# Patient Record
Sex: MALE | Race: WHITE | Employment: OTHER | URBAN - METROPOLITAN AREA
[De-identification: names, ages, dates, MRNs, and addresses within clinical notes are randomized per-mention and may not be internally consistent; named-entity substitution may affect disease eponyms.]

---

## 2017-01-12 ENCOUNTER — OFFICE VISIT (OUTPATIENT)
Dept: OPHTHALMOLOGY | Facility: CLINIC | Age: 67
End: 2017-01-12

## 2017-01-12 ENCOUNTER — NURSE ONLY (OUTPATIENT)
Dept: OPHTHALMOLOGY | Facility: CLINIC | Age: 67
End: 2017-01-12

## 2017-01-12 VITALS — SYSTOLIC BLOOD PRESSURE: 134 MMHG | DIASTOLIC BLOOD PRESSURE: 68 MMHG

## 2017-01-12 DIAGNOSIS — H25.13 AGE-RELATED NUCLEAR CATARACT OF BOTH EYES: ICD-10-CM

## 2017-01-12 DIAGNOSIS — E11.9 TYPE 2 DIABETES MELLITUS WITHOUT RETINOPATHY (HCC): ICD-10-CM

## 2017-01-12 DIAGNOSIS — H40.003 GLAUCOMA SUSPECT OF BOTH EYES: Primary | ICD-10-CM

## 2017-01-12 DIAGNOSIS — IMO0001 UNCONTROLLED TYPE 2 DIABETES MELLITUS WITHOUT COMPLICATION, WITHOUT LONG-TERM CURRENT USE OF INSULIN: Primary | ICD-10-CM

## 2017-01-12 PROCEDURE — 92015 DETERMINE REFRACTIVE STATE: CPT | Performed by: OPHTHALMOLOGY

## 2017-01-12 PROCEDURE — 92250 FUNDUS PHOTOGRAPHY W/I&R: CPT | Performed by: OPHTHALMOLOGY

## 2017-01-12 PROCEDURE — 92004 COMPRE OPH EXAM NEW PT 1/>: CPT | Performed by: OPHTHALMOLOGY

## 2017-01-12 RX ORDER — LISINOPRIL 10 MG/1
TABLET ORAL
COMMUNITY
Start: 2016-12-15 | End: 2017-05-30

## 2017-01-12 NOTE — PROGRESS NOTES
Janell Lakhani is here to have medication reconciliation performed by the clinical pharmacist as part of the Diabetic Annual Assessment. Medication Reconciliation:  Patient is well-aware of his medications, timing, doses, and directions.      Adherence: P

## 2017-01-12 NOTE — ASSESSMENT & PLAN NOTE
Discussed diagnosis of cataracts in detail with patient. Cataract surgery not indicated at this time due to vision level. Will continue to monitor yearly. Patient will call sooner than 1 year recall if they notice a change in vision.   New glasses today

## 2017-01-12 NOTE — PATIENT INSTRUCTIONS
Glaucoma suspect of both eyes  Discussed with patient that he is a glaucoma suspect based on increased cupping of the optic nerves with asymmetry. Retinal photos taken today to document optic nerves. Glaucoma diagnostic testing ordered.   Will not start

## 2017-01-12 NOTE — PROGRESS NOTES
Alea Ayon is a 78year old male.     HPI:     HPI     Diabetic Eye Exam    Additional comments: Pt has been a diabetic for 15+ years  15+ years on pills/  0 years on Insulin   Pt checks his BS 1x a day   Pt's last blood sugar was 100  Last HA1C was 6.6 HCl  MG Oral Tablet 24 Hr Take one tablet by mouth two times per day Disp: 180 tablet Rfl: 3   simvastatin (ZOCOR) 20 MG Oral Tab 1 tablet by mouth at bedtime (Patient taking differently: Take 10 mg by mouth nightly.  ) Disp: 90 tablet Rfl: 3   lisin -3.50 +1.25 070 +2.75   Left -4.00 +1.00 110 +2.75       Type:  Flat top bifocal      Manifest Refraction      Sphere Cylinder Axis Dist Add Near   Right -2.25 +1.00 70 20/30- +2.75 20/30   Left -3.75 +1.00 110 20/20 +2.75 20/20         Final Rx      Spher

## 2017-01-12 NOTE — PROGRESS NOTES
Marek Rendon is a 78year old male.     HPI:       Patient History:  Past Medical History   Diagnosis Date   • History of hepatitis A virus infection 1961     per NG   • Type II or unspecified type diabetes mellitus without mention of complication, not sta Cholecalciferol (D-2000 MAXIMUM STRENGTH) 2000 UNITS Oral Tab Take  by mouth.  take 1 by Oral route  every day Disp:  Rfl:        Allergies:  No Known Allergies    ROS:       PHYSICAL EXAM:      Not recorded           ASSESSMENT/PLAN:     Diagnoses and Pl

## 2017-01-12 NOTE — ASSESSMENT & PLAN NOTE
Discussed with patient that he is a glaucoma suspect based on increased cupping of the optic nerves with asymmetry. Retinal photos taken today to document optic nerves. Glaucoma diagnostic testing ordered.   Will not start medication, but will continue t

## 2017-01-31 ENCOUNTER — OFFICE VISIT (OUTPATIENT)
Dept: OPHTHALMOLOGY | Facility: CLINIC | Age: 67
End: 2017-01-31

## 2017-01-31 DIAGNOSIS — H40.003 GLAUCOMA SUSPECT OF BOTH EYES: ICD-10-CM

## 2017-01-31 PROCEDURE — 92133 CPTRZD OPH DX IMG PST SGM ON: CPT | Performed by: OPHTHALMOLOGY

## 2017-01-31 PROCEDURE — 92083 EXTENDED VISUAL FIELD XM: CPT | Performed by: OPHTHALMOLOGY

## 2017-01-31 NOTE — PROGRESS NOTES
Liban Thacker is a 78year old male.     HPI:     HPI     Patient is here for a VF and OCT with no MD.       Last edited by Timothy Tomlin O.T. on 1/31/2017  8:11 AM.     Patient History:  Past Medical History   Diagnosis Date   • History of hepatitis A v LOW STRENGTH) 81 MG Oral Tab EC Take  by mouth. take 1 tablet (81MG)  by oral route  every day Disp:  Rfl:    Cholecalciferol (D-2000 MAXIMUM STRENGTH) 2000 UNITS Oral Tab Take  by mouth.  take 1 by Oral route  every day Disp:  Rfl:        Allergies:  No Kn

## 2017-04-28 ENCOUNTER — HOSPITAL ENCOUNTER (OUTPATIENT)
Dept: GENERAL RADIOLOGY | Facility: HOSPITAL | Age: 67
Discharge: HOME | End: 2017-04-28
Attending: ORTHOPAEDIC SURGERY | Admitting: ORTHOPAEDIC SURGERY
Payer: MEDICARE

## 2017-04-28 ENCOUNTER — OFFICE VISIT (OUTPATIENT)
Dept: ORTHOPEDICS CLINIC | Facility: CLINIC | Age: 67
End: 2017-04-28

## 2017-04-28 DIAGNOSIS — M25.561 ACUTE PAIN OF RIGHT KNEE: ICD-10-CM

## 2017-04-28 DIAGNOSIS — M70.51 PATELLAR BURSITIS, RIGHT: Primary | ICD-10-CM

## 2017-04-28 PROCEDURE — 73565 X-RAY EXAM OF KNEES: CPT

## 2017-04-28 PROCEDURE — 99203 OFFICE O/P NEW LOW 30 MIN: CPT | Performed by: ORTHOPAEDIC SURGERY

## 2017-04-28 PROCEDURE — G0463 HOSPITAL OUTPT CLINIC VISIT: HCPCS | Performed by: ORTHOPAEDIC SURGERY

## 2017-04-28 PROCEDURE — 73560 X-RAY EXAM OF KNEE 1 OR 2: CPT

## 2017-04-28 RX ORDER — CLINDAMYCIN HYDROCHLORIDE 150 MG/1
150 CAPSULE ORAL 3 TIMES DAILY
Qty: 30 CAPSULE | Refills: 0 | Status: SHIPPED | OUTPATIENT
Start: 2017-04-28 | End: 2017-05-30 | Stop reason: ALTCHOICE

## 2017-04-28 NOTE — PROGRESS NOTES
4/28/2017  Laly Vazquez  9/12/1950  78year old   male  Matthew Peña MD    HPI:   Patient presents with:  Knee Pain: Right - onset about 2 weeks ago after riding his bike like usually - he has pain in the anterior aspect of the knee, has redness on Rfl:        HISTORY:  Past Medical History   Diagnosis Date   • History of hepatitis A virus infection 1961     per NG   • Type II or unspecified type diabetes mellitus without mention of complication, not stated as uncontrolled 2004     per NG   • Unspecif palpation. Patient has intact cruciate and collateral ligament exam.  Patient has soft calf. IMAGING AND TESTING:  Plain films of the right knee reveals moderate medial compartment joint space narrowing.     ASSESSMENT AND PLAN:   Patellar bursitis, rig

## 2017-05-05 ENCOUNTER — OFFICE VISIT (OUTPATIENT)
Dept: ORTHOPEDICS CLINIC | Facility: CLINIC | Age: 67
End: 2017-05-05

## 2017-05-05 DIAGNOSIS — M70.51 PATELLAR BURSITIS, RIGHT: Primary | ICD-10-CM

## 2017-05-05 PROCEDURE — 99213 OFFICE O/P EST LOW 20 MIN: CPT | Performed by: ORTHOPAEDIC SURGERY

## 2017-05-05 PROCEDURE — G0463 HOSPITAL OUTPT CLINIC VISIT: HCPCS | Performed by: ORTHOPAEDIC SURGERY

## 2017-05-05 NOTE — PROGRESS NOTES
5/5/2017  Alea Ayon  9/12/1950  78year old   male  Lyla Pérez MD    HPI:   Patient presents with:  Knee Pain: Right f/u - states he is good, no more pain, no more swelling and no other c/o. He states tthat sometimes his knees crack.     This is

## 2017-05-30 ENCOUNTER — HOSPITAL ENCOUNTER (OUTPATIENT)
Age: 67
Discharge: HOME | End: 2017-05-30
Attending: EMERGENCY MEDICINE
Payer: MEDICARE

## 2017-05-30 VITALS
OXYGEN SATURATION: 100 % | BODY MASS INDEX: 30.48 KG/M2 | WEIGHT: 225 LBS | DIASTOLIC BLOOD PRESSURE: 75 MMHG | RESPIRATION RATE: 19 BRPM | HEIGHT: 72 IN | TEMPERATURE: 98 F | SYSTOLIC BLOOD PRESSURE: 155 MMHG | HEART RATE: 85 BPM

## 2017-05-30 DIAGNOSIS — L03.116 CELLULITIS OF LEFT FOOT: Primary | ICD-10-CM

## 2017-05-30 PROCEDURE — 82962 GLUCOSE BLOOD TEST: CPT

## 2017-05-30 PROCEDURE — 99203 OFFICE O/P NEW LOW 30 MIN: CPT

## 2017-05-30 PROCEDURE — 99213 OFFICE O/P EST LOW 20 MIN: CPT

## 2017-05-30 RX ORDER — SULFAMETHOXAZOLE AND TRIMETHOPRIM 800; 160 MG/1; MG/1
1 TABLET ORAL 2 TIMES DAILY
Qty: 14 TABLET | Refills: 0 | Status: SHIPPED | OUTPATIENT
Start: 2017-05-30 | End: 2017-06-06

## 2017-05-30 RX ORDER — CEPHALEXIN 500 MG/1
500 CAPSULE ORAL 4 TIMES DAILY
Qty: 56 CAPSULE | Refills: 0 | Status: SHIPPED | OUTPATIENT
Start: 2017-05-30 | End: 2017-06-08

## 2017-05-30 RX ORDER — CEPHALEXIN 500 MG/1
500 CAPSULE ORAL 4 TIMES DAILY
Qty: 56 CAPSULE | Refills: 0 | Status: SHIPPED | OUTPATIENT
Start: 2017-05-30 | End: 2017-05-30

## 2017-05-30 RX ORDER — CEPHALEXIN 250 MG/5ML
500 POWDER, FOR SUSPENSION ORAL 4 TIMES DAILY
Qty: 400 ML | Refills: 0 | Status: SHIPPED | OUTPATIENT
Start: 2017-05-30 | End: 2017-05-30

## 2017-05-30 NOTE — ED INITIAL ASSESSMENT (HPI)
About 1 month ago patient was diagnosed with bursitis of right knee and was prescribed clindamycin and finished course; now left foot is reddened, swollen and warm to touch for past 5-6 days; denies fevers

## 2017-05-30 NOTE — ED PROVIDER NOTES
Patient Seen in: [address]    History   Patient presents with:  Lower Extremity Injury (musculoskeletal)    Stated Complaint: left foot red and sore possible infection    The history is provided by the patient.      78 y Cholecalciferol (D-2000 MAXIMUM STRENGTH) 2000 UNITS Oral Tab,  Take  by mouth.  take 1 by Oral route  every day       Family History   Problem Relation Age of Onset   • Hernia complications[other] [OTHER] Father 48     Cause of death; per NG   • Diabetes START taking these medications    Sulfamethoxazole-TMP -160 MG Oral Tab per tablet  Take 1 tablet by mouth 2 (two) times daily. , Print, Disp-14 tablet, R-0

## 2017-06-02 ENCOUNTER — APPOINTMENT (OUTPATIENT)
Dept: LAB | Facility: HOSPITAL | Age: 67
End: 2017-06-02
Attending: INTERNAL MEDICINE
Payer: MEDICARE

## 2017-06-02 DIAGNOSIS — E78.5 HYPERLIPIDEMIA, UNSPECIFIED HYPERLIPIDEMIA TYPE: ICD-10-CM

## 2017-06-02 DIAGNOSIS — I10 ESSENTIAL HYPERTENSION WITH GOAL BLOOD PRESSURE LESS THAN 130/85: ICD-10-CM

## 2017-06-02 DIAGNOSIS — C61 PROSTATE CANCER (HCC): ICD-10-CM

## 2017-06-02 DIAGNOSIS — IMO0001 UNCONTROLLED TYPE 2 DIABETES MELLITUS WITHOUT COMPLICATION, WITHOUT LONG-TERM CURRENT USE OF INSULIN: ICD-10-CM

## 2017-06-02 PROCEDURE — 81003 URINALYSIS AUTO W/O SCOPE: CPT

## 2017-06-02 PROCEDURE — 83036 HEMOGLOBIN GLYCOSYLATED A1C: CPT

## 2017-06-02 PROCEDURE — 82570 ASSAY OF URINE CREATININE: CPT

## 2017-06-02 PROCEDURE — 82043 UR ALBUMIN QUANTITATIVE: CPT

## 2017-06-02 PROCEDURE — 36415 COLL VENOUS BLD VENIPUNCTURE: CPT

## 2017-06-02 PROCEDURE — 80053 COMPREHEN METABOLIC PANEL: CPT

## 2017-06-02 PROCEDURE — 80061 LIPID PANEL: CPT

## 2017-06-02 PROCEDURE — 84443 ASSAY THYROID STIM HORMONE: CPT

## 2017-06-08 ENCOUNTER — APPOINTMENT (OUTPATIENT)
Dept: LAB | Facility: HOSPITAL | Age: 67
End: 2017-06-08
Attending: INTERNAL MEDICINE
Payer: MEDICARE

## 2017-06-08 ENCOUNTER — OFFICE VISIT (OUTPATIENT)
Dept: INTERNAL MEDICINE CLINIC | Facility: CLINIC | Age: 67
End: 2017-06-08

## 2017-06-08 VITALS
HEART RATE: 79 BPM | WEIGHT: 224 LBS | BODY MASS INDEX: 30.34 KG/M2 | RESPIRATION RATE: 20 BRPM | SYSTOLIC BLOOD PRESSURE: 123 MMHG | DIASTOLIC BLOOD PRESSURE: 73 MMHG | OXYGEN SATURATION: 96 % | HEIGHT: 72 IN | TEMPERATURE: 98 F

## 2017-06-08 DIAGNOSIS — I10 ESSENTIAL HYPERTENSION: ICD-10-CM

## 2017-06-08 DIAGNOSIS — E78.5 HYPERLIPIDEMIA, UNSPECIFIED HYPERLIPIDEMIA TYPE: ICD-10-CM

## 2017-06-08 DIAGNOSIS — L03.119 CELLULITIS OF FOOT EXCLUDING TOE: ICD-10-CM

## 2017-06-08 DIAGNOSIS — IMO0001 UNCONTROLLED TYPE 2 DIABETES MELLITUS WITHOUT COMPLICATION, WITHOUT LONG-TERM CURRENT USE OF INSULIN: ICD-10-CM

## 2017-06-08 DIAGNOSIS — L03.119 CELLULITIS OF FOOT EXCLUDING TOE: Primary | ICD-10-CM

## 2017-06-08 DIAGNOSIS — E87.5 HYPERKALEMIA: ICD-10-CM

## 2017-06-08 PROCEDURE — 84550 ASSAY OF BLOOD/URIC ACID: CPT

## 2017-06-08 PROCEDURE — G0463 HOSPITAL OUTPT CLINIC VISIT: HCPCS | Performed by: INTERNAL MEDICINE

## 2017-06-08 PROCEDURE — 36415 COLL VENOUS BLD VENIPUNCTURE: CPT

## 2017-06-08 PROCEDURE — 99214 OFFICE O/P EST MOD 30 MIN: CPT | Performed by: INTERNAL MEDICINE

## 2017-06-08 PROCEDURE — 80048 BASIC METABOLIC PNL TOTAL CA: CPT

## 2017-06-08 RX ORDER — METFORMIN HYDROCHLORIDE 750 MG/1
750 TABLET, EXTENDED RELEASE ORAL 2 TIMES DAILY WITH MEALS
Qty: 180 TABLET | Refills: 1 | Status: SHIPPED | OUTPATIENT
Start: 2017-06-08 | End: 2017-12-06

## 2017-06-08 RX ORDER — LISINOPRIL 5 MG/1
5 TABLET ORAL DAILY
Qty: 90 TABLET | Refills: 1 | Status: CANCELLED | OUTPATIENT
Start: 2017-06-08

## 2017-06-08 RX ORDER — ATORVASTATIN CALCIUM 10 MG/1
TABLET, FILM COATED ORAL
Qty: 90 TABLET | Refills: 1 | Status: SHIPPED | OUTPATIENT
Start: 2017-06-08 | End: 2017-12-06

## 2017-06-08 NOTE — PATIENT INSTRUCTIONS
Problem List Items Addressed This Visit        Unprioritized    Cellulitis of foot excluding toe - Primary     Patient has been treated with antibiotics and the redness, pain and swelling in the lateral aspect of the left foot has resolved.   Given current diabetes mellitus, uncontrolled ([address]     The blood sugars are running higher than usual.  Hemoglobin A1c is higher. Patient has not been exercising as he usually does and not watching his diet as closely as he does eat a.   Advised to increase the metform

## 2017-06-08 NOTE — ASSESSMENT & PLAN NOTE
Blood pressure 123/73, pulse 79, temperature 97.5 °F (36.4 °C), temperature source Oral, resp. rate 20, height 6' (1.829 m), weight 224 lb (101.606 kg), SpO2 96 %.    Blood pressure looks well controlled but the potassium levels are elevated and the renal f

## 2017-06-08 NOTE — ASSESSMENT & PLAN NOTE
Lipid panel and liver function tests look great. Patient has been taking his simvastatin as 1 tablet 3 times a week and half a tablet 4 times a week.   Advised to stop the simvastatin and change to atorvastatin at 10 mg 1 tablet once daily–prescription has

## 2017-06-08 NOTE — ASSESSMENT & PLAN NOTE
Patient has been treated with antibiotics and the redness, pain and swelling in the lateral aspect of the left foot has resolved. Given current worsening kidney functions have advised to stop all further treatment with antibiotics.   Drink plenty of fluids

## 2017-06-08 NOTE — ASSESSMENT & PLAN NOTE
The blood sugars are running higher than usual.  Hemoglobin A1c is higher. Patient has not been exercising as he usually does and not watching his diet as closely as he does eat a.   Advised to increase the metformin to 500 mg 2 tablets in the morning and

## 2017-06-08 NOTE — PROGRESS NOTES
HPI:    Patient ID: Reyes Pablo is a 78year old male. HPI Comments: Entered by Dajuan Arriola MD at 6/8/2017  8:46 AM      Blood sugars are elevated–hemoglobin A1c is at 7.2.    Renal functions show unusual decline–creatinine is stable but EGFR has dr kg)  12/22/15 : 221 lb 4.8 oz (100.381 kg)  06/02/15 : 229 lb 3.2 oz (103.964 kg)  01/15/15 : 232 lb (105.235 kg)   ) An ACE inhibitor/angiotensin II receptor blocker is being taken. He does not see a podiatrist (normal foot exam in office). Eye exam is not fever, inability to bear weight, limited range of motion, stiffness or tingling. His past medical history is significant for diabetes. Review of Systems   Constitutional: Negative. Negative for fever. HENT: Negative. Eyes: Negative.     Respirat Eyes: Conjunctivae and EOM are normal. Pupils are equal, round, and reactive to light. Neck: Normal range of motion. Neck supple. No JVD present. No thyromegaly present.    Cardiovascular: Normal rate, regular rhythm, normal heart sounds and intact dist week  Avoid potassium containing foods for the next week–citrus, bananas and nuts         Relevant Orders    BASIC METABOLIC PANEL (8) (Completed)    Hyperkalemia     Elevated potassium–quite high at this time.   Most likely related to the antibiotics–lorenethe tablet 1      Si tab po qd      MetFORMIN HCl  MG Oral Tablet 24 Hr 180 tablet 1      Sig: Take 1 tablet (750 mg total) by mouth 2 (two) times daily with meals.            Imaging & Referrals:  None       ME#[id number]

## 2017-06-14 ENCOUNTER — APPOINTMENT (OUTPATIENT)
Dept: LAB | Facility: HOSPITAL | Age: 67
End: 2017-06-14
Attending: INTERNAL MEDICINE
Payer: MEDICARE

## 2017-06-14 DIAGNOSIS — E87.5 HYPERKALEMIA: ICD-10-CM

## 2017-06-14 DIAGNOSIS — I10 ESSENTIAL HYPERTENSION: ICD-10-CM

## 2017-06-14 PROCEDURE — 36415 COLL VENOUS BLD VENIPUNCTURE: CPT

## 2017-06-14 PROCEDURE — 80048 BASIC METABOLIC PNL TOTAL CA: CPT

## 2017-06-15 ENCOUNTER — OFFICE VISIT (OUTPATIENT)
Dept: INTERNAL MEDICINE CLINIC | Facility: CLINIC | Age: 67
End: 2017-06-15

## 2017-06-15 VITALS
WEIGHT: 223.38 LBS | TEMPERATURE: 98 F | DIASTOLIC BLOOD PRESSURE: 73 MMHG | BODY MASS INDEX: 30.25 KG/M2 | HEIGHT: 72 IN | HEART RATE: 81 BPM | SYSTOLIC BLOOD PRESSURE: 126 MMHG | RESPIRATION RATE: 20 BRPM | OXYGEN SATURATION: 97 %

## 2017-06-15 DIAGNOSIS — M10.9 GOUT, UNSPECIFIED CAUSE, UNSPECIFIED CHRONICITY, UNSPECIFIED SITE: Primary | ICD-10-CM

## 2017-06-15 DIAGNOSIS — E87.5 HYPERKALEMIA: ICD-10-CM

## 2017-06-15 DIAGNOSIS — I10 ESSENTIAL HYPERTENSION: ICD-10-CM

## 2017-06-15 PROCEDURE — G0463 HOSPITAL OUTPT CLINIC VISIT: HCPCS | Performed by: INTERNAL MEDICINE

## 2017-06-15 PROCEDURE — 99214 OFFICE O/P EST MOD 30 MIN: CPT | Performed by: INTERNAL MEDICINE

## 2017-06-15 RX ORDER — ALLOPURINOL 100 MG/1
100 TABLET ORAL DAILY
Qty: 90 TABLET | Refills: 1 | Status: SHIPPED | OUTPATIENT
Start: 2017-06-15 | End: 2017-12-06

## 2017-06-15 NOTE — ASSESSMENT & PLAN NOTE
Blood pressure 126/73, pulse 81, temperature 98.1 °F (36.7 °C), temperature source Oral, resp. rate 20, height 6' (1.829 m), weight 223 lb 6.4 oz (101.334 kg), SpO2 97 %.    Blood pressure looks stable and well controlled–he has been off his lisinopril due

## 2017-06-15 NOTE — ASSESSMENT & PLAN NOTE
Uric acid levels were at 7.5 and patient has had gouty arthropathy in his foot as well as his knee recently.   He has been advised to start on allopurinol at 100 mg 1 tablet once daily and will recheck uric acid levels along with a kidney function test at h

## 2017-06-15 NOTE — PATIENT INSTRUCTIONS
Problem List Items Addressed This Visit        Unprioritized    Essential hypertension     Blood pressure 126/73, pulse 81, temperature 98.1 °F (36.7 °C), temperature source Oral, resp.  rate 20, height 6' (1.829 m), weight 223 lb 6.4 oz (101.334 kg), SpO2

## 2017-06-15 NOTE — PROGRESS NOTES
HPI:    Patient ID: Lupe Hoskins is a 78year old male. HTN  This is a new problem. The current episode started more than 1 month ago.  Progression since onset: Seen recently with declining kidney functions–EGFR at 47, creatinine at 1.48, potassium at 6 daily with meals. Disp: 180 tablet Rfl: 1   Glucose Blood (VINNIE CONTOUR TEST) In Vitro Strip apply 1 by Misc. (Non-Drug; Combo Route) route 2 times every day Disp: 100 each Rfl: 3   omega-3 fatty acids 1000 MG Oral Cap Take 1,000 mg by mouth daily.  Disp: (101.334 kg), SpO2 97 %. Blood pressure looks stable and well controlled–he has been off his lisinopril due to development of CKD stage III at his last visit with the EGFR at 47.   Repeat labs on the eighth and today shows significant improvement in the k

## 2017-08-29 ENCOUNTER — APPOINTMENT (OUTPATIENT)
Dept: LAB | Facility: HOSPITAL | Age: 67
End: 2017-08-29
Attending: INTERNAL MEDICINE
Payer: MEDICARE

## 2017-08-29 DIAGNOSIS — E87.5 HYPERKALEMIA: ICD-10-CM

## 2017-08-29 DIAGNOSIS — M10.9 GOUT, UNSPECIFIED CAUSE, UNSPECIFIED CHRONICITY, UNSPECIFIED SITE: ICD-10-CM

## 2017-08-29 DIAGNOSIS — I10 ESSENTIAL HYPERTENSION: ICD-10-CM

## 2017-08-29 LAB
ALBUMIN SERPL BCP-MCNC: 3.9 G/DL (ref 3.5–4.8)
ALBUMIN/GLOB SERPL: 1.3 {RATIO} (ref 1–2)
ALP SERPL-CCNC: 50 U/L (ref 32–100)
ALT SERPL-CCNC: 25 U/L (ref 17–63)
ANION GAP SERPL CALC-SCNC: 5 MMOL/L (ref 0–18)
AST SERPL-CCNC: 26 U/L (ref 15–41)
BILIRUB SERPL-MCNC: 0.5 MG/DL (ref 0.3–1.2)
BUN SERPL-MCNC: 10 MG/DL (ref 8–20)
BUN/CREAT SERPL: 10 (ref 10–20)
CALCIUM SERPL-MCNC: 9.4 MG/DL (ref 8.5–10.5)
CHLORIDE SERPL-SCNC: 100 MMOL/L (ref 95–110)
CO2 SERPL-SCNC: 29 MMOL/L (ref 22–32)
CREAT SERPL-MCNC: 1 MG/DL (ref 0.5–1.5)
GLOBULIN PLAS-MCNC: 3.1 G/DL (ref 2.5–3.7)
GLUCOSE SERPL-MCNC: 184 MG/DL (ref 70–99)
OSMOLALITY UR CALC.SUM OF ELEC: 282 MOSM/KG (ref 275–295)
POTASSIUM SERPL-SCNC: 4.8 MMOL/L (ref 3.3–5.1)
PROT SERPL-MCNC: 7 G/DL (ref 5.9–8.4)
SODIUM SERPL-SCNC: 134 MMOL/L (ref 136–144)
URATE SERPL-MCNC: 5.1 MG/DL (ref 3.3–8.7)

## 2017-08-29 PROCEDURE — 80053 COMPREHEN METABOLIC PANEL: CPT

## 2017-08-29 PROCEDURE — 36415 COLL VENOUS BLD VENIPUNCTURE: CPT

## 2017-08-29 PROCEDURE — 84550 ASSAY OF BLOOD/URIC ACID: CPT

## 2017-09-05 ENCOUNTER — PATIENT OUTREACH (OUTPATIENT)
Dept: INTERNAL MEDICINE CLINIC | Facility: CLINIC | Age: 67
End: 2017-09-05

## 2017-09-07 ENCOUNTER — OFFICE VISIT (OUTPATIENT)
Dept: INTERNAL MEDICINE CLINIC | Facility: CLINIC | Age: 67
End: 2017-09-07

## 2017-09-07 VITALS
HEART RATE: 79 BPM | WEIGHT: 219 LBS | HEIGHT: 72 IN | SYSTOLIC BLOOD PRESSURE: 122 MMHG | RESPIRATION RATE: 18 BRPM | DIASTOLIC BLOOD PRESSURE: 70 MMHG | BODY MASS INDEX: 29.66 KG/M2

## 2017-09-07 DIAGNOSIS — E78.5 HYPERLIPIDEMIA, UNSPECIFIED HYPERLIPIDEMIA TYPE: ICD-10-CM

## 2017-09-07 DIAGNOSIS — M10.9 GOUT, UNSPECIFIED CAUSE, UNSPECIFIED CHRONICITY, UNSPECIFIED SITE: ICD-10-CM

## 2017-09-07 DIAGNOSIS — D22.9 ATYPICAL NEVI: Primary | ICD-10-CM

## 2017-09-07 DIAGNOSIS — IMO0001 UNCONTROLLED TYPE 2 DIABETES MELLITUS WITHOUT COMPLICATION, WITHOUT LONG-TERM CURRENT USE OF INSULIN: ICD-10-CM

## 2017-09-07 DIAGNOSIS — I10 ESSENTIAL HYPERTENSION: ICD-10-CM

## 2017-09-07 DIAGNOSIS — R63.4 WEIGHT LOSS: ICD-10-CM

## 2017-09-07 DIAGNOSIS — F41.9 ANXIETY: ICD-10-CM

## 2017-09-07 PROCEDURE — 99214 OFFICE O/P EST MOD 30 MIN: CPT | Performed by: INTERNAL MEDICINE

## 2017-09-07 PROCEDURE — G0463 HOSPITAL OUTPT CLINIC VISIT: HCPCS | Performed by: INTERNAL MEDICINE

## 2017-09-07 PROCEDURE — 99080 SPECIAL REPORTS OR FORMS: CPT | Performed by: INTERNAL MEDICINE

## 2017-09-07 RX ORDER — ESCITALOPRAM OXALATE 5 MG/1
5 TABLET ORAL DAILY
Qty: 30 TABLET | Refills: 5 | Status: SHIPPED | OUTPATIENT
Start: 2017-09-07 | End: 2018-03-12

## 2017-09-07 NOTE — PROGRESS NOTES
HPI:    Patient ID: David Awan is a 78year old male. Labs discussed-EGFR has normalized. Uric acid levels look normal.      Gout   This is a chronic problem. The current episode started more than 1 month ago. The problem occurs constantly.  The prob kg) ) An ACE inhibitor/angiotensin II receptor blocker is being taken. He does not see a podiatrist (normal foot exam in office). Eye exam is not current (due foir the eye exam). Hyperlipidemia   This is a chronic problem.  The current episode started more allopurinol 100 MG Oral Tab Take 1 tablet (100 mg total) by mouth daily.  Disp: 90 tablet Rfl: 1   atorvastatin 10 MG Oral Tab 1 tab po qd Disp: 90 tablet Rfl: 1   MetFORMIN HCl  MG Oral Tablet 24 Hr Take 1 tablet (750 mg total) by mouth 2 (two) amanda content normal. His mood appears anxious. His affect is not blunt, not labile and not inappropriate. His speech is rapid and/or pressured. Cognition and memory are normal. He does not exhibit a depressed mood. Nursing note and vitals reviewed. MICROALB/CREAT RATIO, RANDOM URINE    Weight loss     Wt Readings from Last 6 Encounters:  09/07/17 : 219 lb (99.3 kg)  06/15/17 : 223 lb 6.4 oz (101.3 kg)  06/08/17 : 224 lb (101.6 kg)  05/30/17 : 225 lb (102.1 kg)  10/28/16 : 223 lb (101.2 kg)  12/22/15

## 2017-09-07 NOTE — ASSESSMENT & PLAN NOTE
Blood pressure 122/70, pulse 79, resp. rate 18, height 6' (1.829 m), weight 219 lb (99.3 kg).      Has been off the ace inhibitors and renal functions have normalised,no antihypertensives needed at this time as bp looks well controlled

## 2017-09-07 NOTE — ASSESSMENT & PLAN NOTE
Diabetes seems well controlled, hemoglobin A1c last checked was at 7.1. He has not run any low sugar reactions at this time. Continue on metformin, recheck labs have been ordered.   Diabetic eye exam and foot exam have been completed in January of this ye

## 2017-09-07 NOTE — ASSESSMENT & PLAN NOTE
Lack od socialisation,agitation and irritability -per pt feels fatigued by the end of the day. Trial of lexapro 5 mgs 1 tab with breakfast and reassess needs at the next visit.

## 2017-09-07 NOTE — ASSESSMENT & PLAN NOTE
Wt Readings from Last 6 Encounters:  09/07/17 : 219 lb (99.3 kg)  06/15/17 : 223 lb 6.4 oz (101.3 kg)  06/08/17 : 224 lb (101.6 kg)  05/30/17 : 225 lb (102.1 kg)  10/28/16 : 223 lb (101.2 kg)  12/22/15 : 221 lb 4.8 oz (100.4 kg)     Patient has been on die

## 2017-09-07 NOTE — PATIENT INSTRUCTIONS
Problem List Items Addressed This Visit        Unprioritized    Anxiety     Lack od socialisation,agitation and irritability -per pt feels fatigued by the end of the day. Trial of lexapro 5 mgs 1 tab with breakfast and reassess needs at the next visit. Patient has been on diet control and has been trying to lose weight but will continue to monitor for any persistent weight loss. Advised to ensure that he eats adequate amount of proteins and drinks fluids to keep well-hydrated.   Follow-up in about 3 mo

## 2017-11-01 ENCOUNTER — NURSE TRIAGE (OUTPATIENT)
Dept: OTHER | Age: 67
End: 2017-11-01

## 2017-11-01 ENCOUNTER — HOSPITAL ENCOUNTER (OUTPATIENT)
Age: 67
Discharge: HOME | DRG: 549 | End: 2017-11-01
Attending: EMERGENCY MEDICINE
Payer: MEDICARE

## 2017-11-01 VITALS
DIASTOLIC BLOOD PRESSURE: 76 MMHG | HEART RATE: 91 BPM | SYSTOLIC BLOOD PRESSURE: 135 MMHG | OXYGEN SATURATION: 98 % | TEMPERATURE: 98 F | RESPIRATION RATE: 16 BRPM | BODY MASS INDEX: 29.8 KG/M2 | HEIGHT: 72 IN | WEIGHT: 220 LBS

## 2017-11-01 DIAGNOSIS — L03.116 CELLULITIS OF LEFT KNEE: Primary | ICD-10-CM

## 2017-11-01 PROCEDURE — 87205 SMEAR GRAM STAIN: CPT | Performed by: EMERGENCY MEDICINE

## 2017-11-01 PROCEDURE — 99214 OFFICE O/P EST MOD 30 MIN: CPT

## 2017-11-01 PROCEDURE — 87070 CULTURE OTHR SPECIMN AEROBIC: CPT | Performed by: EMERGENCY MEDICINE

## 2017-11-01 PROCEDURE — 87147 CULTURE TYPE IMMUNOLOGIC: CPT | Performed by: EMERGENCY MEDICINE

## 2017-11-01 PROCEDURE — 87186 SC STD MICRODIL/AGAR DIL: CPT | Performed by: EMERGENCY MEDICINE

## 2017-11-01 RX ORDER — CLINDAMYCIN HYDROCHLORIDE 300 MG/1
300 CAPSULE ORAL 3 TIMES DAILY
Qty: 30 CAPSULE | Refills: 0 | Status: SHIPPED | OUTPATIENT
Start: 2017-11-01 | End: 2017-11-06

## 2017-11-01 RX ORDER — CLINDAMYCIN HYDROCHLORIDE 300 MG/1
300 CAPSULE ORAL 3 TIMES DAILY
Qty: 30 CAPSULE | Refills: 0 | Status: SHIPPED | OUTPATIENT
Start: 2017-11-01 | End: 2017-11-01

## 2017-11-01 RX ORDER — GINSENG 100 MG
CAPSULE ORAL ONCE
Status: COMPLETED | OUTPATIENT
Start: 2017-11-01 | End: 2017-11-01

## 2017-11-01 NOTE — ED INITIAL ASSESSMENT (HPI)
C/o pain and swelling Lt knee with sore for 2 weeks. Wound drainage noted. Claimed that he was kneeling on a concrete floor  2 weeks ago.  Denies fever

## 2017-11-01 NOTE — TELEPHONE ENCOUNTER
Action Requested: Summary for Provider     []  Critical Lab, Recommendations Needed  [] Need Additional Advice  []   FYI    []   Need Orders  [] Need Medications Sent to Pharmacy  []  Other     SUMMARY: pt advised to go to IC as no appt available today.

## 2017-11-01 NOTE — ED PROVIDER NOTES
Patient Seen in: [facility] [address] [place] [address]    History   Patient presents with:  Cellulitis (integumentary, infectious)    Stated Complaint: Lt Knee Pain    HPI    The patient is a 79-year-old male with a past history of type 2 diabetes systems are as noted in HPI. Constitutional and vital signs reviewed. All other systems reviewed and negative except as noted above. PSFH elements reviewed from today and agreed except as otherwise stated in HPI.     Physical Exam   ED Triage Vital his primary MD in the next 3-5 days for wound check.   MDM           Disposition and Plan     Clinical Impression:  Cellulitis of left knee  (primary encounter diagnosis)    Disposition:  Discharge    Follow-up:  Osiris Szymanski MD  [address] [place] [address]

## 2017-11-02 ENCOUNTER — HOSPITAL ENCOUNTER (INPATIENT)
Facility: HOSPITAL | Age: 67
LOS: 4 days | Discharge: HOME | DRG: 549 | End: 2017-11-06
Attending: HOSPITALIST | Admitting: HOSPITALIST
Payer: MEDICARE

## 2017-11-02 ENCOUNTER — APPOINTMENT (OUTPATIENT)
Dept: GENERAL RADIOLOGY | Facility: HOSPITAL | Age: 67
DRG: 549 | End: 2017-11-02
Attending: NURSE PRACTITIONER
Payer: MEDICARE

## 2017-11-02 DIAGNOSIS — IMO0001 UNCONTROLLED TYPE 2 DIABETES MELLITUS WITHOUT COMPLICATION, WITHOUT LONG-TERM CURRENT USE OF INSULIN: ICD-10-CM

## 2017-11-02 DIAGNOSIS — L03.116 CELLULITIS OF LEFT KNEE: Primary | ICD-10-CM

## 2017-11-02 DIAGNOSIS — M70.42 PREPATELLAR BURSITIS OF LEFT KNEE: ICD-10-CM

## 2017-11-02 PROCEDURE — 73560 X-RAY EXAM OF KNEE 1 OR 2: CPT | Performed by: NURSE PRACTITIONER

## 2017-11-02 PROCEDURE — 99223 1ST HOSP IP/OBS HIGH 75: CPT | Performed by: HOSPITALIST

## 2017-11-02 RX ORDER — ENOXAPARIN SODIUM 100 MG/ML
40 INJECTION SUBCUTANEOUS DAILY
Status: DISCONTINUED | OUTPATIENT
Start: 2017-11-02 | End: 2017-11-06

## 2017-11-02 RX ORDER — SODIUM CHLORIDE 0.9 % (FLUSH) 0.9 %
3 SYRINGE (ML) INJECTION AS NEEDED
Status: DISCONTINUED | OUTPATIENT
Start: 2017-11-02 | End: 2017-11-06

## 2017-11-02 RX ORDER — SODIUM PHOSPHATE, DIBASIC AND SODIUM PHOSPHATE, MONOBASIC 7; 19 G/133ML; G/133ML
1 ENEMA RECTAL ONCE AS NEEDED
Status: DISCONTINUED | OUTPATIENT
Start: 2017-11-02 | End: 2017-11-06

## 2017-11-02 RX ORDER — MORPHINE SULFATE 4 MG/ML
4 INJECTION, SOLUTION INTRAMUSCULAR; INTRAVENOUS EVERY 2 HOUR PRN
Status: DISCONTINUED | OUTPATIENT
Start: 2017-11-02 | End: 2017-11-06

## 2017-11-02 RX ORDER — HYDROCODONE BITARTRATE AND ACETAMINOPHEN 5; 325 MG/1; MG/1
2 TABLET ORAL EVERY 4 HOURS PRN
Status: DISCONTINUED | OUTPATIENT
Start: 2017-11-02 | End: 2017-11-06

## 2017-11-02 RX ORDER — ACETAMINOPHEN 325 MG/1
650 TABLET ORAL EVERY 4 HOURS PRN
Status: DISCONTINUED | OUTPATIENT
Start: 2017-11-02 | End: 2017-11-06

## 2017-11-02 RX ORDER — ALLOPURINOL 100 MG/1
100 TABLET ORAL DAILY
Status: DISCONTINUED | OUTPATIENT
Start: 2017-11-03 | End: 2017-11-06

## 2017-11-02 RX ORDER — DOCUSATE SODIUM 100 MG/1
100 CAPSULE, LIQUID FILLED ORAL 2 TIMES DAILY
Status: DISCONTINUED | OUTPATIENT
Start: 2017-11-02 | End: 2017-11-06

## 2017-11-02 RX ORDER — ONDANSETRON 2 MG/ML
4 INJECTION INTRAMUSCULAR; INTRAVENOUS EVERY 6 HOURS PRN
Status: DISCONTINUED | OUTPATIENT
Start: 2017-11-02 | End: 2017-11-06

## 2017-11-02 RX ORDER — HYDROCODONE BITARTRATE AND ACETAMINOPHEN 5; 325 MG/1; MG/1
1 TABLET ORAL EVERY 4 HOURS PRN
Status: DISCONTINUED | OUTPATIENT
Start: 2017-11-02 | End: 2017-11-06

## 2017-11-02 RX ORDER — POLYETHYLENE GLYCOL 3350 17 G/17G
17 POWDER, FOR SOLUTION ORAL DAILY PRN
Status: DISCONTINUED | OUTPATIENT
Start: 2017-11-02 | End: 2017-11-06

## 2017-11-02 RX ORDER — BISACODYL 10 MG
10 SUPPOSITORY, RECTAL RECTAL
Status: DISCONTINUED | OUTPATIENT
Start: 2017-11-02 | End: 2017-11-06

## 2017-11-02 RX ORDER — ACETAMINOPHEN 325 MG/1
650 TABLET ORAL EVERY 6 HOURS PRN
Status: DISCONTINUED | OUTPATIENT
Start: 2017-11-02 | End: 2017-11-06

## 2017-11-02 RX ORDER — ESCITALOPRAM OXALATE 10 MG/1
5 TABLET ORAL DAILY
Status: DISCONTINUED | OUTPATIENT
Start: 2017-11-03 | End: 2017-11-06

## 2017-11-02 RX ORDER — ATORVASTATIN CALCIUM 10 MG/1
10 TABLET, FILM COATED ORAL NIGHTLY
Status: DISCONTINUED | OUTPATIENT
Start: 2017-11-02 | End: 2017-11-06

## 2017-11-02 RX ORDER — DEXTROSE MONOHYDRATE 25 G/50ML
50 INJECTION, SOLUTION INTRAVENOUS AS NEEDED
Status: DISCONTINUED | OUTPATIENT
Start: 2017-11-02 | End: 2017-11-06

## 2017-11-02 RX ORDER — MORPHINE SULFATE 2 MG/ML
1 INJECTION, SOLUTION INTRAMUSCULAR; INTRAVENOUS EVERY 2 HOUR PRN
Status: DISCONTINUED | OUTPATIENT
Start: 2017-11-02 | End: 2017-11-06

## 2017-11-02 RX ORDER — ZOLPIDEM TARTRATE 5 MG/1
5 TABLET ORAL NIGHTLY PRN
Status: DISCONTINUED | OUTPATIENT
Start: 2017-11-02 | End: 2017-11-06

## 2017-11-02 RX ORDER — MORPHINE SULFATE 2 MG/ML
2 INJECTION, SOLUTION INTRAMUSCULAR; INTRAVENOUS EVERY 2 HOUR PRN
Status: DISCONTINUED | OUTPATIENT
Start: 2017-11-02 | End: 2017-11-06

## 2017-11-02 NOTE — H&P
CHIEF COMPLAINT     HISTORY OF PRESENT ILLNESS    Keila Roland is a 75year old male with several days history of left knee redness, of note patient has history of right knee prepatellar bursitis,    Patient states most recently, during heavy rains, he w 0.0 oz/week       Comment: 1 beer/month    Drug use: No            Other Topics            Concern  Caffeine Concern        Yes    Comment:Coffee, 2 cups; per NG        Family History:  Reviewed, not pertinent to present illness    REVIEW OF SYSTEMS tolerated, no therapy required  Follow-up with previously treating surgeon or PCP on discharge

## 2017-11-02 NOTE — TELEPHONE ENCOUNTER
Dr Matthew Boone, would you like to see patient for evaluation today, he is inclined to go to ER  Chart reviewed, patient did go to [facility] 11/1/17 diagnosed with Celulitis, treated with abx, culture done and directed to f/u with PCP in 3 days  Patient reports increas

## 2017-11-02 NOTE — PLAN OF CARE
Diabetes/Glucose Control    • Glucose maintained within prescribed range Progressing        DISCHARGE PLANNING    • Discharge to home or other facility with appropriate resources Progressing        SAFETY ADULT - FALL    • Free from fall injury Progressing

## 2017-11-02 NOTE — TELEPHONE ENCOUNTER
Advised patient on Dr. Solis Call instruction to go to the ER.  Patient verbalized understanding and agreed to go, plans to go to [place].

## 2017-11-02 NOTE — ED PROVIDER NOTES
Patient Seen in: [facility] Emergency Department    History   No chief complaint on file.     Stated Complaint: Cellulitis    HPI    65-year-old male, with history of type 2 diabetes, previous cellulitis and bursitis of the right knee, hypertension Smokeless tobacco: Never Used                      Alcohol use: Yes           0.0 oz/week     Comment: 1 beer/month      Review of Systems    Positive for stated complaint: Cellulitis  Other systems are as noted in HPI.   Constit Result Value    Glucose 249 (*)     Sodium 130 (*)     All other components within normal limits   POCT GLUCOSE - Abnormal; Notable for the following:     POC Glucose  224 (*)     All other components within normal limits   CBC W/ DIFFERENTIAL - Abnormal;

## 2017-11-02 NOTE — ED INITIAL ASSESSMENT (HPI)
Pt c/o left knee pain, swelling and redness. Pt was seen at [address] yesterday and given clindamycin for infection. Pt states he has taken 3 doses and knee is getting worse. Slight fever last night.

## 2017-11-03 PROCEDURE — 99233 SBSQ HOSP IP/OBS HIGH 50: CPT | Performed by: HOSPITALIST

## 2017-11-03 RX ORDER — 0.9 % SODIUM CHLORIDE 0.9 %
VIAL (ML) INJECTION
Status: COMPLETED
Start: 2017-11-03 | End: 2017-11-03

## 2017-11-03 NOTE — CONSULTS
INFECTIOUS DISEASE CONSULT NOTE    Reyes Pablo Patient Status:  Inpatient    1950 MRN [id number]   Location [facility] 4W/SW/SE Attending Avila Matias MD   Hosp Day # 1 PCP Lunette Alpers per NG     Past Surgical History:  No date: BACK SURGERY  10/2007: COLONOSCOPY  Family History   Problem Relation Age of Onset   • Hernia complications [OTHER] Father 48     Cause of death; per NG   • Diabetes Sister      per NG   • Glaucoma Neg    • Macul Glucose-Vitamin C (DEX-4) 4-0.006 g chewable tab 4 tablet, 4 tablet, Oral, Q15 Min PRN  •  Insulin Aspart Pen (NOVOLOG) 100 UNIT/ML flexpen 1-7 Units, 1-7 Units, Subcutaneous, TID CC  •  allopurinol (ZYLOPRIM) tab 100 mg, 100 mg, Oral, Daily  •  atorvastat Surrounding cellulitis and edema extending into foot.   PIV+    Laboratory Data:    Recent Labs   Lab  11/03/17   0507   RBC  4.41*   HGB  13.9   HCT  41.8   MCV  94.8   MCH  31.5   MCHC  33.2   RDW  12.4   WBC  16.7*   PLT  259     Recent Labs   Lab  11/02 progress.     Robson Raza PA-C  [facility]  [phone]

## 2017-11-03 NOTE — PROGRESS NOTES
[facility] dosing service    Initial Pharmacokinetic Consult for Vancomycin Dosing     Austin Mathews is a 75year old male admitted on 11/2 who is being treated for left knee cellulitis and bursitis. Possi .   Pharmacy has been asked to dose Vancomycin by trough level(s) prior to 4th dose. Goal trough level 15-20 ug/mL. 3.  Pharmacy will need BUN/Scr daily while on Vancomycin to assess renal function. 4.  Pharmacy will follow and monitor renal function changes, toxicity and efficacy.     Pharmacy sha

## 2017-11-03 NOTE — PROGRESS NOTES
[facility]    Progress Note    Gay Louis Patient Status:  Inpatient    1950 MRN [id number]   Location [facility] 4W/SW/SE Attending Abebe Todd MD   Hosp Day # 1 PCP Gopal Oliveros MD       SUBJECTIVE:  Feeling bett measures 13.4 x 2.4 cm with lobulated soft tissue component in the midportion. Differential diagnosis would include soft tissue hematoma, extensive cellulitis versus prepatellar bursitis.            Meds:     Current Facility-Administered Medications:  Vanc with meals         Assessment  Patient Active Problem List:     Type II diabetes mellitus, uncontrolled ([address]     Essential hypertension     Hyperlipidemia     Vitamin D deficiency     Routine general medical examination at a health care facility     [employer]typi

## 2017-11-04 PROCEDURE — 99233 SBSQ HOSP IP/OBS HIGH 50: CPT | Performed by: HOSPITALIST

## 2017-11-04 RX ORDER — SODIUM CHLORIDE 9 MG/ML
INJECTION, SOLUTION INTRAVENOUS
Status: COMPLETED
Start: 2017-11-04 | End: 2017-11-04

## 2017-11-04 NOTE — PLAN OF CARE
Diabetes/Glucose Control    • Glucose maintained within prescribed range Progressing        DISCHARGE PLANNING    • Discharge to home or other facility with appropriate resources Progressing        Patient/Family Goals    • Patient/Family Long Term Goal Pr

## 2017-11-04 NOTE — PROGRESS NOTES
INFECTIOUS DISEASE PROGRESS NOTE    Allyn Carpenter Patient Status:  Inpatient    1950 MRN [id number]   Location [facility] 4W/SW/SE Attending Tahir Hernandez MD   [facility] Day # 2 PCP Yulia Doyle MD     Subjective:  Afebrile. Awake and alert. is a 75year old male with a history of HTN, T2DM, gout, R knee bursitis/cellulitis who presents 11/2 with complaints of worsening L knee pain, swelling and redness.  He spent a significant amount of time on his knees feeling out water after a heavy rainfal

## 2017-11-05 PROCEDURE — 99233 SBSQ HOSP IP/OBS HIGH 50: CPT | Performed by: HOSPITALIST

## 2017-11-05 RX ORDER — FUROSEMIDE 20 MG/1
10 TABLET ORAL ONCE
Status: COMPLETED | OUTPATIENT
Start: 2017-11-05 | End: 2017-11-05

## 2017-11-05 NOTE — PROGRESS NOTES
[facility]    Progress Note    Keila Roland Patient Status:  Inpatient    1950 MRN [id number]   Location [facility] 4W/SW/SE Attending Tr Ambrocio MD   Hosp Day # 3 PCP Jovita Raza MD       SUBJECTIVE:    Feeling be lobulated soft tissue component in the midportion. Differential diagnosis would include soft tissue hematoma, extensive cellulitis versus prepatellar bursitis.            Meds:     Current Facility-Administered Medications:  CeFAZolin Sodium (ANCEF) 1 g in Active Problem List:     Type II diabetes mellitus, uncontrolled ([facility] [address]     Essential hypertension     Hyperlipidemia     Vitamin D deficiency     Routine general medical examination at a health care facility     Atypical nevi     Medicare annual wellness v

## 2017-11-05 NOTE — PROGRESS NOTES
[facility]    Progress Note    Austin Mathews Patient Status:  Inpatient    1950 MRN [id number]   Location [facility] 4W/SW/SE Attending Chris Robledo MD   Hosp Day # 2 PCP Noris Pat MD       SUBJECTIVE:  Feeling bett bursitis.            Meds:     Current Facility-Administered Medications:  CeFAZolin Sodium (ANCEF) 1 g in sodium chloride 0.9% 100 mL IVPB 1 g Intravenous Q8H   Normal Saline Flush 0.9 % injection 3 mL 3 mL Intravenous PRN   Enoxaparin Sodium (LOVENOX) 40 Routine general medical examination at a health care facility     Atypical nevi     Medicare annual wellness visit, initial     Hyperkalemia     Glaucoma suspect of both eyes     Type 2 diabetes mellitus without retinopathy ([facility] [address]     Age-related nuclear ca

## 2017-11-06 VITALS
HEART RATE: 71 BPM | OXYGEN SATURATION: 98 % | SYSTOLIC BLOOD PRESSURE: 117 MMHG | HEIGHT: 72 IN | WEIGHT: 219.13 LBS | RESPIRATION RATE: 16 BRPM | BODY MASS INDEX: 29.68 KG/M2 | TEMPERATURE: 98 F | DIASTOLIC BLOOD PRESSURE: 67 MMHG

## 2017-11-06 PROCEDURE — 99239 HOSP IP/OBS DSCHRG MGMT >30: CPT | Performed by: HOSPITALIST

## 2017-11-06 RX ORDER — CEFADROXIL 1000 MG/1
1 TABLET ORAL 2 TIMES DAILY
Qty: 28 TABLET | Refills: 0 | Status: SHIPPED | OUTPATIENT
Start: 2017-11-06 | End: 2017-11-20

## 2017-11-06 NOTE — DISCHARGE SUMMARY
[facility]    Discharge Summary    Christina Chisholm Patient Status:  Inpatient    1950 MRN [id number]   Location [facility] 4W/SW/SE Attending Nithya Navarrete MD   Hosp Day # 4 PCP Dayton Gutierrez MD     Date of Admission:  with improving erythema and edema      History of Present Illness: Per Dr Dennise Lerner    Patient is a 68-year-old  male who was on his knees, fixing something in the basement around 3 weeks ago.   He developed some blisters on his left knee, and since CONTINUE taking these medications      Instructions Prescription details   allopurinol 100 MG Tabs  Commonly known as:  ZYLOPRIM      Take 1 tablet (100 mg total) by mouth daily.    Quantity:  90 tablet  Refills:  1     ASPIRIN ADULT LOW STRENGTH 81 MG Tb

## 2017-11-06 NOTE — PLAN OF CARE
Diabetes/Glucose Control    • Glucose maintained within prescribed range Completed        DISCHARGE PLANNING    • Discharge to home or other facility with appropriate resources Completed        Patient/Family Goals    • Patient/Family Long Term Goal Comple

## 2017-11-06 NOTE — PROGRESS NOTES
INFECTIOUS DISEASE PROGRESS NOTE    Delon Call Patient Status:  Inpatient    1950 MRN [id number]   Location [facility] 4W/SW/SE Attending Dawn Mejia MD   [address] Day # 4 PCP Romulo Farrell MD     Subjective:  Afebrile.  Comfortably resti significant amount of time on his knees feeling out water after a heavy rainfall. After which he developed swelling and erythema for which he applied a heating pad along with bacitracin ointment and a small ulcer developed.  Seen at [facility]

## 2017-11-07 ENCOUNTER — TELEPHONE (OUTPATIENT)
Dept: INTERNAL MEDICINE UNIT | Facility: HOSPITAL | Age: 67
End: 2017-11-07

## 2017-11-07 ENCOUNTER — TELEPHONE (OUTPATIENT)
Dept: MEDSURG UNIT | Facility: HOSPITAL | Age: 67
End: 2017-11-07

## 2017-11-07 ENCOUNTER — PATIENT OUTREACH (OUTPATIENT)
Dept: INTERNAL MEDICINE CLINIC | Facility: CLINIC | Age: 67
End: 2017-11-07

## 2017-11-07 NOTE — PROGRESS NOTES
Initial Post Discharge Follow Up   Discharge Date: 11/6/17  Contact Date: 11/7/2017    Consent Verification:  Assessment Completed With: Patient  HIPAA Verified? Yes    1.  Tell me why you were in the hospital?  \"L leg from the foot to the leg, it was swo issues keep me from taking my medicine    9. Does the doctor who prescribed the medicine know about the side effects you are having? No     10. Have you received your home health care services / DME ordered upon discharge?  No HH / No DME \"I dont need it\" MD if pain or swelling worsen or he develops fevers. Also educated the patient on the importance of completing full course of antibiotics. Pt reminded of TCM f/u appt with PCP on 11/16/17.      Referral/Contact:  RUSSELL           NA     [x]  Discharge Summary,

## 2017-11-16 ENCOUNTER — OFFICE VISIT (OUTPATIENT)
Dept: INTERNAL MEDICINE CLINIC | Facility: CLINIC | Age: 67
End: 2017-11-16

## 2017-11-16 ENCOUNTER — LAB ENCOUNTER (OUTPATIENT)
Dept: LAB | Facility: HOSPITAL | Age: 67
End: 2017-11-16
Attending: INTERNAL MEDICINE
Payer: MEDICARE

## 2017-11-16 VITALS
SYSTOLIC BLOOD PRESSURE: 124 MMHG | HEIGHT: 72 IN | WEIGHT: 218.63 LBS | TEMPERATURE: 98 F | HEART RATE: 74 BPM | DIASTOLIC BLOOD PRESSURE: 70 MMHG | BODY MASS INDEX: 29.61 KG/M2 | RESPIRATION RATE: 18 BRPM

## 2017-11-16 DIAGNOSIS — M70.42 PREPATELLAR BURSITIS OF LEFT KNEE: Primary | ICD-10-CM

## 2017-11-16 DIAGNOSIS — M70.42 PREPATELLAR BURSITIS OF LEFT KNEE: ICD-10-CM

## 2017-11-16 DIAGNOSIS — A41.9 SEPSIS AFFECTING SKIN: ICD-10-CM

## 2017-11-16 PROBLEM — M70.40 PREPATELLAR BURSITIS: Status: ACTIVE | Noted: 2017-11-02

## 2017-11-16 PROCEDURE — 99495 TRANSJ CARE MGMT MOD F2F 14D: CPT | Performed by: INTERNAL MEDICINE

## 2017-11-16 PROCEDURE — 36415 COLL VENOUS BLD VENIPUNCTURE: CPT

## 2017-11-16 PROCEDURE — 85025 COMPLETE CBC W/AUTO DIFF WBC: CPT

## 2017-11-16 NOTE — PATIENT INSTRUCTIONS
Problem List Items Addressed This Visit        Unprioritized    Prepatellar bursitis - Primary     Knee prepatellar bursitis, septic bursitis, treated with vancomycin and then Ancef.   He is currently on cefadroxil for 2 weeks which will be completed on the

## 2017-11-16 NOTE — ASSESSMENT & PLAN NOTE
Knee prepatellar bursitis, septic bursitis, treated with vancomycin and then Ancef. He is currently on cefadroxil for 2 weeks which will be completed on the 20th, he does have an appointment with infectious disease–Dr. Javi Atkins after.   Patient is afebrile

## 2017-11-16 NOTE — PROGRESS NOTES
HPI:    Nohemy Aguilar is a 75year old male here today for hospital follow up.    He was discharged from Inpatient hospital, [facility]  to Home   Admission Date: 11/2/17   Discharge Date: 11/6/17  Hospital Discharge Diagnosis: No Value exists for Present Illness: Per Dr Dc Gibson  Patient is a 50-year-old  male who was on his knees, fixing something in the basement around 3 weeks ago. [facility] developed some blisters on his left knee, and since then, his knee is becoming with more erythema, s tablet (100 mg total) by mouth daily. atorvastatin 10 MG Oral Tab 1 tab po qd   MetFORMIN HCl  MG Oral Tablet 24 Hr Take 1 tablet (750 mg total) by mouth 2 (two) times daily with meals.    omega-3 fatty acids 1000 MG Oral Cap Take 1,000 mg by mouth No LMP for male patient. Estimated body mass index is 29.65 kg/m² as calculated from the following:    Height as of this encounter: 6' (1.829 m). Weight as of this encounter: 218 lb 9.6 oz (99.2 kg).    /70 (BP Location: Right arm, Patient [place] WITH DIFFERENTIAL WITH PLATELET; Future      Problem List Items Addressed This Visit        Unprioritized    Prepatellar bursitis - Primary     Knee prepatellar bursitis, septic bursitis, treated with vancomycin and then Ancef.   He is currently on cefadrox

## 2017-11-20 ENCOUNTER — OFFICE VISIT (OUTPATIENT)
Dept: ORTHOPEDICS CLINIC | Facility: CLINIC | Age: 67
End: 2017-11-20

## 2017-11-20 DIAGNOSIS — M70.52 BURSITIS OF LEFT KNEE, UNSPECIFIED BURSA: Primary | ICD-10-CM

## 2017-11-20 PROCEDURE — 99213 OFFICE O/P EST LOW 20 MIN: CPT | Performed by: ORTHOPAEDIC SURGERY

## 2017-11-20 PROCEDURE — G0463 HOSPITAL OUTPT CLINIC VISIT: HCPCS | Performed by: ORTHOPAEDIC SURGERY

## 2017-11-20 NOTE — H&P
Patient presents for follow-up on his left knee infected prepatellar bursitis. This required hospitalization earlier in the month. He completes his oral antibiotic course today. He denies any fevers or chills.   Pain is minimal.    On examination, he has

## 2017-12-01 ENCOUNTER — LAB ENCOUNTER (OUTPATIENT)
Dept: LAB | Facility: HOSPITAL | Age: 67
End: 2017-12-01
Attending: INTERNAL MEDICINE
Payer: MEDICARE

## 2017-12-01 DIAGNOSIS — IMO0001 UNCONTROLLED TYPE 2 DIABETES MELLITUS WITHOUT COMPLICATION, WITHOUT LONG-TERM CURRENT USE OF INSULIN: ICD-10-CM

## 2017-12-01 PROCEDURE — 36415 COLL VENOUS BLD VENIPUNCTURE: CPT

## 2017-12-01 PROCEDURE — 80061 LIPID PANEL: CPT

## 2017-12-01 PROCEDURE — 82043 UR ALBUMIN QUANTITATIVE: CPT

## 2017-12-01 PROCEDURE — 80053 COMPREHEN METABOLIC PANEL: CPT

## 2017-12-01 PROCEDURE — 84443 ASSAY THYROID STIM HORMONE: CPT

## 2017-12-01 PROCEDURE — 81001 URINALYSIS AUTO W/SCOPE: CPT

## 2017-12-01 PROCEDURE — 82570 ASSAY OF URINE CREATININE: CPT

## 2017-12-01 PROCEDURE — 85025 COMPLETE CBC W/AUTO DIFF WBC: CPT

## 2017-12-06 ENCOUNTER — OFFICE VISIT (OUTPATIENT)
Dept: INTERNAL MEDICINE CLINIC | Facility: CLINIC | Age: 67
End: 2017-12-06

## 2017-12-06 VITALS
RESPIRATION RATE: 20 BRPM | HEART RATE: 73 BPM | SYSTOLIC BLOOD PRESSURE: 121 MMHG | HEIGHT: 72 IN | DIASTOLIC BLOOD PRESSURE: 71 MMHG | WEIGHT: 218 LBS | BODY MASS INDEX: 29.53 KG/M2

## 2017-12-06 DIAGNOSIS — E78.5 HYPERLIPIDEMIA, UNSPECIFIED HYPERLIPIDEMIA TYPE: Primary | ICD-10-CM

## 2017-12-06 DIAGNOSIS — R09.89 BRUIT OF RIGHT CAROTID ARTERY: ICD-10-CM

## 2017-12-06 DIAGNOSIS — I10 ESSENTIAL HYPERTENSION: ICD-10-CM

## 2017-12-06 DIAGNOSIS — IMO0001 UNCONTROLLED TYPE 2 DIABETES MELLITUS WITHOUT COMPLICATION, WITHOUT LONG-TERM CURRENT USE OF INSULIN: ICD-10-CM

## 2017-12-06 DIAGNOSIS — M70.42 PREPATELLAR BURSITIS OF LEFT KNEE: ICD-10-CM

## 2017-12-06 PROCEDURE — 99214 OFFICE O/P EST MOD 30 MIN: CPT | Performed by: INTERNAL MEDICINE

## 2017-12-06 PROCEDURE — G0463 HOSPITAL OUTPT CLINIC VISIT: HCPCS | Performed by: INTERNAL MEDICINE

## 2017-12-06 RX ORDER — ALLOPURINOL 100 MG/1
100 TABLET ORAL DAILY
Qty: 90 TABLET | Refills: 1 | Status: SHIPPED | OUTPATIENT
Start: 2017-12-06 | End: 2018-06-11

## 2017-12-06 RX ORDER — METFORMIN HYDROCHLORIDE 750 MG/1
750 TABLET, EXTENDED RELEASE ORAL 2 TIMES DAILY WITH MEALS
Qty: 180 TABLET | Refills: 1 | Status: SHIPPED | OUTPATIENT
Start: 2017-12-06 | End: 2018-06-11

## 2017-12-06 RX ORDER — ATORVASTATIN CALCIUM 10 MG/1
TABLET, FILM COATED ORAL
Qty: 90 TABLET | Refills: 1 | Status: SHIPPED | OUTPATIENT
Start: 2017-12-06 | End: 2018-07-19

## 2017-12-06 NOTE — ASSESSMENT & PLAN NOTE
Lipid panel and liver function tests look stable on atorvastatin at 10 mg 1 tablet once daily. Continue the same dose of medications.

## 2017-12-06 NOTE — PATIENT INSTRUCTIONS
Problem List Items Addressed This Visit        Unprioritized    Bruit of right carotid artery     Carotid Dopplers have been ordered.          Relevant Orders    US CAROTID DOPPLER BILAT - DIAG IMG ([id number])    Essential hypertension     Blood pressure 12 Dr. Navarro Clock           Relevant Medications    MetFORMIN HCl  MG Oral Tablet 24 Hr    Other Relevant Orders    CARD TREADMILL STRESS, ADULT (CPT=93017)    CBC WITH DIFFERENTIAL WITH PLATELET    COMP METABOLIC PANEL (14)    HEMOGLOBIN A1C

## 2017-12-06 NOTE — TELEPHONE ENCOUNTER
Pharmacist Neisha calling in stating original Rx for test strips (from 11/22) was sent w/o ICD-10. Requesting for Rx to be resent w/dx code. Rx sent.

## 2017-12-06 NOTE — ASSESSMENT & PLAN NOTE
Gradually improving prepatellar bursitis and septic bursitis. He has completed his cefadroxil after inpatient treatment with vancomycin and Ancef. He has remained afebrile. The prepatellar area has some erythema but no induration  any further.   His whit

## 2017-12-06 NOTE — ASSESSMENT & PLAN NOTE
Blood pressure 121/71, pulse 73, resp. rate 20, height 6' (1.829 m), weight 218 lb (98.9 kg). Blood pressures are very well controlled off medications. We continue to monitor at this time. Renal functions and EGFR looks stable.   Recheck labs in about 3-

## 2017-12-06 NOTE — ASSESSMENT & PLAN NOTE
Diabetes seems well controlled–hemoglobin A1c at 6.5. However fasting sugars were a little elevated at 140. Patient has not been watching his diet lately since Thanksgiving as he has been eating some cookies. Advised caution with desserts and snacks.   C

## 2017-12-11 ENCOUNTER — HOSPITAL ENCOUNTER (OUTPATIENT)
Dept: ULTRASOUND IMAGING | Facility: HOSPITAL | Age: 67
Discharge: HOME | End: 2017-12-11
Attending: INTERNAL MEDICINE
Payer: MEDICARE

## 2017-12-11 DIAGNOSIS — R09.89 BRUIT OF RIGHT CAROTID ARTERY: ICD-10-CM

## 2017-12-11 PROCEDURE — 93880 EXTRACRANIAL BILAT STUDY: CPT | Performed by: INTERNAL MEDICINE

## 2017-12-21 ENCOUNTER — HOSPITAL ENCOUNTER (OUTPATIENT)
Dept: CV DIAGNOSTICS | Facility: HOSPITAL | Age: 67
Discharge: HOME | End: 2017-12-21
Attending: INTERNAL MEDICINE
Payer: MEDICARE

## 2017-12-21 DIAGNOSIS — IMO0001 UNCONTROLLED TYPE 2 DIABETES MELLITUS WITHOUT COMPLICATION, WITHOUT LONG-TERM CURRENT USE OF INSULIN: ICD-10-CM

## 2017-12-21 DIAGNOSIS — I10 ESSENTIAL HYPERTENSION: ICD-10-CM

## 2017-12-21 DIAGNOSIS — E78.5 HYPERLIPIDEMIA, UNSPECIFIED HYPERLIPIDEMIA TYPE: ICD-10-CM

## 2017-12-21 PROCEDURE — 93018 CV STRESS TEST I&R ONLY: CPT | Performed by: INTERNAL MEDICINE

## 2017-12-21 PROCEDURE — 93016 CV STRESS TEST SUPVJ ONLY: CPT | Performed by: INTERNAL MEDICINE

## 2017-12-21 PROCEDURE — 93017 CV STRESS TEST TRACING ONLY: CPT | Performed by: INTERNAL MEDICINE

## 2018-03-13 RX ORDER — ESCITALOPRAM OXALATE 5 MG/1
TABLET ORAL
Qty: 30 TABLET | Refills: 5 | Status: SHIPPED | OUTPATIENT
Start: 2018-03-13 | End: 2018-08-29

## 2018-03-15 ENCOUNTER — LAB ENCOUNTER (OUTPATIENT)
Dept: LAB | Facility: HOSPITAL | Age: 68
End: 2018-03-15
Attending: INTERNAL MEDICINE
Payer: MEDICARE

## 2018-03-15 DIAGNOSIS — IMO0001 UNCONTROLLED TYPE 2 DIABETES MELLITUS WITHOUT COMPLICATION, WITHOUT LONG-TERM CURRENT USE OF INSULIN: ICD-10-CM

## 2018-03-15 LAB
ALBUMIN SERPL BCP-MCNC: 3.8 G/DL (ref 3.5–4.8)
ALBUMIN/GLOB SERPL: 1.2 {RATIO} (ref 1–2)
ALP SERPL-CCNC: 53 U/L (ref 32–100)
ALT SERPL-CCNC: 26 U/L (ref 17–63)
ANION GAP SERPL CALC-SCNC: 8 MMOL/L (ref 0–18)
AST SERPL-CCNC: 27 U/L (ref 15–41)
BACTERIA UR QL AUTO: NEGATIVE /HPF
BASOPHILS # BLD: 0 K/UL (ref 0–0.2)
BASOPHILS NFR BLD: 0 %
BILIRUB SERPL-MCNC: 0.7 MG/DL (ref 0.3–1.2)
BILIRUB UR QL: NEGATIVE
BUN SERPL-MCNC: 15 MG/DL (ref 8–20)
BUN/CREAT SERPL: 15.3 (ref 10–20)
CALCIUM SERPL-MCNC: 9.6 MG/DL (ref 8.5–10.5)
CHLORIDE SERPL-SCNC: 100 MMOL/L (ref 95–110)
CHOLEST SERPL-MCNC: 123 MG/DL (ref 110–200)
CLARITY UR: CLEAR
CO2 SERPL-SCNC: 29 MMOL/L (ref 22–32)
COLOR UR: YELLOW
CREAT SERPL-MCNC: 0.98 MG/DL (ref 0.5–1.5)
CREAT UR-MCNC: 84.8 MG/DL
EOSINOPHIL # BLD: 0.3 K/UL (ref 0–0.7)
EOSINOPHIL NFR BLD: 3 %
ERYTHROCYTE [DISTWIDTH] IN BLOOD BY AUTOMATED COUNT: 13.3 % (ref 11–15)
GLOBULIN PLAS-MCNC: 3.2 G/DL (ref 2.5–3.7)
GLUCOSE SERPL-MCNC: 154 MG/DL (ref 70–99)
GLUCOSE UR-MCNC: NEGATIVE MG/DL
HBA1C MFR BLD: 7 % (ref 4–6)
HCT VFR BLD AUTO: 44.7 % (ref 41–52)
HDLC SERPL-MCNC: 40 MG/DL
HGB BLD-MCNC: 15.2 G/DL (ref 13.5–17.5)
KETONES UR-MCNC: NEGATIVE MG/DL
LDLC SERPL CALC-MCNC: 56 MG/DL (ref 0–99)
LEUKOCYTE ESTERASE UR QL STRIP.AUTO: NEGATIVE
LYMPHOCYTES # BLD: 2.1 K/UL (ref 1–4)
LYMPHOCYTES NFR BLD: 27 %
MCH RBC QN AUTO: 31.9 PG (ref 27–32)
MCHC RBC AUTO-ENTMCNC: 34 G/DL (ref 32–37)
MCV RBC AUTO: 93.8 FL (ref 80–100)
MICROALBUMIN UR-MCNC: 0.2 MG/DL (ref 0–1.8)
MICROALBUMIN/CREAT UR: 2.4 MG/G{CREAT} (ref 0–20)
MONOCYTES # BLD: 0.6 K/UL (ref 0–1)
MONOCYTES NFR BLD: 8 %
NEUTROPHILS # BLD AUTO: 4.8 K/UL (ref 1.8–7.7)
NEUTROPHILS NFR BLD: 61 %
NITRITE UR QL STRIP.AUTO: NEGATIVE
NONHDLC SERPL-MCNC: 83 MG/DL
OSMOLALITY UR CALC.SUM OF ELEC: 288 MOSM/KG (ref 275–295)
PATIENT FASTING: YES
PH UR: 5 [PH] (ref 5–8)
PLATELET # BLD AUTO: 229 K/UL (ref 140–400)
PMV BLD AUTO: 6.7 FL (ref 7.4–10.3)
POTASSIUM SERPL-SCNC: 4.8 MMOL/L (ref 3.3–5.1)
PROT SERPL-MCNC: 7 G/DL (ref 5.9–8.4)
PROT UR-MCNC: NEGATIVE MG/DL
RBC # BLD AUTO: 4.76 M/UL (ref 4.5–5.9)
RBC #/AREA URNS AUTO: 1 /HPF
SODIUM SERPL-SCNC: 137 MMOL/L (ref 136–144)
SP GR UR STRIP: 1.01 (ref 1–1.03)
TRIGL SERPL-MCNC: 133 MG/DL (ref 1–149)
TSH SERPL-ACNC: 2.14 UIU/ML (ref 0.45–5.33)
UROBILINOGEN UR STRIP-ACNC: <2
VIT C UR-MCNC: NEGATIVE MG/DL
WBC # BLD AUTO: 7.8 K/UL (ref 4–11)
WBC #/AREA URNS AUTO: 1 /HPF

## 2018-03-15 PROCEDURE — 81001 URINALYSIS AUTO W/SCOPE: CPT

## 2018-03-15 PROCEDURE — 84443 ASSAY THYROID STIM HORMONE: CPT

## 2018-03-15 PROCEDURE — 83036 HEMOGLOBIN GLYCOSYLATED A1C: CPT

## 2018-03-15 PROCEDURE — 82570 ASSAY OF URINE CREATININE: CPT

## 2018-03-15 PROCEDURE — 80061 LIPID PANEL: CPT

## 2018-03-15 PROCEDURE — 82043 UR ALBUMIN QUANTITATIVE: CPT

## 2018-03-15 PROCEDURE — 36415 COLL VENOUS BLD VENIPUNCTURE: CPT

## 2018-03-15 PROCEDURE — 85025 COMPLETE CBC W/AUTO DIFF WBC: CPT

## 2018-03-15 PROCEDURE — 80053 COMPREHEN METABOLIC PANEL: CPT

## 2018-03-28 ENCOUNTER — OFFICE VISIT (OUTPATIENT)
Dept: INTERNAL MEDICINE CLINIC | Facility: CLINIC | Age: 68
End: 2018-03-28

## 2018-03-28 VITALS
HEART RATE: 76 BPM | RESPIRATION RATE: 18 BRPM | DIASTOLIC BLOOD PRESSURE: 78 MMHG | HEIGHT: 72 IN | BODY MASS INDEX: 30.85 KG/M2 | SYSTOLIC BLOOD PRESSURE: 130 MMHG | WEIGHT: 227.81 LBS

## 2018-03-28 DIAGNOSIS — Z12.5 SCREENING FOR PROSTATE CANCER: ICD-10-CM

## 2018-03-28 DIAGNOSIS — D22.9 ATYPICAL NEVI: ICD-10-CM

## 2018-03-28 DIAGNOSIS — Z00.00 MEDICARE ANNUAL WELLNESS VISIT, SUBSEQUENT: Primary | ICD-10-CM

## 2018-03-28 DIAGNOSIS — F32.A MILD DEPRESSION: ICD-10-CM

## 2018-03-28 DIAGNOSIS — IMO0001 UNCONTROLLED TYPE 2 DIABETES MELLITUS WITHOUT COMPLICATION, WITHOUT LONG-TERM CURRENT USE OF INSULIN: ICD-10-CM

## 2018-03-28 DIAGNOSIS — E55.9 VITAMIN D DEFICIENCY: ICD-10-CM

## 2018-03-28 DIAGNOSIS — E78.5 HYPERLIPIDEMIA, UNSPECIFIED HYPERLIPIDEMIA TYPE: ICD-10-CM

## 2018-03-28 DIAGNOSIS — M10.9 GOUT, UNSPECIFIED CAUSE, UNSPECIFIED CHRONICITY, UNSPECIFIED SITE: ICD-10-CM

## 2018-03-28 DIAGNOSIS — I10 ESSENTIAL HYPERTENSION: ICD-10-CM

## 2018-03-28 DIAGNOSIS — Z13.31 DEPRESSION SCREENING: ICD-10-CM

## 2018-03-28 DIAGNOSIS — Z00.00 ENCOUNTER FOR ANNUAL HEALTH EXAMINATION: ICD-10-CM

## 2018-03-28 DIAGNOSIS — H40.003 GLAUCOMA SUSPECT OF BOTH EYES: ICD-10-CM

## 2018-03-28 PROBLEM — F41.9 ANXIETY: Status: RESOLVED | Noted: 2017-09-07 | Resolved: 2018-03-28

## 2018-03-28 PROBLEM — L03.116 CELLULITIS OF LEFT KNEE: Status: RESOLVED | Noted: 2017-11-02 | Resolved: 2018-03-28

## 2018-03-28 PROBLEM — E11.9 TYPE 2 DIABETES MELLITUS WITHOUT RETINOPATHY (HCC): Status: RESOLVED | Noted: 2017-01-12 | Resolved: 2018-03-28

## 2018-03-28 PROBLEM — M70.40 PREPATELLAR BURSITIS: Status: RESOLVED | Noted: 2017-11-02 | Resolved: 2018-03-28

## 2018-03-28 PROBLEM — L03.119 CELLULITIS OF FOOT EXCLUDING TOE: Status: RESOLVED | Noted: 2017-06-08 | Resolved: 2018-03-28

## 2018-03-28 PROCEDURE — G0102 PROSTATE CA SCREENING; DRE: HCPCS | Performed by: INTERNAL MEDICINE

## 2018-03-28 PROCEDURE — G0444 DEPRESSION SCREEN ANNUAL: HCPCS | Performed by: INTERNAL MEDICINE

## 2018-03-28 PROCEDURE — G0439 PPPS, SUBSEQ VISIT: HCPCS | Performed by: INTERNAL MEDICINE

## 2018-03-28 NOTE — ASSESSMENT & PLAN NOTE
Anxiety with depressive symptoms seem stable on Lexapro at 5 mg daily.   He has been able to cope with all his stressors

## 2018-03-28 NOTE — ASSESSMENT & PLAN NOTE
This has been well supplemented, advised to restart on vitamin D at 2000 units daily over-the-counter.

## 2018-03-28 NOTE — ASSESSMENT & PLAN NOTE
Blood pressure 130/78, pulse 76, resp. rate 18, height 6' (1.829 m), weight 227 lb 12.8 oz (103.3 kg). Blood pressures are very well controlled off medications. We continue to monitor at this time. Renal functions and EGFR looks stable.

## 2018-03-28 NOTE — ASSESSMENT & PLAN NOTE
Normal visual fields. He is due for eye exams for diabetic retinopathy screening as well as the glaucoma screening.   He is advised to contact ophthalmology in [phone] for an appointment

## 2018-03-28 NOTE — PATIENT INSTRUCTIONS
Problem List Items Addressed This Visit        Unprioritized    Atypical nevi     Dermatology  evaluation discussed–Dr. Ed Howell, [phone] for an appointment         Essential hypertension     Blood pressure 130/78, pulse 76, resp.  rate 18, height 6' (1.82 uncontrolled ([address]     He has tolerated metformin 750 mg 1 tablet 2 times daily. Hemoglobin A1c slightly elevated from his baseline–he does not exercise and eat as carefully as he does during the summer months at the winter times.   He is planning to resta 72 years or older   • History of gestational diabetes or birth of baby weighing more than 9 pounds     Covered at least every 3 years,           Glucose (mg/dL)   Date Value   03/15/2018 154 (H)   ----------  GLUCOSE (P) (mg/dL)   Date Value   09/08/2015 1 OK to schedule if you are in this risk group, make sure you have a referral   Prostate Cancer Screening      PSA  Annually to 75 then as needed or JENNIFER annually to 75 PSA due on 06/02/2019  No results found for this or any previous visit.    Annually (age 48 http://www. [place]ph.state. [place]/public/books/advin.htm  A link to the too.me. This site has a lot of good information including definitions of the different types of Advance Directives.  It also has the State forms available on it's webs

## 2018-03-28 NOTE — ASSESSMENT & PLAN NOTE
He has tolerated metformin 750 mg 1 tablet 2 times daily. Hemoglobin A1c slightly elevated from his baseline–he does not exercise and eat as carefully as he does during the summer months at the winter times.   He is planning to restart diet and exercise re

## 2018-03-28 NOTE — PROGRESS NOTES
HPI:   Laurel Dukes is a 75year old male who presents for a Medicare Subsequent Annual Wellness visit (Pt already had Initial Annual Wellness).     His last annual assessment has been over 1 year: Annual Physical due on 06/02/2016         Fall/Risk Asse Glaucoma suspect of both eyes     Age-related nuclear cataract of both eyes     Gout     Weight loss     Bruit of right carotid artery     Mild depression (HCC)    Wt Readings from Last 3 Encounters:  03/28/18 : 227 lb 12.8 oz (103.3 kg)  12/06/17 : 218 lb (meibomian gland dysfunction); Other and unspecified hyperlipidemia; Type II or unspecified type diabetes mellitus without mention of complication, not stated as uncontrolled (2004); Unspecified essential hypertension; and Vitreous floaters of right eye. missing the telephone ring or doorbell:  No I have trouble hearing conversations in a noisy background such as a crowded room or restaurant:  No   I get confused about where sounds come from:  Sometimes I misunderstand some words in a sentence and need to is no hepatosplenomegaly. There is no tenderness. There is no rebound and no guarding. No hernia. Genitourinary: Rectum normal, prostate normal and penis normal. Rectal exam shows guaiac negative stool. Musculoskeletal: Normal range of motion.    Milbert Nasra Hyperlipidemia     Lipid panel and liver function tests look stable on atorvastatin at 10 mg 1 tablet once daily. Continue the same dose of medications. Medicare annual wellness visit, subsequent - Primary     Normal exam.  Labs as ordered.   Skin HORMONE    URINALYSIS, ROUTINE    MICROALB/CREAT RATIO, RANDOM URINE    Vitamin D deficiency     This has been well supplemented, advised to restart on vitamin D at 2000 units daily over-the-counter.            Other Visit Diagnoses     Encounter for annual well-being?: (P) Social Interaction    This section provided for quick review of chart, separate sheet to patient  [address] Internal Lab or Procedure External Lab or Procedure   Diabetes Screening      HbgA1C   Annual concentrates   Clients of institutions for the mentally retarded   Persons who live in the same house as a HepB virus carrier   Homosexual men   Illicit injectable drug abusers     Tetanus Toxoid  Only covered with a cut with metal- TD and TDaP Not covered available to patient in AVS  16+ minutes was spent with all Advanced Care Planning elements today (up to 30 minutes for CPT 77197)

## 2018-03-28 NOTE — ASSESSMENT & PLAN NOTE
Normal exam.  Labs as ordered. Skin check–normal but does have multiple scattered nevi–has not had a skin check done so far. Recommend follow-up with dermatology–Dr. Anita Lara, [phone] for an appointment. No cervical, axillary, inguinal lymphadenopathy.

## 2018-04-08 ENCOUNTER — HOSPITAL ENCOUNTER (OUTPATIENT)
Age: 68
Discharge: HOME | End: 2018-04-08
Attending: FAMILY MEDICINE
Payer: MEDICARE

## 2018-04-08 VITALS
HEART RATE: 83 BPM | HEIGHT: 72 IN | OXYGEN SATURATION: 98 % | DIASTOLIC BLOOD PRESSURE: 76 MMHG | SYSTOLIC BLOOD PRESSURE: 154 MMHG | WEIGHT: 225 LBS | RESPIRATION RATE: 20 BRPM | TEMPERATURE: 99 F | BODY MASS INDEX: 30.48 KG/M2

## 2018-04-08 DIAGNOSIS — S61.211A LACERATION OF LEFT INDEX FINGER WITHOUT FOREIGN BODY WITHOUT DAMAGE TO NAIL, INITIAL ENCOUNTER: Primary | ICD-10-CM

## 2018-04-08 PROCEDURE — 12001 RPR S/N/AX/GEN/TRNK 2.5CM/<: CPT

## 2018-04-08 PROCEDURE — 99213 OFFICE O/P EST LOW 20 MIN: CPT

## 2018-04-08 RX ORDER — GINSENG 100 MG
CAPSULE ORAL ONCE
Status: COMPLETED | OUTPATIENT
Start: 2018-04-08 | End: 2018-04-08

## 2018-04-08 RX ORDER — AMOXICILLIN AND CLAVULANATE POTASSIUM 875; 125 MG/1; MG/1
1 TABLET, FILM COATED ORAL 2 TIMES DAILY
Qty: 14 TABLET | Refills: 0 | Status: SHIPPED | OUTPATIENT
Start: 2018-04-08 | End: 2018-04-15

## 2018-04-08 NOTE — ED INITIAL ASSESSMENT (HPI)
Patient states that he fell after tripping on uneven sidewalk x 45 minutes ago. Laceration noted to left second finger, left side of face abrasion noted. Patient denies head injury, denies LOC, denies lightheadedness or dizziness prior to fall.   Last Td

## 2018-04-08 NOTE — ED NOTES
Dressing to left second finger applied by MA. Patient tolerated well. RN discussed what type of signs of infection to look out for, patient verbalized understanding.

## 2018-04-08 NOTE — ED PROVIDER NOTES
Patient Seen in: [address]    History   Patient presents with:  Laceration Abrasion (integumentary)    Stated Complaint: finger laceration    HPI    Patient here after a mechanical fall over sidewalk and breaking fall w Oral  SpO2: 98 %  O2 Device: None (Room air)    Current:/76   Pulse 83   Temp 98.5 °F (36.9 °C) (Oral)   Resp 20   Ht 182.9 cm (6')   Wt 102.1 kg   SpO2 98%   BMI 30.52 kg/m²         Physical Exam   Constitutional: He is oriented to person, place, an index finger ventral aspect proximal IP joint area was anesthetized in the usual fashion. The wound was scrubbed, draped and explored. There were no deep structures involved. No tendon injury was identified.   The wound was repaired with 5-0 ethilon P1 .

## 2018-04-09 ENCOUNTER — HOSPITAL ENCOUNTER (OUTPATIENT)
Dept: GENERAL RADIOLOGY | Facility: HOSPITAL | Age: 68
Discharge: HOME | End: 2018-04-09
Attending: PHYSICIAN ASSISTANT
Payer: MEDICARE

## 2018-04-09 ENCOUNTER — OFFICE VISIT (OUTPATIENT)
Dept: INTERNAL MEDICINE CLINIC | Facility: CLINIC | Age: 68
End: 2018-04-09

## 2018-04-09 ENCOUNTER — NURSE TRIAGE (OUTPATIENT)
Dept: OTHER | Age: 68
End: 2018-04-09

## 2018-04-09 VITALS
HEIGHT: 73 IN | RESPIRATION RATE: 20 BRPM | WEIGHT: 231.31 LBS | BODY MASS INDEX: 30.66 KG/M2 | TEMPERATURE: 98 F | HEART RATE: 70 BPM | DIASTOLIC BLOOD PRESSURE: 75 MMHG | SYSTOLIC BLOOD PRESSURE: 117 MMHG

## 2018-04-09 DIAGNOSIS — S69.92XD HAND INJURY, LEFT, SUBSEQUENT ENCOUNTER: Primary | ICD-10-CM

## 2018-04-09 DIAGNOSIS — S69.92XD HAND INJURY, LEFT, SUBSEQUENT ENCOUNTER: ICD-10-CM

## 2018-04-09 PROCEDURE — 99213 OFFICE O/P EST LOW 20 MIN: CPT | Performed by: PHYSICIAN ASSISTANT

## 2018-04-09 PROCEDURE — 73130 X-RAY EXAM OF HAND: CPT | Performed by: PHYSICIAN ASSISTANT

## 2018-04-09 NOTE — TELEPHONE ENCOUNTER
Action Requested: Summary for Provider     []  Critical Lab, Recommendations Needed  [] Need Additional Advice  []   FYI    []   Need Orders  [] Need Medications Sent to Pharmacy  []  Other     SUMMARY:   Appointment made for today 4/9/18 at 11:30 am    Pa

## 2018-04-09 NOTE — PROGRESS NOTES
HPI:    Patient ID: Laly Vazquez is a 75year old male. HPI   Patient presents today for follow-up from urgent care. Was seen yesterday after sustaining a mechanical fall and landing on his left hand.   He suffered an approximately 2 cm laceration of t aspirin (ASPIRIN ADULT LOW STRENGTH) 81 MG Oral Tab EC Take  by mouth. take 1 tablet (81MG)  by oral route  every day Disp:  Rfl:    Cholecalciferol (D-2000 MAXIMUM STRENGTH) 2000 UNITS Oral Tab Take  by mouth.  take 1 by Oral route  every day Disp:  Rfl: requested or ordered in this encounter    Imaging & Referrals:  None         ID#[id number]

## 2018-04-10 ENCOUNTER — TELEPHONE (OUTPATIENT)
Dept: OPHTHALMOLOGY | Facility: CLINIC | Age: 68
End: 2018-04-10

## 2018-04-10 NOTE — TELEPHONE ENCOUNTER
[facility] called. Patient has a script for glasses that  2018 from [facility]. [facility] is verifying if they can RX new glasses. Please advise. Thank you.

## 2018-04-25 ENCOUNTER — OFFICE VISIT (OUTPATIENT)
Dept: INTERNAL MEDICINE CLINIC | Facility: CLINIC | Age: 68
End: 2018-04-25

## 2018-04-25 VITALS
BODY MASS INDEX: 31.29 KG/M2 | DIASTOLIC BLOOD PRESSURE: 68 MMHG | WEIGHT: 231 LBS | HEART RATE: 76 BPM | HEIGHT: 72 IN | SYSTOLIC BLOOD PRESSURE: 122 MMHG

## 2018-04-25 DIAGNOSIS — Z48.02 VISIT FOR SUTURE REMOVAL: Primary | ICD-10-CM

## 2018-04-25 PROCEDURE — 99212 OFFICE O/P EST SF 10 MIN: CPT | Performed by: PHYSICIAN ASSISTANT

## 2018-04-25 NOTE — PROGRESS NOTES
HPI:    Patient ID: David Awna is a 75year old male. HPI   Patient presents today for follow-up of left hand injury. Was seen in the office on 4/9/18 after having 5 single interrupted sutures placed on the left index finger at .   At that time was Allergies:No Known Allergies   PHYSICAL EXAM:   Physical Exam   Nursing note and vitals reviewed. Constitutional: He appears well-developed and well-nourished. Cardiovascular: Normal rate, regular rhythm and normal heart sounds.     Pulmonary/Chest:

## 2018-06-12 RX ORDER — ALLOPURINOL 100 MG/1
100 TABLET ORAL DAILY
Qty: 90 TABLET | Refills: 1 | Status: SHIPPED
Start: 2018-06-12 | End: 2018-12-04

## 2018-06-12 RX ORDER — METFORMIN HYDROCHLORIDE 750 MG/1
750 TABLET, EXTENDED RELEASE ORAL 2 TIMES DAILY WITH MEALS
Qty: 180 TABLET | Refills: 1 | Status: SHIPPED
Start: 2018-06-12 | End: 2018-12-04

## 2018-06-12 NOTE — TELEPHONE ENCOUNTER
From: Bridget Lee  Sent: 6/11/2018 10:37 AM CDT  Subject: Medication Renewal Request    Bridget Lee would like a refill of the following medications:     MetFORMIN HCl  MG Oral Tablet 24 Hr Rudolph Jameson MD]     allopurinol 100 MG Oral Tab [Anit

## 2018-07-12 ENCOUNTER — OFFICE VISIT (OUTPATIENT)
Dept: OPHTHALMOLOGY | Facility: CLINIC | Age: 68
End: 2018-07-12

## 2018-07-12 DIAGNOSIS — H25.13 AGE-RELATED NUCLEAR CATARACT OF BOTH EYES: Primary | ICD-10-CM

## 2018-07-12 DIAGNOSIS — H43.393 FLOATERS IN VISUAL FIELD, BILATERAL: ICD-10-CM

## 2018-07-12 DIAGNOSIS — E11.9 DIABETES MELLITUS TYPE 2 WITHOUT RETINOPATHY (HCC): ICD-10-CM

## 2018-07-12 DIAGNOSIS — H40.003 GLAUCOMA SUSPECT OF BOTH EYES: ICD-10-CM

## 2018-07-12 PROCEDURE — 92014 COMPRE OPH EXAM EST PT 1/>: CPT | Performed by: OPHTHALMOLOGY

## 2018-07-12 PROCEDURE — 92015 DETERMINE REFRACTIVE STATE: CPT | Performed by: OPHTHALMOLOGY

## 2018-07-12 PROCEDURE — 92250 FUNDUS PHOTOGRAPHY W/I&R: CPT | Performed by: OPHTHALMOLOGY

## 2018-07-12 NOTE — ASSESSMENT & PLAN NOTE
Discussed with patient that he is a glaucoma suspect based on increased cupping of the optic nerves with asymmetry. Retinal photos taken today to document optic nerves. Will not start medication, but will continue to observe.   Patient verbalized Diane

## 2018-07-12 NOTE — PROGRESS NOTES
Lisseth Coronel is a 75year old male.     HPI:     HPI     Diabetic Eye Exam    Additional comments: Pt has been a diabetic for 15+ years  15+ years on pills/  0 years on Insulin   Pt checks his BS 2-3x a week   Pt's last blood sugar was 110  Last HA1C was 7 tablet Rfl: 1   Glucosamine-Chondroitin (GLUCOSAMINE CHONDR COMPLEX OR) Take by mouth.  Disp:  Rfl:    ESCITALOPRAM 5 MG Oral Tab TAKE ONE TABLET BY MOUTH ONCE DAILY Disp: 30 tablet Rfl: 5   atorvastatin 10 MG Oral Tab 1 tab po qd Disp: 90 tablet Rfl: 1   G Sphere Cylinder Axis Add    Right -3.50 +1.25 070 +2.75    Left -4.00 +1.00 110 +2.75    Type:  Flat top bifocal          Manifest Refraction       Sphere Cylinder [place] Add [place]    Right -3.00 +1.00 070 20/60 +3.00 20/30    Left -4.00 +1.00

## 2018-07-19 RX ORDER — ATORVASTATIN CALCIUM 10 MG/1
TABLET, FILM COATED ORAL
Qty: 90 TABLET | Refills: 1
Start: 2018-07-19

## 2018-07-20 RX ORDER — ATORVASTATIN CALCIUM 10 MG/1
TABLET, FILM COATED ORAL
Qty: 90 TABLET | Refills: 1 | Status: SHIPPED | OUTPATIENT
Start: 2018-07-20 | End: 2018-12-04

## 2018-08-02 ENCOUNTER — NURSE ONLY (OUTPATIENT)
Dept: OPHTHALMOLOGY | Facility: CLINIC | Age: 68
End: 2018-08-02
Payer: MEDICARE

## 2018-08-02 DIAGNOSIS — H40.003 GLAUCOMA SUSPECT OF BOTH EYES: ICD-10-CM

## 2018-08-02 PROCEDURE — 92133 CPTRZD OPH DX IMG PST SGM ON: CPT | Performed by: OPHTHALMOLOGY

## 2018-08-02 PROCEDURE — 92083 EXTENDED VISUAL FIELD XM: CPT | Performed by: OPHTHALMOLOGY

## 2018-08-02 NOTE — PROGRESS NOTES
Liana Valverde is a 75year old male.     HPI:     HPI     Patient is here for a VF and OCT with no MD.      Last edited by Delvin Abrams OT on 8/2/2018 11:23 AM. (History)        Patient History:  Past Medical History:   Diagnosis Date   • Amblyopia (VINNIE CONTOUR TEST) In Vitro Strip USE ONE STRIP TO CHECK GLUCOSE TWICE DAILY Disp: 100 strip Rfl: 3   omega-3 fatty acids 1000 MG Oral Cap Take 1,000 mg by mouth daily. Disp:  Rfl:    aspirin (ASPIRIN ADULT LOW STRENGTH) 81 MG Oral Tab EC Take  by mouth.

## 2018-08-23 ENCOUNTER — LAB ENCOUNTER (OUTPATIENT)
Dept: LAB | Facility: HOSPITAL | Age: 68
End: 2018-08-23
Attending: INTERNAL MEDICINE
Payer: MEDICARE

## 2018-08-23 DIAGNOSIS — Z12.5 SCREENING FOR PROSTATE CANCER: ICD-10-CM

## 2018-08-23 DIAGNOSIS — IMO0001 UNCONTROLLED TYPE 2 DIABETES MELLITUS WITHOUT COMPLICATION, WITHOUT LONG-TERM CURRENT USE OF INSULIN: ICD-10-CM

## 2018-08-23 LAB
ALBUMIN SERPL BCP-MCNC: 3.7 G/DL (ref 3.5–4.8)
ALBUMIN/GLOB SERPL: 1 {RATIO} (ref 1–2)
ALP SERPL-CCNC: 59 U/L (ref 32–100)
ALT SERPL-CCNC: 29 U/L (ref 17–63)
ANION GAP SERPL CALC-SCNC: 8 MMOL/L (ref 0–18)
AST SERPL-CCNC: 27 U/L (ref 15–41)
BASOPHILS # BLD: 0 K/UL (ref 0–0.2)
BASOPHILS NFR BLD: 0 %
BILIRUB SERPL-MCNC: 0.7 MG/DL (ref 0.3–1.2)
BILIRUB UR QL: NEGATIVE
BUN SERPL-MCNC: 18 MG/DL (ref 8–20)
BUN/CREAT SERPL: 17.6 (ref 10–20)
CALCIUM SERPL-MCNC: 9.2 MG/DL (ref 8.5–10.5)
CHLORIDE SERPL-SCNC: 104 MMOL/L (ref 95–110)
CHOLEST SERPL-MCNC: 121 MG/DL (ref 110–200)
CLARITY UR: CLEAR
CO2 SERPL-SCNC: 25 MMOL/L (ref 22–32)
COLOR UR: YELLOW
CREAT SERPL-MCNC: 1.02 MG/DL (ref 0.5–1.5)
CREAT UR-MCNC: 121 MG/DL
EOSINOPHIL # BLD: 0.2 K/UL (ref 0–0.7)
EOSINOPHIL NFR BLD: 3 %
ERYTHROCYTE [DISTWIDTH] IN BLOOD BY AUTOMATED COUNT: 13 % (ref 11–15)
GLOBULIN PLAS-MCNC: 3.6 G/DL (ref 2.5–3.7)
GLUCOSE SERPL-MCNC: 180 MG/DL (ref 70–99)
GLUCOSE UR-MCNC: NEGATIVE MG/DL
HBA1C MFR BLD: 7.9 % (ref 4–6)
HCT VFR BLD AUTO: 45.2 % (ref 41–52)
HDLC SERPL-MCNC: 33 MG/DL
HGB BLD-MCNC: 15.4 G/DL (ref 13.5–17.5)
HGB UR QL STRIP.AUTO: NEGATIVE
KETONES UR-MCNC: NEGATIVE MG/DL
LDLC SERPL CALC-MCNC: 62 MG/DL (ref 0–99)
LEUKOCYTE ESTERASE UR QL STRIP.AUTO: NEGATIVE
LYMPHOCYTES # BLD: 2.6 K/UL (ref 1–4)
LYMPHOCYTES NFR BLD: 31 %
MCH RBC QN AUTO: 31.9 PG (ref 27–32)
MCHC RBC AUTO-ENTMCNC: 34.1 G/DL (ref 32–37)
MCV RBC AUTO: 93.6 FL (ref 80–100)
MICROALBUMIN UR-MCNC: 0.2 MG/DL (ref 0–1.8)
MICROALBUMIN/CREAT UR: 1.7 MG/G{CREAT} (ref 0–20)
MONOCYTES # BLD: 0.7 K/UL (ref 0–1)
MONOCYTES NFR BLD: 8 %
NEUTROPHILS # BLD AUTO: 5 K/UL (ref 1.8–7.7)
NEUTROPHILS NFR BLD: 58 %
NITRITE UR QL STRIP.AUTO: NEGATIVE
NONHDLC SERPL-MCNC: 88 MG/DL
OSMOLALITY UR CALC.SUM OF ELEC: 290 MOSM/KG (ref 275–295)
PATIENT FASTING: YES
PH UR: 5 [PH] (ref 5–8)
PLATELET # BLD AUTO: 268 K/UL (ref 140–400)
PMV BLD AUTO: 7.3 FL (ref 7.4–10.3)
POTASSIUM SERPL-SCNC: 4.3 MMOL/L (ref 3.3–5.1)
PROT SERPL-MCNC: 7.3 G/DL (ref 5.9–8.4)
PROT UR-MCNC: NEGATIVE MG/DL
PSA SERPL-MCNC: 2 NG/ML (ref 0–4)
RBC # BLD AUTO: 4.83 M/UL (ref 4.5–5.9)
SODIUM SERPL-SCNC: 137 MMOL/L (ref 136–144)
SP GR UR STRIP: 1.02 (ref 1–1.03)
TRIGL SERPL-MCNC: 130 MG/DL (ref 1–149)
TSH SERPL-ACNC: 2.06 UIU/ML (ref 0.45–5.33)
UROBILINOGEN UR STRIP-ACNC: <2
VIT C UR-MCNC: NEGATIVE MG/DL
WBC # BLD AUTO: 8.6 K/UL (ref 4–11)

## 2018-08-23 PROCEDURE — 84443 ASSAY THYROID STIM HORMONE: CPT

## 2018-08-23 PROCEDURE — 80061 LIPID PANEL: CPT

## 2018-08-23 PROCEDURE — 36415 COLL VENOUS BLD VENIPUNCTURE: CPT

## 2018-08-23 PROCEDURE — 83036 HEMOGLOBIN GLYCOSYLATED A1C: CPT

## 2018-08-23 PROCEDURE — 80053 COMPREHEN METABOLIC PANEL: CPT

## 2018-08-23 PROCEDURE — 81003 URINALYSIS AUTO W/O SCOPE: CPT

## 2018-08-23 PROCEDURE — 85025 COMPLETE CBC W/AUTO DIFF WBC: CPT

## 2018-08-23 PROCEDURE — 82570 ASSAY OF URINE CREATININE: CPT

## 2018-08-23 PROCEDURE — 82043 UR ALBUMIN QUANTITATIVE: CPT

## 2018-08-29 ENCOUNTER — OFFICE VISIT (OUTPATIENT)
Dept: INTERNAL MEDICINE CLINIC | Facility: CLINIC | Age: 68
End: 2018-08-29
Payer: MEDICARE

## 2018-08-29 VITALS
TEMPERATURE: 98 F | RESPIRATION RATE: 22 BRPM | SYSTOLIC BLOOD PRESSURE: 130 MMHG | DIASTOLIC BLOOD PRESSURE: 70 MMHG | OXYGEN SATURATION: 95 % | BODY MASS INDEX: 32.12 KG/M2 | HEART RATE: 82 BPM | WEIGHT: 237.13 LBS | HEIGHT: 72 IN

## 2018-08-29 DIAGNOSIS — F32.A MILD DEPRESSION: ICD-10-CM

## 2018-08-29 DIAGNOSIS — I10 ESSENTIAL HYPERTENSION: ICD-10-CM

## 2018-08-29 DIAGNOSIS — E78.5 HYPERLIPIDEMIA, UNSPECIFIED HYPERLIPIDEMIA TYPE: Primary | ICD-10-CM

## 2018-08-29 DIAGNOSIS — IMO0001 UNCONTROLLED TYPE 2 DIABETES MELLITUS WITHOUT COMPLICATION, WITHOUT LONG-TERM CURRENT USE OF INSULIN: ICD-10-CM

## 2018-08-29 PROCEDURE — 99214 OFFICE O/P EST MOD 30 MIN: CPT | Performed by: INTERNAL MEDICINE

## 2018-08-29 PROCEDURE — G0463 HOSPITAL OUTPT CLINIC VISIT: HCPCS | Performed by: INTERNAL MEDICINE

## 2018-08-29 RX ORDER — GLIMEPIRIDE 1 MG/1
TABLET ORAL
Qty: 30 TABLET | Refills: 3 | Status: SHIPPED | OUTPATIENT
Start: 2018-08-29 | End: 2019-04-17

## 2018-08-29 NOTE — ASSESSMENT & PLAN NOTE
Depressive disorder–patient seems better, he discontinued the Lexapro as caused some drowsiness. Continue to monitor.

## 2018-08-29 NOTE — PROGRESS NOTES
HPI:    Patient ID: Lyudmila Arnold is a 75year old male. The blood counts look normal.no anemia.   The sugars ,calcium and electrolytes are normal.  The kidney and liver functions look normal.  The thyroid functions look normal.  The urine tests are norm disease include diabetes mellitus, dyslipidemia and sedentary lifestyle. The current treatment provides significant improvement. There are no compliance problems. There is no history of kidney disease, heart failure or PVD.    Hyperlipidemia   This is a ch Blood (VINNIE CONTOUR TEST) In Vitro Strip USE ONE STRIP TO CHECK GLUCOSE TWICE DAILY Disp: 100 strip Rfl: 3   omega-3 fatty acids 1000 MG Oral Cap Take 1,000 mg by mouth daily.  Disp:  Rfl:    aspirin (ASPIRIN ADULT LOW STRENGTH) 81 MG Oral Tab EC Take  by breakfast daily. Advised to check the blood sugars either before breakfast or before dinner on alternate days. Advised to keep the blood sugars in writing and see me in about 6 weeks for further adjustment of medications if needed.   Eye exam has been com

## 2018-08-29 NOTE — ASSESSMENT & PLAN NOTE
Lipid panel and liver function tests look stable and atorvastatin at 10 mg daily. Continue the same dose of medications.

## 2018-08-29 NOTE — ASSESSMENT & PLAN NOTE
Patient has been on metformin 750 mg 2 times daily. Hemoglobin A1c continues to rise and is currently had 7.9. All his home sugars have been above 150. He has not been exercising as much.   Advised to add glimepiride 1 mg tablet–half a tablet with breakf

## 2018-08-29 NOTE — PATIENT INSTRUCTIONS
Problem List Items Addressed This Visit        Unprioritized    Essential hypertension     Blood pressure 130/70, pulse 82, temperature 97.8 °F (36.6 °C), temperature source Oral, resp.  rate 22, height 6' (1.829 m), weight 237 lb 1.6 oz (107.5 kg), SpO2 95

## 2018-08-29 NOTE — ASSESSMENT & PLAN NOTE
Blood pressure 130/70, pulse 82, temperature 97.8 °F (36.6 °C), temperature source Oral, resp. rate 22, height 6' (1.829 m), weight 237 lb 1.6 oz (107.5 kg), SpO2 95 %. Pressure seems stable and well controlled.   He has not been on any medications at this

## 2018-10-10 ENCOUNTER — OFFICE VISIT (OUTPATIENT)
Dept: INTERNAL MEDICINE CLINIC | Facility: CLINIC | Age: 68
End: 2018-10-10
Payer: MEDICARE

## 2018-10-10 VITALS
SYSTOLIC BLOOD PRESSURE: 134 MMHG | WEIGHT: 234.38 LBS | BODY MASS INDEX: 31.74 KG/M2 | TEMPERATURE: 98 F | HEIGHT: 72 IN | RESPIRATION RATE: 20 BRPM | DIASTOLIC BLOOD PRESSURE: 79 MMHG | HEART RATE: 76 BPM

## 2018-10-10 DIAGNOSIS — E11.65 UNCONTROLLED TYPE 2 DIABETES MELLITUS WITH HYPERGLYCEMIA (HCC): Primary | ICD-10-CM

## 2018-10-10 DIAGNOSIS — Z23 NEED FOR VACCINATION: ICD-10-CM

## 2018-10-10 PROCEDURE — 90653 IIV ADJUVANT VACCINE IM: CPT | Performed by: INTERNAL MEDICINE

## 2018-10-10 PROCEDURE — 99214 OFFICE O/P EST MOD 30 MIN: CPT | Performed by: INTERNAL MEDICINE

## 2018-10-10 PROCEDURE — G0008 ADMIN INFLUENZA VIRUS VAC: HCPCS | Performed by: INTERNAL MEDICINE

## 2018-10-10 PROCEDURE — G0463 HOSPITAL OUTPT CLINIC VISIT: HCPCS | Performed by: INTERNAL MEDICINE

## 2018-10-10 NOTE — ASSESSMENT & PLAN NOTE
Patient's blood sugars were elevated to about 180 and above and his A1c was at 7.9 at his last visit. He was on metformin 750 mg 2 times daily, he was advised to start on glimepiride 1 mg–half a tablet with breakfast daily.   His home sugars have all come

## 2018-10-10 NOTE — PROGRESS NOTES
HPI:    Patient ID: Keila Roland is a 70year old male. Home sugars are much improved all about 140      Diabetes   He presents for his follow-up diabetic visit. He has type 2 diabetes mellitus. No MedicAlert identification noted.  His disease course ha tablet (100 mg total) by mouth daily. Disp: 90 tablet Rfl: 1   Glucosamine-Chondroitin (GLUCOSAMINE CHONDR COMPLEX OR) Take by mouth.  Disp:  Rfl:    Glucose Blood (VINNIE CONTOUR TEST) In Vitro Strip USE ONE STRIP TO CHECK GLUCOSE TWICE DAILY Disp: 100 stri note and vitals reviewed.              ASSESSMENT/PLAN:     Problem List Items Addressed This Visit        Unprioritized    Type II diabetes mellitus, uncontrolled ([facility] [address] - Primary     Patient's blood sugars were elevated to about 180 and above and his A1c w

## 2018-12-05 NOTE — TELEPHONE ENCOUNTER
Cholesterol Medications  Protocol Criteria:  · Appointment scheduled in the past 12 months or in the next 3 months  · ALT & LDL on file in the past 12 months  · ALT result < 80  · LDL result <130   Recent Outpatient Visits            1 month ago Uncontroll Results   Component Value Date    A1C 7.9 (H) 08/23/2018     Lab Results   Component Value Date    CREATSERUM 1.02 08/23/2018

## 2018-12-07 RX ORDER — METFORMIN HYDROCHLORIDE 750 MG/1
750 TABLET, EXTENDED RELEASE ORAL 2 TIMES DAILY WITH MEALS
Qty: 180 TABLET | Refills: 1 | Status: SHIPPED | OUTPATIENT
Start: 2018-12-07 | End: 2019-04-17

## 2018-12-07 RX ORDER — ALLOPURINOL 100 MG/1
100 TABLET ORAL DAILY
Qty: 90 TABLET | Refills: 1 | Status: SHIPPED | OUTPATIENT
Start: 2018-12-07 | End: 2019-04-17

## 2018-12-07 RX ORDER — ATORVASTATIN CALCIUM 10 MG/1
TABLET, FILM COATED ORAL
Qty: 90 TABLET | Refills: 1 | Status: SHIPPED | OUTPATIENT
Start: 2018-12-07 | End: 2019-07-17

## 2018-12-10 RX ORDER — METFORMIN HYDROCHLORIDE 750 MG/1
TABLET, EXTENDED RELEASE ORAL
Qty: 180 TABLET | Refills: 1 | Status: SHIPPED | OUTPATIENT
Start: 2018-12-10 | End: 2019-04-17

## 2018-12-10 RX ORDER — ALLOPURINOL 100 MG/1
TABLET ORAL
Qty: 90 TABLET | Refills: 1 | Status: SHIPPED | OUTPATIENT
Start: 2018-12-10 | End: 2019-04-17

## 2019-04-09 ENCOUNTER — APPOINTMENT (OUTPATIENT)
Dept: LAB | Facility: HOSPITAL | Age: 69
End: 2019-04-09
Attending: INTERNAL MEDICINE
Payer: MEDICARE

## 2019-04-09 DIAGNOSIS — E11.65 UNCONTROLLED TYPE 2 DIABETES MELLITUS WITH HYPERGLYCEMIA (HCC): ICD-10-CM

## 2019-04-09 PROCEDURE — 36415 COLL VENOUS BLD VENIPUNCTURE: CPT

## 2019-04-09 PROCEDURE — 83036 HEMOGLOBIN GLYCOSYLATED A1C: CPT

## 2019-04-09 PROCEDURE — 80053 COMPREHEN METABOLIC PANEL: CPT

## 2019-04-17 ENCOUNTER — OFFICE VISIT (OUTPATIENT)
Dept: INTERNAL MEDICINE CLINIC | Facility: CLINIC | Age: 69
End: 2019-04-17
Payer: MEDICARE

## 2019-04-17 VITALS
HEART RATE: 85 BPM | WEIGHT: 234 LBS | SYSTOLIC BLOOD PRESSURE: 130 MMHG | DIASTOLIC BLOOD PRESSURE: 66 MMHG | BODY MASS INDEX: 31.69 KG/M2 | HEIGHT: 72 IN

## 2019-04-17 DIAGNOSIS — M10.9 GOUT, UNSPECIFIED CAUSE, UNSPECIFIED CHRONICITY, UNSPECIFIED SITE: ICD-10-CM

## 2019-04-17 DIAGNOSIS — I10 ESSENTIAL HYPERTENSION: Primary | ICD-10-CM

## 2019-04-17 DIAGNOSIS — E11.9 TYPE 2 DIABETES MELLITUS WITHOUT COMPLICATION, WITHOUT LONG-TERM CURRENT USE OF INSULIN (HCC): ICD-10-CM

## 2019-04-17 PROCEDURE — 99214 OFFICE O/P EST MOD 30 MIN: CPT | Performed by: PHYSICIAN ASSISTANT

## 2019-04-17 PROCEDURE — G0463 HOSPITAL OUTPT CLINIC VISIT: HCPCS | Performed by: PHYSICIAN ASSISTANT

## 2019-04-17 RX ORDER — GLIMEPIRIDE 1 MG/1
TABLET ORAL
Qty: 135 TABLET | Refills: 1 | Status: SHIPPED | OUTPATIENT
Start: 2019-04-17 | End: 2020-05-20

## 2019-04-17 RX ORDER — METFORMIN HYDROCHLORIDE 750 MG/1
750 TABLET, EXTENDED RELEASE ORAL 2 TIMES DAILY WITH MEALS
Qty: 180 TABLET | Refills: 1 | Status: SHIPPED | OUTPATIENT
Start: 2019-04-17 | End: 2019-07-17

## 2019-04-17 RX ORDER — ALLOPURINOL 100 MG/1
TABLET ORAL
Qty: 90 TABLET | Refills: 1 | Status: SHIPPED | OUTPATIENT
Start: 2019-04-17 | End: 2019-07-17

## 2019-04-17 NOTE — PROGRESS NOTES
HPI:    Patient ID: Liana Valverde is a 70year old male. HPI   Patient with history of hypertension, hyperlipidemia, type 2 diabetes, depression, gout, and vitamin D deficiency presents for follow-up.   Was last seen in the office on 10/at that time was mouth. take 1 by Oral route  every day Disp:  Rfl:      Allergies:No Known Allergies   PHYSICAL EXAM:   Physical Exam   Nursing note and vitals reviewed. Constitutional: He appears well-developed and well-nourished.    HENT:   Head: Normocephalic and atra a tablet daily with breakfast   • allopurinol 100 MG Oral Tab 90 tablet 1     Sig: TAKE ONE TABLET BY MOUTH ONCE DAILY       Imaging & Referrals:  None         BILLY#[id number]

## 2019-07-17 ENCOUNTER — OFFICE VISIT (OUTPATIENT)
Dept: INTERNAL MEDICINE CLINIC | Facility: CLINIC | Age: 69
End: 2019-07-17
Payer: MEDICARE

## 2019-07-17 VITALS
RESPIRATION RATE: 22 BRPM | WEIGHT: 233.31 LBS | BODY MASS INDEX: 31.95 KG/M2 | SYSTOLIC BLOOD PRESSURE: 131 MMHG | TEMPERATURE: 98 F | DIASTOLIC BLOOD PRESSURE: 78 MMHG | HEIGHT: 71.5 IN | HEART RATE: 71 BPM

## 2019-07-17 DIAGNOSIS — Z13.31 DEPRESSION SCREENING: ICD-10-CM

## 2019-07-17 DIAGNOSIS — Z12.5 PROSTATE CANCER SCREENING: ICD-10-CM

## 2019-07-17 DIAGNOSIS — E11.9 TYPE 2 DIABETES MELLITUS WITHOUT COMPLICATION, WITHOUT LONG-TERM CURRENT USE OF INSULIN (HCC): ICD-10-CM

## 2019-07-17 DIAGNOSIS — D22.9 ATYPICAL NEVI: ICD-10-CM

## 2019-07-17 DIAGNOSIS — Z13.39 SCREENING FOR ALCOHOL PROBLEM: ICD-10-CM

## 2019-07-17 DIAGNOSIS — Z00.00 MEDICARE ANNUAL WELLNESS VISIT, SUBSEQUENT: Primary | ICD-10-CM

## 2019-07-17 DIAGNOSIS — E78.5 HYPERLIPIDEMIA, UNSPECIFIED HYPERLIPIDEMIA TYPE: ICD-10-CM

## 2019-07-17 DIAGNOSIS — I10 ESSENTIAL HYPERTENSION: ICD-10-CM

## 2019-07-17 DIAGNOSIS — Z00.00 ENCOUNTER FOR ANNUAL HEALTH EXAMINATION: ICD-10-CM

## 2019-07-17 DIAGNOSIS — E55.9 VITAMIN D DEFICIENCY: ICD-10-CM

## 2019-07-17 DIAGNOSIS — M10.9 GOUT, UNSPECIFIED CAUSE, UNSPECIFIED CHRONICITY, UNSPECIFIED SITE: ICD-10-CM

## 2019-07-17 DIAGNOSIS — F32.A MILD DEPRESSION: ICD-10-CM

## 2019-07-17 PROBLEM — R63.4 WEIGHT LOSS: Status: RESOLVED | Noted: 2017-09-07 | Resolved: 2019-07-17

## 2019-07-17 PROCEDURE — 99497 ADVNCD CARE PLAN 30 MIN: CPT | Performed by: INTERNAL MEDICINE

## 2019-07-17 PROCEDURE — G0439 PPPS, SUBSEQ VISIT: HCPCS | Performed by: INTERNAL MEDICINE

## 2019-07-17 RX ORDER — ATORVASTATIN CALCIUM 10 MG/1
TABLET, FILM COATED ORAL
Qty: 90 TABLET | Refills: 1 | Status: SHIPPED | OUTPATIENT
Start: 2019-07-17 | End: 2020-01-08

## 2019-07-17 RX ORDER — ALLOPURINOL 100 MG/1
TABLET ORAL
Qty: 90 TABLET | Refills: 1 | Status: SHIPPED | OUTPATIENT
Start: 2019-07-17 | End: 2020-05-20

## 2019-07-17 RX ORDER — METFORMIN HYDROCHLORIDE 750 MG/1
750 TABLET, EXTENDED RELEASE ORAL 2 TIMES DAILY WITH MEALS
Qty: 180 TABLET | Refills: 1 | Status: SHIPPED | OUTPATIENT
Start: 2019-07-17 | End: 2020-05-20

## 2019-07-17 NOTE — PROGRESS NOTES
HPI:   David Awan is a 70year old male who presents for a Medicare Subsequent Annual Wellness visit (Pt already had Initial Annual Wellness).     His last annual assessment has been over 1 year: Annual Physical due on 03/28/2019         Fall/Risk Asse Care Team:  Johnny Ryan MD as PCP - General (Internal Medicine)  John[facility] Ryan [facility]    Patient Active Problem List:     Type 2 diabetes mellitus without complication, without long-term current use of insulin [facility])     Essential hypertension omega-3 fatty acids 1000 MG Oral Cap Take 1,000 mg by mouth daily. aspirin (ASPIRIN ADULT LOW STRENGTH) 81 MG Oral Tab EC Take  by mouth.  take 1 tablet (81MG)  by oral route  every day   Cholecalciferol (D-2000 MAXIMUM STRENGTH) 2000 UNITS Oral Tab Binu Primblake (Oral)   Resp 22   Ht 5' 11.5\" (1.816 m)   Wt 233 lb 4.8 oz (105.8 kg)   BMI 32.09 kg/m²   Estimated body mass index is 32.09 kg/m² as calculated from the following:    Height as of this encounter: 5' 11.5\" (1.816 m).     Weight as of this encounter: 233 and time and thin. He appears well-developed. No distress. HENT:   Head: Normocephalic and atraumatic. Right Ear: Tympanic membrane normal.   Left Ear: Tympanic membrane normal.   Eyes: Pupils are equal, round, and reactive to light.  Conjunctivae and E List Items Addressed This Visit        Unprioritized    Atypical nevi     Dermatology  evaluation discussed–Dr. Nolan [facility], [phone] for an appointment         Essential hypertension     Blood pressure 131/78, pulse 71, temperature 97.5 °F (36.4 °C), tempera shingles vaccine–Shingrix 2 doses, 2-4 months apart. Mild depression ([address]     Depressive disorder–patient seems better, he discontinued the Lexapro as caused some drowsiness. Continue to monitor.          Type 2 diabetes mellitus without complic MIN    Body mass index (BMI) of 32.0-32.9 in adult  -     BEHAVIOR  OBESITY 15M    Other orders  -     allopurinol 100 MG Oral Tab; TAKE ONE TABLET BY MOUTH ONCE DAILY  -     atorvastatin 10 MG Oral Tab; TAKE ONE TABLET BY MOUTH ONCE DAILY  -     me visit. No flowsheet data found. Fecal Occult Blood Annually No results found for: FOB No flowsheet data found.     Glaucoma Screening      Ophthalmology Visit Annually: Diabetics, FHx Glaucoma, AA>50, > 65 Data entered on: 7/12/2018   Hilton Madden found. Lupe Hoskins was screened today and had CAGE screening score of Total Score: 0 putting him at low risk of alcohol abuse. Lupe Hoskins is a 70year old male with a Body mass index is 32.09 kg/m².    HPI:   Lupe Hoskins was screened and

## 2019-07-17 NOTE — ASSESSMENT & PLAN NOTE
Lipid panel and liver function tests look stable and atorvastatin at 10 mg daily. For recheck labs which have been ordered  Continue the same dose of medications.

## 2019-07-17 NOTE — ASSESSMENT & PLAN NOTE
Normal exam.  Labs as ordered. Skin check–normal but does have multiple scattered nevi–has not had a skin check done so far. Recommend follow-up with dermatology–Dr. Fitz Hernandes, [phone] for an appointment. No cervical, axillary, inguinal lymphadenopathy.

## 2019-07-17 NOTE — ASSESSMENT & PLAN NOTE
Patient's blood sugars were elevated to about 180 and above and his A1c was at 6.8 at his last visit. He was on metformin 750 mg 2 times daily, he was advised to start on glimepiride 1 mg–half a tablet with breakfast daily.   His home sugars have all come

## 2019-07-17 NOTE — ASSESSMENT & PLAN NOTE
Blood pressure 131/78, pulse 71, temperature 97.5 °F (36.4 °C), temperature source Oral, resp. rate 22, height 5' 11.5\" (1.816 m), weight 233 lb 4.8 oz (105.8 kg). Pressure seems stable and well controlled.   He has not been on any medications at this amanda

## 2019-07-17 NOTE — PATIENT INSTRUCTIONS
Problem List Items Addressed This Visit        Unprioritized    Atypical nevi     Dermatology  evaluation discussed–Dr. Carla Caal, [phone] for an appointment         Essential hypertension     Blood pressure 131/78, pulse 71, temperature 97.5 °F (36.4 °C), Recommend shingles vaccine–Shingrix 2 doses, 2-4 months apart. Mild depression ([address]     Depressive disorder–patient seems better, he discontinued the Lexapro as caused some drowsiness. Continue to monitor.          Type 2 diabetes mellitus w Internal Lab or Procedure External Lab or Procedure   Diabetes Screening      HbgA1C     At Least  Annually for Diabetics HgbA1C (%)   Date Value   04/09/2019 6.8 (H)    No flowsheet data found.     Fasting Blood Sugar (FSB)   Patient must be diagnosed with Covered Annually No results found for: FOB, OCCULTSTOOL No flowsheet data found.      Barium Enema-   uncomfortable but covered  Covered but uncomfortable   Glaucoma Screening      Ophthalmology Visit   Covered annually for Diabetics, people with Glaucoma needed    Zoster (Not covered by Medicare Part B) No orders found for this or any previous visit. This may be covered with your pharmacy  prescription benefits     Recommended Websites for Advanced Directives    [email or web address]

## 2019-08-08 ENCOUNTER — LAB ENCOUNTER (OUTPATIENT)
Dept: LAB | Facility: HOSPITAL | Age: 69
End: 2019-08-08
Attending: INTERNAL MEDICINE
Payer: MEDICARE

## 2019-08-08 DIAGNOSIS — M10.9 GOUT, UNSPECIFIED CAUSE, UNSPECIFIED CHRONICITY, UNSPECIFIED SITE: ICD-10-CM

## 2019-08-08 DIAGNOSIS — E55.9 VITAMIN D DEFICIENCY: ICD-10-CM

## 2019-08-08 DIAGNOSIS — E11.9 TYPE 2 DIABETES MELLITUS WITHOUT COMPLICATION, WITHOUT LONG-TERM CURRENT USE OF INSULIN (HCC): ICD-10-CM

## 2019-08-08 LAB
ALBUMIN SERPL-MCNC: 3.6 G/DL (ref 3.4–5)
ALBUMIN/GLOB SERPL: 0.9 {RATIO} (ref 1–2)
ALP LIVER SERPL-CCNC: 65 U/L (ref 45–117)
ALT SERPL-CCNC: 26 U/L (ref 16–61)
ANION GAP SERPL CALC-SCNC: 7 MMOL/L (ref 0–18)
AST SERPL-CCNC: 19 U/L (ref 15–37)
BASOPHILS # BLD AUTO: 0.06 X10(3) UL (ref 0–0.2)
BASOPHILS NFR BLD AUTO: 0.6 %
BILIRUB SERPL-MCNC: 0.5 MG/DL (ref 0.1–2)
BILIRUB UR QL: NEGATIVE
BUN BLD-MCNC: 21 MG/DL (ref 7–18)
BUN/CREAT SERPL: 17.6 (ref 10–20)
CALCIUM BLD-MCNC: 9.7 MG/DL (ref 8.5–10.1)
CHLORIDE SERPL-SCNC: 104 MMOL/L (ref 98–112)
CHOLEST SMN-MCNC: 114 MG/DL (ref ?–200)
CLARITY UR: CLEAR
CO2 SERPL-SCNC: 29 MMOL/L (ref 21–32)
COLOR UR: YELLOW
CREAT BLD-MCNC: 1.19 MG/DL (ref 0.7–1.3)
DEPRECATED RDW RBC AUTO: 41.4 FL (ref 35.1–46.3)
EOSINOPHIL # BLD AUTO: 0.25 X10(3) UL (ref 0–0.7)
EOSINOPHIL NFR BLD AUTO: 2.3 %
ERYTHROCYTE [DISTWIDTH] IN BLOOD BY AUTOMATED COUNT: 12 % (ref 11–15)
GLOBULIN PLAS-MCNC: 3.9 G/DL (ref 2.8–4.4)
GLUCOSE BLD-MCNC: 117 MG/DL (ref 70–99)
GLUCOSE UR-MCNC: NEGATIVE MG/DL
HCT VFR BLD AUTO: 47.3 % (ref 39–53)
HDLC SERPL-MCNC: 36 MG/DL (ref 40–59)
HGB BLD-MCNC: 15.9 G/DL (ref 13–17.5)
HGB UR QL STRIP.AUTO: NEGATIVE
IMM GRANULOCYTES # BLD AUTO: 0.03 X10(3) UL (ref 0–1)
IMM GRANULOCYTES NFR BLD: 0.3 %
KETONES UR-MCNC: NEGATIVE MG/DL
LDLC SERPL CALC-MCNC: 46 MG/DL (ref ?–100)
LEUKOCYTE ESTERASE UR QL STRIP.AUTO: NEGATIVE
LYMPHOCYTES # BLD AUTO: 3.82 X10(3) UL (ref 1–4)
LYMPHOCYTES NFR BLD AUTO: 35.3 %
M PROTEIN MFR SERPL ELPH: 7.5 G/DL (ref 6.4–8.2)
MCH RBC QN AUTO: 32.1 PG (ref 26–34)
MCHC RBC AUTO-ENTMCNC: 33.6 G/DL (ref 31–37)
MCV RBC AUTO: 95.4 FL (ref 80–100)
MONOCYTES # BLD AUTO: 0.81 X10(3) UL (ref 0.1–1)
MONOCYTES NFR BLD AUTO: 7.5 %
NEUTROPHILS # BLD AUTO: 5.84 X10 (3) UL (ref 1.5–7.7)
NEUTROPHILS # BLD AUTO: 5.84 X10(3) UL (ref 1.5–7.7)
NEUTROPHILS NFR BLD AUTO: 54 %
NITRITE UR QL STRIP.AUTO: NEGATIVE
NONHDLC SERPL-MCNC: 78 MG/DL (ref ?–130)
OSMOLALITY SERPL CALC.SUM OF ELEC: 294 MOSM/KG (ref 275–295)
PATIENT FASTING: YES
PATIENT FASTING: YES
PH UR: 6 [PH] (ref 5–8)
PLATELET # BLD AUTO: 264 10(3)UL (ref 150–450)
POTASSIUM SERPL-SCNC: 4.5 MMOL/L (ref 3.5–5.1)
PROT UR-MCNC: NEGATIVE MG/DL
RBC # BLD AUTO: 4.96 X10(6)UL (ref 3.8–5.8)
SODIUM SERPL-SCNC: 140 MMOL/L (ref 136–145)
SP GR UR STRIP: 1.01 (ref 1–1.03)
TRIGL SERPL-MCNC: 160 MG/DL (ref 30–149)
TSI SER-ACNC: 2.04 MIU/ML (ref 0.36–3.74)
URATE SERPL-MCNC: 6 MG/DL (ref 3.5–7.2)
UROBILINOGEN UR STRIP-ACNC: <2
VIT B12 SERPL-MCNC: 650 PG/ML (ref 193–986)
VIT C UR-MCNC: NEGATIVE MG/DL
VLDLC SERPL CALC-MCNC: 32 MG/DL (ref 0–30)
WBC # BLD AUTO: 10.8 X10(3) UL (ref 4–11)

## 2019-08-08 PROCEDURE — 82306 VITAMIN D 25 HYDROXY: CPT

## 2019-08-08 PROCEDURE — 85025 COMPLETE CBC W/AUTO DIFF WBC: CPT

## 2019-08-08 PROCEDURE — 84443 ASSAY THYROID STIM HORMONE: CPT

## 2019-08-08 PROCEDURE — 84550 ASSAY OF BLOOD/URIC ACID: CPT

## 2019-08-08 PROCEDURE — 81003 URINALYSIS AUTO W/O SCOPE: CPT

## 2019-08-08 PROCEDURE — 80053 COMPREHEN METABOLIC PANEL: CPT

## 2019-08-08 PROCEDURE — 82607 VITAMIN B-12: CPT

## 2019-08-08 PROCEDURE — 80061 LIPID PANEL: CPT

## 2019-08-08 PROCEDURE — 36415 COLL VENOUS BLD VENIPUNCTURE: CPT

## 2019-08-09 LAB — 25(OH)D3 SERPL-MCNC: 41.7 NG/ML (ref 30–100)

## 2019-10-28 ENCOUNTER — PATIENT MESSAGE (OUTPATIENT)
Dept: INTERNAL MEDICINE CLINIC | Facility: CLINIC | Age: 69
End: 2019-10-28

## 2019-10-29 NOTE — TELEPHONE ENCOUNTER
From: Micaela Cain  To: Lynda Walters MD  Sent: 10/28/2019 5:54 PM CDT  Subject: Other    Dear Dr. Brandy Vergara,    Please be advised that I Liana Valverde had his flu shot at [facility] in [facility] on 10-28-19.   Sincerely,  Liana Valverde

## 2020-01-08 DIAGNOSIS — E78.5 HYPERLIPIDEMIA, UNSPECIFIED HYPERLIPIDEMIA TYPE: ICD-10-CM

## 2020-01-08 RX ORDER — ATORVASTATIN CALCIUM 10 MG/1
TABLET, FILM COATED ORAL
Qty: 90 TABLET | Refills: 1 | OUTPATIENT
Start: 2020-01-08

## 2020-01-08 RX ORDER — ATORVASTATIN CALCIUM 10 MG/1
TABLET, FILM COATED ORAL
Qty: 90 TABLET | Refills: 1 | Status: SHIPPED | OUTPATIENT
Start: 2020-01-08 | End: 2020-05-20

## 2020-01-09 NOTE — TELEPHONE ENCOUNTER
Refill passed per [facility] protocol.   Cholesterol Medications  Protocol Criteria:  · Appointment scheduled in the past 12 months or in the next 3 months  · ALT & LDL on file in the past 12 months  · ALT result < 80  · LDL result <130   Recent Outpat

## 2020-02-14 ENCOUNTER — OFFICE VISIT (OUTPATIENT)
Dept: DERMATOLOGY CLINIC | Facility: CLINIC | Age: 70
End: 2020-02-14
Payer: MEDICARE

## 2020-02-14 DIAGNOSIS — L81.4 LENTIGO: ICD-10-CM

## 2020-02-14 DIAGNOSIS — L82.1 SEBORRHEIC KERATOSES: ICD-10-CM

## 2020-02-14 DIAGNOSIS — D23.30 BENIGN NEOPLASM OF SKIN OF FACE: ICD-10-CM

## 2020-02-14 DIAGNOSIS — D23.60 BENIGN NEOPLASM OF SKIN OF UPPER LIMB, INCLUDING SHOULDER, UNSPECIFIED LATERALITY: ICD-10-CM

## 2020-02-14 DIAGNOSIS — D23.4 BENIGN NEOPLASM OF SCALP AND SKIN OF NECK: ICD-10-CM

## 2020-02-14 DIAGNOSIS — D23.5 BENIGN NEOPLASM OF SKIN OF TRUNK, EXCEPT SCROTUM: ICD-10-CM

## 2020-02-14 DIAGNOSIS — L82.0 INFLAMED SEBORRHEIC KERATOSIS: Primary | ICD-10-CM

## 2020-02-14 PROCEDURE — 99202 OFFICE O/P NEW SF 15 MIN: CPT | Performed by: DERMATOLOGY

## 2020-02-14 PROCEDURE — G0463 HOSPITAL OUTPT CLINIC VISIT: HCPCS | Performed by: DERMATOLOGY

## 2020-02-24 NOTE — PROGRESS NOTES
Obey Falcon is a 78year old male. HPI:     CC:  Patient presents with:  Lesion: LOV 6/22/12. (Laurita Myers). New pt. pt presenting today with multiple lesions to head for over 10years. Lesion has changed in size and color. Denies pain or itching. .Denies fa Glucosamine-Chondroitin (GLUCOSAMINE CHONDR COMPLEX OR) Take by mouth. • Glucose Blood (VINNIE CONTOUR TEST) In Vitro Strip USE ONE STRIP TO CHECK GLUCOSE TWICE DAILY 100 strip 3   • omega-3 fatty acids 1000 MG Oral Cap Take 1,000 mg by mouth daily. beer/month      Drug use: No      Sexual activity: Not Currently        Partners: Female    Lifestyle      Physical activity:        Days per week: Not on file        Minutes per session: Not on file      Stress: Not on file    Relationships      Social co cancer. Patient presents with concerns above. Patient has been in their usual state of health. History, medications, allergies reviewed as noted. ROS:  Denies any other systemic complaints. No new or changeing lesions other than noted above. scalp consider trial of cryo. Reassurance given    No other susupicious lesions on todays  exam.    Please refer to map for specific lesions. See additional diagnoses. Pros cons of various therapies, risks benefits discussed. Pathophysiology discussed wi

## 2020-03-11 ENCOUNTER — APPOINTMENT (OUTPATIENT)
Dept: LAB | Facility: HOSPITAL | Age: 70
End: 2020-03-11
Attending: INTERNAL MEDICINE
Payer: MEDICARE

## 2020-03-11 DIAGNOSIS — Z12.5 PROSTATE CANCER SCREENING: ICD-10-CM

## 2020-03-11 LAB — COMPLEXED PSA SERPL-MCNC: 2.18 NG/ML (ref ?–4)

## 2020-03-11 PROCEDURE — 36415 COLL VENOUS BLD VENIPUNCTURE: CPT

## 2020-05-20 ENCOUNTER — VIRTUAL PHONE E/M (OUTPATIENT)
Dept: INTERNAL MEDICINE CLINIC | Facility: CLINIC | Age: 70
End: 2020-05-20
Payer: MEDICARE

## 2020-05-20 VITALS — BODY MASS INDEX: 31 KG/M2 | WEIGHT: 225 LBS

## 2020-05-20 DIAGNOSIS — M10.9 GOUT, UNSPECIFIED CAUSE, UNSPECIFIED CHRONICITY, UNSPECIFIED SITE: ICD-10-CM

## 2020-05-20 DIAGNOSIS — E55.9 VITAMIN D DEFICIENCY: ICD-10-CM

## 2020-05-20 DIAGNOSIS — E11.9 TYPE 2 DIABETES MELLITUS WITHOUT COMPLICATION, WITHOUT LONG-TERM CURRENT USE OF INSULIN (HCC): Primary | ICD-10-CM

## 2020-05-20 DIAGNOSIS — I10 ESSENTIAL HYPERTENSION: ICD-10-CM

## 2020-05-20 DIAGNOSIS — E78.5 HYPERLIPIDEMIA, UNSPECIFIED HYPERLIPIDEMIA TYPE: ICD-10-CM

## 2020-05-20 PROCEDURE — 99443 PHONE E/M BY PHYS 21-30 MIN: CPT | Performed by: INTERNAL MEDICINE

## 2020-05-20 RX ORDER — GLIMEPIRIDE 1 MG/1
TABLET ORAL
Qty: 90 TABLET | Refills: 1 | Status: SHIPPED | OUTPATIENT
Start: 2020-05-20 | End: 2020-08-20

## 2020-05-20 RX ORDER — ALLOPURINOL 100 MG/1
TABLET ORAL
Qty: 90 TABLET | Refills: 1 | Status: SHIPPED | OUTPATIENT
Start: 2020-05-20 | End: 2020-08-20

## 2020-05-20 RX ORDER — METFORMIN HYDROCHLORIDE 750 MG/1
750 TABLET, EXTENDED RELEASE ORAL 2 TIMES DAILY WITH MEALS
Qty: 180 TABLET | Refills: 1 | Status: SHIPPED | OUTPATIENT
Start: 2020-05-20 | End: 2020-08-20

## 2020-05-20 RX ORDER — ATORVASTATIN CALCIUM 10 MG/1
TABLET, FILM COATED ORAL
Qty: 90 TABLET | Refills: 1 | Status: SHIPPED | OUTPATIENT
Start: 2020-05-20 | End: 2020-08-20

## 2020-05-20 NOTE — ASSESSMENT & PLAN NOTE
Lipid panel and liver function tests have been stable on atorvastatin at 10 mg daily. Continue the same dose of medication, recheck labs as ordered.

## 2020-05-20 NOTE — PATIENT INSTRUCTIONS
Problem List Items Addressed This Visit        Unprioritized    Essential hypertension     Blood pressures have been stable. Patient is not on any medications and prefers not to be on medications at this time.   EGFR has been stable and renal functions hav

## 2020-05-20 NOTE — PROGRESS NOTES
HPI:    Patient ID: Jed Velazco is a 78year old male. Virtual/Telephone Check-In    Jed Velazco verbally consents to a Virtual/Telephone Check-In service on 05/20/20.   Patient has been referred to the [facility] website at [email or web address] to r controlled. Pertinent negatives include no blurred vision, chest pain, peripheral edema, PND or shortness of breath. There are no associated agents to hypertension.  Risk factors for coronary artery disease include diabetes mellitus, dyslipidemia and sedent TAKE ONE TABLET BY MOUTH ONCE DAILY 90 tablet 1   • glimepiride 1 MG Oral Tab Half a tablet daily with breakfast 90 tablet 1   • Glucosamine-Chondroitin (GLUCOSAMINE CHONDR COMPLEX OR) Take by mouth.      • Glucose Blood (VINNIE CONTOUR TEST) In Vitro Strip Essential hypertension     Blood pressures have been stable. Patient is not on any medications and prefers not to be on medications at this time. EGFR has been stable and renal functions have remained stable. Continue to monitor.   Advised to drink plent

## 2020-05-20 NOTE — ASSESSMENT & PLAN NOTE
Blood pressures have been stable. Patient is not on any medications and prefers not to be on medications at this time. EGFR has been stable and renal functions have remained stable. Continue to monitor.   Advised to drink plenty of fluids and restrict sa

## 2020-06-01 ENCOUNTER — LAB ENCOUNTER (OUTPATIENT)
Dept: LAB | Facility: HOSPITAL | Age: 70
End: 2020-06-01
Attending: INTERNAL MEDICINE
Payer: MEDICARE

## 2020-06-01 DIAGNOSIS — M10.9 GOUT, UNSPECIFIED CAUSE, UNSPECIFIED CHRONICITY, UNSPECIFIED SITE: ICD-10-CM

## 2020-06-01 DIAGNOSIS — E11.9 TYPE 2 DIABETES MELLITUS WITHOUT COMPLICATION, WITHOUT LONG-TERM CURRENT USE OF INSULIN (HCC): ICD-10-CM

## 2020-06-01 LAB
ABSOLUTE IMMATURE GRANULOCYTES (OFFPRE24): 0.03
BASOPHILS # BLD: 0.08 10*3/UL
CHOLEST SERPL-MCNC: 106 MG/DL
EOSINOPHIL # BLD: 0.17 10*3/UL
EOSINOPHIL NFR BLD: 1.9 %
HCT VFR BLD CALC: 46.3 %
HDLC SERPL-MCNC: 39 MG/DL
HGB BLD-MCNC: 15.2 G/DL
IMMATURE GRANULOCYTES (OFFPRE25): 0.3
LDLC SERPL CALC-MCNC: 51 MG/DL
LENGTH OF FAST TIME PATIENT: YES H
LYMPHOCYTES # BLD: 2.64 10*3/UL
LYMPHOCYTES NFR BLD: 28.9 %
MCH RBC QN AUTO: 31.9 PG
MCHC RBC AUTO-ENTMCNC: 32.8 G/DL
MCV RBC AUTO: 97.3 FL
MONOCYTES # BLD: 0.88 10*3/UL
MONOCYTES NFR BLD: 9.6 %
NEUTROPHILS # BLD: 5.33 10*3/UL
NEUTROPHILS NFR BLD: 58.4 %
NONHDLC SERPL-MCNC: 67 MG/DL
PLATELET # BLD: 273 K/MCL
RBC # BLD: 4.76 10*6/UL
TRIGL SERPL-MCNC: 79 MG/DL
VLDLC SERPL CALC-MCNC: 16 MG/DL
WBC # BLD: 9.1 K/MCL

## 2020-06-01 PROCEDURE — 83036 HEMOGLOBIN GLYCOSYLATED A1C: CPT

## 2020-06-01 PROCEDURE — 80061 LIPID PANEL: CPT

## 2020-06-01 PROCEDURE — 84550 ASSAY OF BLOOD/URIC ACID: CPT

## 2020-06-01 PROCEDURE — 82570 ASSAY OF URINE CREATININE: CPT

## 2020-06-01 PROCEDURE — 80053 COMPREHEN METABOLIC PANEL: CPT

## 2020-06-01 PROCEDURE — 81001 URINALYSIS AUTO W/SCOPE: CPT

## 2020-06-01 PROCEDURE — 84443 ASSAY THYROID STIM HORMONE: CPT

## 2020-06-01 PROCEDURE — 85025 COMPLETE CBC W/AUTO DIFF WBC: CPT

## 2020-06-01 PROCEDURE — 82043 UR ALBUMIN QUANTITATIVE: CPT

## 2020-06-01 PROCEDURE — 36415 COLL VENOUS BLD VENIPUNCTURE: CPT

## 2020-07-15 ENCOUNTER — APPOINTMENT (OUTPATIENT)
Dept: LAB | Facility: HOSPITAL | Age: 70
End: 2020-07-15
Attending: INTERNAL MEDICINE
Payer: MEDICARE

## 2020-07-15 DIAGNOSIS — I10 ESSENTIAL HYPERTENSION: ICD-10-CM

## 2020-07-15 LAB
ANION GAP SERPL CALC-SCNC: 6 MMOL/L
ANION GAP SERPL CALC-SCNC: 6 MMOL/L (ref 0–18)
BUN BLD-MCNC: 16 MG/DL (ref 7–18)
BUN SERPL-MCNC: 16 MG/DL
BUN/CREAT SERPL: 17
BUN/CREAT SERPL: 17 (ref 10–20)
CALCIUM BLD-MCNC: 9.6 MG/DL (ref 8.5–10.1)
CALCIUM SERPL-MCNC: 9.6 MG/DL
CHLORIDE SERPL-SCNC: 100 MMOL/L
CHLORIDE SERPL-SCNC: 100 MMOL/L (ref 98–112)
CO2 SERPL-SCNC: 29 MMOL/L
CO2 SERPL-SCNC: 29 MMOL/L (ref 21–32)
CREAT BLD-MCNC: 0.94 MG/DL (ref 0.7–1.3)
CREAT SERPL-MCNC: 0.94 MG/DL
GLUCOSE BLD-MCNC: 122 MG/DL (ref 70–99)
GLUCOSE SERPL-MCNC: 122 MG/DL
LENGTH OF FAST TIME PATIENT: YES H
OSMOLALITY SERPL CALC.SUM OF ELEC: 282 MOSM/KG (ref 275–295)
PATIENT FASTING Y/N/NP: YES
POTASSIUM SERPL-SCNC: 4.6 MMOL/L
POTASSIUM SERPL-SCNC: 4.6 MMOL/L (ref 3.5–5.1)
SODIUM SERPL-SCNC: 135 MMOL/L
SODIUM SERPL-SCNC: 135 MMOL/L (ref 136–145)

## 2020-07-15 PROCEDURE — 36415 COLL VENOUS BLD VENIPUNCTURE: CPT

## 2020-07-15 PROCEDURE — 80048 BASIC METABOLIC PNL TOTAL CA: CPT

## 2020-08-20 ENCOUNTER — OFFICE VISIT (OUTPATIENT)
Dept: INTERNAL MEDICINE CLINIC | Facility: CLINIC | Age: 70
End: 2020-08-20
Payer: MEDICARE

## 2020-08-20 VITALS
HEIGHT: 71 IN | WEIGHT: 220 LBS | HEART RATE: 85 BPM | SYSTOLIC BLOOD PRESSURE: 118 MMHG | BODY MASS INDEX: 30.8 KG/M2 | DIASTOLIC BLOOD PRESSURE: 78 MMHG | RESPIRATION RATE: 20 BRPM

## 2020-08-20 DIAGNOSIS — M10.9 GOUT, UNSPECIFIED CAUSE, UNSPECIFIED CHRONICITY, UNSPECIFIED SITE: ICD-10-CM

## 2020-08-20 DIAGNOSIS — Z11.59 NEED FOR HEPATITIS C SCREENING TEST: ICD-10-CM

## 2020-08-20 DIAGNOSIS — H90.8 MIXED CONDUCTIVE AND SENSORINEURAL HEARING LOSS, UNSPECIFIED LATERALITY: ICD-10-CM

## 2020-08-20 DIAGNOSIS — E11.65 UNCONTROLLED TYPE 2 DIABETES MELLITUS WITH HYPERGLYCEMIA (HCC): ICD-10-CM

## 2020-08-20 DIAGNOSIS — D22.9 ATYPICAL NEVI: ICD-10-CM

## 2020-08-20 DIAGNOSIS — E11.9 TYPE 2 DIABETES MELLITUS WITHOUT COMPLICATION, WITHOUT LONG-TERM CURRENT USE OF INSULIN (HCC): ICD-10-CM

## 2020-08-20 DIAGNOSIS — E55.9 VITAMIN D DEFICIENCY: ICD-10-CM

## 2020-08-20 DIAGNOSIS — Z00.00 ENCOUNTER FOR ANNUAL HEALTH EXAMINATION: ICD-10-CM

## 2020-08-20 DIAGNOSIS — E11.65 TYPE 2 DIABETES MELLITUS WITH HYPERGLYCEMIA, WITHOUT LONG-TERM CURRENT USE OF INSULIN (HCC): ICD-10-CM

## 2020-08-20 DIAGNOSIS — F32.A MILD DEPRESSION: ICD-10-CM

## 2020-08-20 DIAGNOSIS — Z00.00 MEDICARE ANNUAL WELLNESS VISIT, SUBSEQUENT: Primary | ICD-10-CM

## 2020-08-20 DIAGNOSIS — E78.5 HYPERLIPIDEMIA, UNSPECIFIED HYPERLIPIDEMIA TYPE: ICD-10-CM

## 2020-08-20 DIAGNOSIS — Z23 NEED FOR VACCINATION: ICD-10-CM

## 2020-08-20 DIAGNOSIS — I10 ESSENTIAL HYPERTENSION: ICD-10-CM

## 2020-08-20 PROBLEM — H43.393 FLOATERS IN VISUAL FIELD, BILATERAL: Status: RESOLVED | Noted: 2018-07-12 | Resolved: 2020-08-20

## 2020-08-20 PROCEDURE — G0439 PPPS, SUBSEQ VISIT: HCPCS | Performed by: INTERNAL MEDICINE

## 2020-08-20 PROCEDURE — G0009 ADMIN PNEUMOCOCCAL VACCINE: HCPCS | Performed by: INTERNAL MEDICINE

## 2020-08-20 PROCEDURE — 90732 PPSV23 VACC 2 YRS+ SUBQ/IM: CPT | Performed by: INTERNAL MEDICINE

## 2020-08-20 PROCEDURE — 99497 ADVNCD CARE PLAN 30 MIN: CPT | Performed by: INTERNAL MEDICINE

## 2020-08-20 RX ORDER — GLIMEPIRIDE 1 MG/1
TABLET ORAL
Qty: 90 TABLET | Refills: 1 | Status: SHIPPED | OUTPATIENT
Start: 2020-08-20 | End: 2021-09-22

## 2020-08-20 RX ORDER — METFORMIN HYDROCHLORIDE 750 MG/1
750 TABLET, EXTENDED RELEASE ORAL 2 TIMES DAILY WITH MEALS
Qty: 180 TABLET | Refills: 1 | Status: SHIPPED | OUTPATIENT
Start: 2020-08-20 | End: 2021-03-15

## 2020-08-20 RX ORDER — ALLOPURINOL 100 MG/1
TABLET ORAL
Qty: 90 TABLET | Refills: 1 | Status: SHIPPED | OUTPATIENT
Start: 2020-08-20 | End: 2021-03-15

## 2020-08-20 RX ORDER — ATORVASTATIN CALCIUM 10 MG/1
TABLET, FILM COATED ORAL
Qty: 90 TABLET | Refills: 1 | Status: SHIPPED | OUTPATIENT
Start: 2020-08-20 | End: 2020-12-02

## 2020-08-20 NOTE — ASSESSMENT & PLAN NOTE
Normal exam.  Labs as ordered. Skin check–normal, 10 nevi present, advised to follow-up with dermatology for an evaluation. Dr. Mario Schneider, [phone] for an evaluation. Difficulty with hearing–transfer to audiology for an evaluation.   No cervical, axillary

## 2020-08-20 NOTE — ASSESSMENT & PLAN NOTE
Blood sugars much improved at this time. Hemoglobin A1c looks great. He has been on metformin 750 mg 2 times daily and was started on glimepiride 1 mg half a tablet daily with breakfast.  All his home sugars look stable.   No low sugar reactions at this t

## 2020-08-20 NOTE — ASSESSMENT & PLAN NOTE
Blood pressure 118/78, pulse 85, resp. rate 20, height 5' 11\" (1.803 m), weight 220 lb (99.8 kg). Blood pressures have remained quite low, does not have much room to start on a blood pressure lowering medication like an ACE inhibitor.   Patient prefers no

## 2020-08-20 NOTE — PROGRESS NOTES
HPI:   Radha Jackson is a 78year old male who presents for a Medicare Subsequent Annual Wellness visit (Pt already had Initial Annual Wellness).   His last annual assessment has been over 1 year: Annual Physical due on 07/17/2020         [facility])  Evelina Davis MD as PCP - [facility]    Patient Active Problem List:     Type 2 diabetes mellitus with hyperglycemia, without long-term current use of insulin ([facility]     Essential hypertension     Hyperlipidemia     Vitamin D deficiency     Atypical n route  every day       MEDICAL INFORMATION:   He  has a past medical history of Amblyopia, Anxiety, Cataracts, bilateral (2005), Cellulitis of left foot, Depression, Diabetes mellitus type 2 without retinopathy ([facility] (2013, 2010, 2005), History of hepatiti Hearing Screening    Screening Method:  Questionnaire  I have a problem hearing over the telephone:  Sometimes I have trouble following the conversations when two or more people are talking at the same time:  Sometimes   I have trouble understanding thin of motion. Neck supple. No JVD present. No thyromegaly present. Cardiovascular: Normal rate, regular rhythm and normal heart sounds. No murmur heard. Edema not present.   Pulmonary/Chest: Effort normal and breath sounds normal. No respiratory distres m), weight 220 lb (99.8 kg). Blood pressures have remained quite low, does not have much room to start on a blood pressure lowering medication like an ACE inhibitor. Patient prefers not to start on medications at this time.   Will monitor over the next fe (Prevnar 13) 10/28/2016   • Pneumovax 23 06/02/2015   • TDAP 06/02/2015     Pneumovac 23 due at this time–provided. Mild depression (HCC)     Mild depressive disorder, has been better for the past 1 year. He has discontinued his Lexapro.   Complain (Completed)             PLAN SUMMARY:   Diagnoses and all orders for this visit:    Medicare annual wellness visit, subsequent    Gout, unspecified cause, unspecified chronicity, unspecified site  -     allopurinol 100 MG Oral Tab; TAKE ONE TABLET BY MOUTH Caitlin Herzog MD, 8/20/2020     General Health     In the past six months, have you lost more than 10 pounds without trying?: 2 - No  Has your appetite been poor?: No  How does the patient maintain a good energy level?: Daily Walks  How would you descr Please get once after your 64th birthday    Hepatitis B for Moderate/High Risk No vaccine history found Medium/high risk factors:   End-stage renal disease   Hemophiliacs who received Factor VIII or IX concentrates   Clients of institutions for the [facility]

## 2020-08-20 NOTE — ASSESSMENT & PLAN NOTE
Lipid panel and liver function tests have been stable on atorvastatin at 10 mg daily. He has tolerated his medications well. Continue on the same dose of medication.   Treadmill stress test done a few years back look normal.  Consider a CT calcium scoring

## 2020-08-20 NOTE — PATIENT INSTRUCTIONS
Problem List Items Addressed This Visit        Unprioritized    Atypical nevi     Dermatology referral provided. Relevant Orders    DERM - INTERNAL    Essential hypertension     Blood pressure 118/78, pulse 85, resp.  rate 20, height 5' 11\" (1.803 and older (58859) 10/10/2018   • Fluzone Vaccine Medicare () 11/01/2017   • HIGH DOSE FLU 65 YRS AND OLDER PRSV FREE SINGLE D (66680) FLU CLINIC 10/28/2016   • Influenza 12/26/2012   • Influenza Vaccine, Preserv Free 10/27/2011   • Pneumococcal (Prevn metFORMIN HCl  MG Oral Tablet 24 Hr    Mixed conductive and sensorineural hearing loss, unspecified laterality        Relevant Orders    OP REFERRAL TO AUDIOLOGY    Need for vaccination        Relevant Orders    PNEUMOCOCCAL IMM (PNEUMOVAX)         J of the following criteria:   • Men who are 73-68 years old and have smoked more than 100 cigarettes in their lifetime   • Anyone with a family history    Colorectal Cancer Screening Covered up to Age 76     Colonoscopy Screen   Covered every 10 years- more Clients of institutions for the mentally retarded   Persons who live in the same house as a HepB virus carrier   Homosexual men   Illicit injectable drug abusers     Tetanus Toxoid- Only covered with a cut with metal- TD and TDaP Not covered by [facility]

## 2020-08-20 NOTE — ASSESSMENT & PLAN NOTE
Patient has been on allopurinol, tolerated it well.   Liver function tests and renal function tests have been normal.  No significant recurrences since starting the medication

## 2020-08-20 NOTE — ASSESSMENT & PLAN NOTE
Mild depressive disorder, has been better for the past 1 year. He has discontinued his Lexapro. Complained of drowsiness with the medication. No agitation, irritability or memory related issues at this time. Continue to monitor.

## 2020-08-25 ENCOUNTER — HOSPITAL ENCOUNTER (OUTPATIENT)
Dept: CT IMAGING | Age: 70
Discharge: HOME | End: 2020-08-25
Attending: INTERNAL MEDICINE

## 2020-08-25 DIAGNOSIS — Z13.6 SCREENING FOR CARDIOVASCULAR CONDITION: ICD-10-CM

## 2020-09-03 ENCOUNTER — TELEPHONE (OUTPATIENT)
Dept: INTERNAL MEDICINE CLINIC | Facility: CLINIC | Age: 70
End: 2020-09-03

## 2020-09-03 NOTE — TELEPHONE ENCOUNTER
Spouse following up for results of calcium scoring, has been awaiting response to [facility] for recommendations. Spouse expressed concern with results and requesting follow up from Dr Matthew Boone as soon as possible by today, please advise.     From: Mary Jane Alonso

## 2020-09-16 ENCOUNTER — OFFICE VISIT (OUTPATIENT)
Dept: OPHTHALMOLOGY | Facility: CLINIC | Age: 70
End: 2020-09-16
Payer: MEDICARE

## 2020-09-16 ENCOUNTER — TELEPHONE (OUTPATIENT)
Dept: INTERNAL MEDICINE CLINIC | Facility: CLINIC | Age: 70
End: 2020-09-16

## 2020-09-16 DIAGNOSIS — H40.003 GLAUCOMA SUSPECT OF BOTH EYES: Primary | ICD-10-CM

## 2020-09-16 DIAGNOSIS — H43.393 VITREOUS FLOATERS OF BOTH EYES: ICD-10-CM

## 2020-09-16 DIAGNOSIS — E11.9 DIABETES MELLITUS TYPE 2 WITHOUT RETINOPATHY (HCC): ICD-10-CM

## 2020-09-16 DIAGNOSIS — H25.13 AGE-RELATED NUCLEAR CATARACT OF BOTH EYES: ICD-10-CM

## 2020-09-16 PROBLEM — E11.65 TYPE 2 DIABETES MELLITUS WITH HYPERGLYCEMIA, WITHOUT LONG-TERM CURRENT USE OF INSULIN (CMD): Status: ACTIVE | Noted: 2020-09-16

## 2020-09-16 PROBLEM — I10 ESSENTIAL HYPERTENSION: Status: ACTIVE | Noted: 2020-09-16

## 2020-09-16 PROBLEM — D22.9 ATYPICAL NEVI: Status: ACTIVE | Noted: 2020-09-16

## 2020-09-16 PROBLEM — M10.9 GOUT: Status: ACTIVE | Noted: 2020-09-16

## 2020-09-16 PROBLEM — F32.A MILD DEPRESSION: Status: ACTIVE | Noted: 2020-09-16

## 2020-09-16 PROBLEM — E78.5 HYPERLIPIDEMIA: Status: ACTIVE | Noted: 2020-09-16

## 2020-09-16 PROBLEM — E55.9 VITAMIN D DEFICIENCY: Status: ACTIVE | Noted: 2020-09-16

## 2020-09-16 PROCEDURE — 92014 COMPRE OPH EXAM EST PT 1/>: CPT | Performed by: OPHTHALMOLOGY

## 2020-09-16 PROCEDURE — 92015 DETERMINE REFRACTIVE STATE: CPT | Performed by: OPHTHALMOLOGY

## 2020-09-16 PROCEDURE — 92250 FUNDUS PHOTOGRAPHY W/I&R: CPT | Performed by: OPHTHALMOLOGY

## 2020-09-16 RX ORDER — ALLOPURINOL 100 MG/1
100 TABLET ORAL DAILY
COMMUNITY
Start: 2020-08-20

## 2020-09-16 RX ORDER — ATORVASTATIN CALCIUM 10 MG/1
10 TABLET, FILM COATED ORAL DAILY
COMMUNITY
Start: 2020-08-20 | End: 2020-09-17 | Stop reason: SDUPTHER

## 2020-09-16 RX ORDER — GLIMEPIRIDE 1 MG/1
0.5 TABLET ORAL DAILY
COMMUNITY
Start: 2020-08-20

## 2020-09-16 RX ORDER — METFORMIN HYDROCHLORIDE 750 MG/1
750 TABLET, EXTENDED RELEASE ORAL 2 TIMES DAILY
COMMUNITY
Start: 2020-08-20

## 2020-09-16 RX ORDER — CHLORAL HYDRATE 500 MG
1000 CAPSULE ORAL DAILY
COMMUNITY

## 2020-09-16 NOTE — TELEPHONE ENCOUNTER
Spouse calling with patient, reports patient has appt tomorrow with Cardiologist Dr Librado Duque. They are requesting results from heart scan and labs be faxed over to them.      Tel# [phone]  Fax# pt does not know

## 2020-09-16 NOTE — PROGRESS NOTES
Rober Gitelman is a 69year old male.     HPI:     HPI     Consult      Additional comments: Per Dr. Mary Mota               Diabetic Eye Exam      Additional comments: Pt has been a diabetic for 20 years       Pt's diabetes is currently controlled by pills Oral Tab TAKE ONE TABLET BY MOUTH ONCE DAILY 90 tablet 1   • atorvastatin 10 MG Oral Tab TAKE 1 TABLET BY MOUTH ONCE DAILY 90 tablet 1   • glimepiride 1 MG Oral Tab Half a tablet daily with breakfast 90 tablet 1   • metFORMIN HCl  MG Oral Tablet 24 H gland dysfunction    Conjunctiva/Sclera Normal Normal    Cornea Clear Clear    Anterior Chamber Deep and quiet Deep and quiet    Iris Normal Normal    Lens 2+ Nuclear sclerosis 2+ Nuclear sclerosis    Vitreous Vitreous floaters Vitreous floaters          F Photos - OU - Both Eyes      Meds This Visit:  Requested Prescriptions      No prescriptions requested or ordered in this encounter        Follow up instructions:  Return for Next avail.  VF and OCT with no MD, If glaucoma diagnostics are wnl, then 1 yr DM

## 2020-09-17 ENCOUNTER — OFFICE VISIT (OUTPATIENT)
Dept: CARDIOLOGY | Age: 70
End: 2020-09-17

## 2020-09-17 DIAGNOSIS — R06.02 SOB (SHORTNESS OF BREATH): ICD-10-CM

## 2020-09-17 DIAGNOSIS — E11.65 TYPE 2 DIABETES MELLITUS WITH HYPERGLYCEMIA, WITHOUT LONG-TERM CURRENT USE OF INSULIN (CMD): ICD-10-CM

## 2020-09-17 DIAGNOSIS — I10 ESSENTIAL HYPERTENSION: Primary | ICD-10-CM

## 2020-09-17 DIAGNOSIS — E78.2 MIXED HYPERLIPIDEMIA: ICD-10-CM

## 2020-09-17 PROCEDURE — 93000 ELECTROCARDIOGRAM COMPLETE: CPT | Performed by: INTERNAL MEDICINE

## 2020-09-17 PROCEDURE — 99204 OFFICE O/P NEW MOD 45 MIN: CPT | Performed by: INTERNAL MEDICINE

## 2020-09-17 RX ORDER — ASPIRIN 81 MG/1
81 TABLET, CHEWABLE ORAL DAILY
COMMUNITY

## 2020-09-17 RX ORDER — ATORVASTATIN CALCIUM 20 MG/1
20 TABLET, FILM COATED ORAL DAILY
Qty: 90 TABLET | Refills: 3 | Status: SHIPPED | OUTPATIENT
Start: 2020-09-17 | End: 2020-12-08

## 2020-09-17 ASSESSMENT — PATIENT HEALTH QUESTIONNAIRE - PHQ9
CLINICAL INTERPRETATION OF PHQ9 SCORE: NO FURTHER SCREENING NEEDED
2. FEELING DOWN, DEPRESSED OR HOPELESS: NOT AT ALL
1. LITTLE INTEREST OR PLEASURE IN DOING THINGS: NOT AT ALL
SUM OF ALL RESPONSES TO PHQ9 QUESTIONS 1 AND 2: 0
SUM OF ALL RESPONSES TO PHQ9 QUESTIONS 1 AND 2: 0
CLINICAL INTERPRETATION OF PHQ2 SCORE: NO FURTHER SCREENING NEEDED

## 2020-09-22 ENCOUNTER — MED REC SCAN ONLY (OUTPATIENT)
Dept: INTERNAL MEDICINE CLINIC | Facility: CLINIC | Age: 70
End: 2020-09-22

## 2020-09-24 DIAGNOSIS — R06.02 SOB (SHORTNESS OF BREATH): ICD-10-CM

## 2020-09-25 ENCOUNTER — ANCILLARY PROCEDURE (OUTPATIENT)
Dept: CARDIOLOGY | Age: 70
End: 2020-09-25
Attending: INTERNAL MEDICINE

## 2020-09-25 DIAGNOSIS — R06.02 SOB (SHORTNESS OF BREATH): ICD-10-CM

## 2020-09-25 LAB
HEART RATE RESERVE PREDICTED: 14.09 BPM
LV EF: 55 %
PEAK HR ACHIEVED: 128 BPM
RESTING HR ACHIEVED: 68 BPM
STRESS BASELINE BP: NORMAL MMHG
STRESS PERCENT HR: 86 %
STRESS POST ESTIMATED WORKLOAD: 7 METS
STRESS POST EXERCISE DUR MIN: 7 MIN
STRESS POST EXERCISE DUR SEC: 30 SEC
STRESS POST PEAK BP: NORMAL MMHG
STRESS TARGET HR: 149 BPM

## 2020-09-25 PROCEDURE — 93018 CV STRESS TEST I&R ONLY: CPT | Performed by: INTERNAL MEDICINE

## 2020-09-25 PROCEDURE — 78452 HT MUSCLE IMAGE SPECT MULT: CPT | Performed by: INTERNAL MEDICINE

## 2020-09-25 PROCEDURE — 93016 CV STRESS TEST SUPVJ ONLY: CPT | Performed by: INTERNAL MEDICINE

## 2020-09-25 PROCEDURE — A9502 TC99M TETROFOSMIN: HCPCS | Performed by: INTERNAL MEDICINE

## 2020-09-25 PROCEDURE — 93017 CV STRESS TEST TRACING ONLY: CPT | Performed by: INTERNAL MEDICINE

## 2020-09-25 ASSESSMENT — EXERCISE STRESS TEST
PEAK_HR: 96
PEAK_BP: 130/66
COMMENTS: 2 MINUTE RECOVERY
PEAK_RPP: 22528
GRADE: 10
PEAK_RPP: 8450
PEAK_HR: 128
STAGE_CATEGORIES: RECOVERY 2
MPH: 2.5
STAGE_CATEGORIES: 2
STAGE_CATEGORIES: RECOVERY 1
COMMENTS: 30 SECOND RECOVERY
MPH: 2.9
PEAK_HR: 65
STAGE_CATEGORIES: RESTING
COMMENTS: 4 MINUTE RECOVERY
PEAK_HR: 128
PEAK_HR: 87
PEAK_BP: 120/70
STAGE_CATEGORIES: RECOVERY 0
STAGE_CATEGORIES: 1
PEAK_HR: 75
GRADE: 14
PEAK_BP: 122/70
STOPPAGE_REASON: GENERAL FATIGUE
PEAK_BP: 160/70
PEAK_RPP: 18860
PEAK_HR: 115
PEAK_RPP: 15360
PEAK_RPP: 9000
STAGE_CATEGORIES: 3
MPH: 1.7
GRADE: 12
PEAK_BP: 164/70
PEAK_BP: 176/70
PEAK_RPP: 10614

## 2020-09-28 ENCOUNTER — PATIENT MESSAGE (OUTPATIENT)
Dept: INTERNAL MEDICINE CLINIC | Facility: CLINIC | Age: 70
End: 2020-09-28

## 2020-09-29 NOTE — TELEPHONE ENCOUNTER
From: eSnait Mcknight  To: Linda Pearson MD  Sent: 9/28/2020 4:28 PM CDT  Subject: Other    Just to let you know that I had my flu shot today.  9-28-20'    Jtyesha Devoid

## 2020-09-30 NOTE — TELEPHONE ENCOUNTER
Spoke with [facility] in [facility] and confirmed patient received Fluzone high dose 9/28/2020--immunization updated to reflect.

## 2020-10-21 ENCOUNTER — NURSE ONLY (OUTPATIENT)
Dept: OPHTHALMOLOGY | Facility: CLINIC | Age: 70
End: 2020-10-21
Payer: MEDICARE

## 2020-10-21 DIAGNOSIS — H40.003 GLAUCOMA SUSPECT OF BOTH EYES: ICD-10-CM

## 2020-10-21 PROCEDURE — 92133 CPTRZD OPH DX IMG PST SGM ON: CPT | Performed by: OPHTHALMOLOGY

## 2020-10-21 PROCEDURE — 92083 EXTENDED VISUAL FIELD XM: CPT | Performed by: OPHTHALMOLOGY

## 2020-10-21 NOTE — PROGRESS NOTES
Yuri Mishra is a 69year old male.     HPI:     HPI     Here for a VF and OCT no MD.    Last edited by Nicole Duncan O.T. on 10/21/2020  7:59 AM. (History)        Patient History:  Past Medical History:   Diagnosis Date   • Amblyopia      Right Eye ONE STRIP TO CHECK GLUCOSE TWICE DAILY 100 strip 3   • omega-3 fatty acids 1000 MG Oral Cap Take 1,000 mg by mouth daily. • aspirin (ASPIRIN ADULT LOW STRENGTH) 81 MG Oral Tab EC Take  by mouth.  take 1 tablet (81MG)  by oral route  every day     • Chol

## 2020-11-19 ENCOUNTER — LAB ENCOUNTER (OUTPATIENT)
Dept: LAB | Facility: HOSPITAL | Age: 70
End: 2020-11-19
Attending: INTERNAL MEDICINE
Payer: MEDICARE

## 2020-11-19 DIAGNOSIS — Z11.59 NEED FOR HEPATITIS C SCREENING TEST: ICD-10-CM

## 2020-11-19 DIAGNOSIS — E11.9 TYPE 2 DIABETES MELLITUS WITHOUT COMPLICATION, WITHOUT LONG-TERM CURRENT USE OF INSULIN (HCC): ICD-10-CM

## 2020-11-19 PROCEDURE — 83036 HEMOGLOBIN GLYCOSYLATED A1C: CPT

## 2020-11-19 PROCEDURE — 85025 COMPLETE CBC W/AUTO DIFF WBC: CPT

## 2020-11-19 PROCEDURE — 36415 COLL VENOUS BLD VENIPUNCTURE: CPT

## 2020-11-19 PROCEDURE — 80053 COMPREHEN METABOLIC PANEL: CPT

## 2020-11-19 PROCEDURE — 82570 ASSAY OF URINE CREATININE: CPT

## 2020-11-19 PROCEDURE — 81001 URINALYSIS AUTO W/SCOPE: CPT

## 2020-11-19 PROCEDURE — 86803 HEPATITIS C AB TEST: CPT

## 2020-11-19 PROCEDURE — 80061 LIPID PANEL: CPT

## 2020-11-19 PROCEDURE — 82043 UR ALBUMIN QUANTITATIVE: CPT

## 2020-12-02 ENCOUNTER — OFFICE VISIT (OUTPATIENT)
Dept: INTERNAL MEDICINE CLINIC | Facility: CLINIC | Age: 70
End: 2020-12-02
Payer: MEDICARE

## 2020-12-02 VITALS
SYSTOLIC BLOOD PRESSURE: 113 MMHG | HEIGHT: 71 IN | RESPIRATION RATE: 24 BRPM | HEART RATE: 80 BPM | BODY MASS INDEX: 30.99 KG/M2 | DIASTOLIC BLOOD PRESSURE: 66 MMHG | OXYGEN SATURATION: 96 % | WEIGHT: 221.38 LBS | TEMPERATURE: 98 F

## 2020-12-02 DIAGNOSIS — R93.1 ELEVATED CORONARY ARTERY CALCIUM SCORE: ICD-10-CM

## 2020-12-02 DIAGNOSIS — I10 ESSENTIAL HYPERTENSION: ICD-10-CM

## 2020-12-02 DIAGNOSIS — E78.5 HYPERLIPIDEMIA, UNSPECIFIED HYPERLIPIDEMIA TYPE: ICD-10-CM

## 2020-12-02 DIAGNOSIS — E11.65 TYPE 2 DIABETES MELLITUS WITH HYPERGLYCEMIA, WITHOUT LONG-TERM CURRENT USE OF INSULIN (HCC): Primary | ICD-10-CM

## 2020-12-02 DIAGNOSIS — E55.9 VITAMIN D DEFICIENCY: ICD-10-CM

## 2020-12-02 PROCEDURE — 99214 OFFICE O/P EST MOD 30 MIN: CPT | Performed by: INTERNAL MEDICINE

## 2020-12-02 PROCEDURE — G0463 HOSPITAL OUTPT CLINIC VISIT: HCPCS | Performed by: INTERNAL MEDICINE

## 2020-12-02 RX ORDER — ROSUVASTATIN CALCIUM 10 MG/1
10 TABLET, COATED ORAL NIGHTLY
Qty: 30 TABLET | Refills: 2 | Status: SHIPPED | OUTPATIENT
Start: 2020-12-02 | End: 2021-02-24

## 2020-12-02 NOTE — ASSESSMENT & PLAN NOTE
CT calcium scoring heart scan–elevated calcium scores but normal Lexiscan stress test completed at [facility] heart. Was only able to walk for about 7 minutes. Total 7.7 METS. Developed significant fatigue at that time and had to stop.   Consider a [employer]

## 2020-12-02 NOTE — PATIENT INSTRUCTIONS
Problem List Items Addressed This Visit        Unprioritized    Elevated coronary artery calcium score     CT calcium scoring heart scan–elevated calcium scores but normal Lexiscan stress test completed at [facility] heart.   Was only able to walk for about 7 stable. Blood pressures are stable. At this time too low to start on an ACE inhibitor due to risk of hypotension. Blood pressure 113/66, pulse 80, temperature 97.8 °F (36.6 °C), temperature source Tympanic, resp.  rate 24, height 5' 11\" (1.803 m), weigh

## 2020-12-02 NOTE — ASSESSMENT & PLAN NOTE
Blood pressure 113/66, pulse 80, temperature 97.8 °F (36.6 °C), temperature source Tympanic, resp. rate 24, height 5' 11\" (1.803 m), weight 221 lb 6.4 oz (100.4 kg), SpO2 96 %. Blood pressures look stable. Well controlled at this time.   Unable to tereso

## 2020-12-02 NOTE — ASSESSMENT & PLAN NOTE
Lipid panel and liver function test look stable on atorvastatin at 10 mg daily. However the doses of atorvastatin were increased to 20 mg daily due to elevated calcium scores.   He was unable to tolerate the dose due to myalgia, dose is reduced with the pa

## 2020-12-02 NOTE — PROGRESS NOTES
HPI:    Patient ID: Shavonne Hammer is a 69year old male.     Hypertension (11/19/20 eGFR 82 to 62, BUN 16 to 21, creatinine 0.94 to 1.17)  Hyperlipidemia (08/25 CT Ca Scoring - 1747; 11/19/20 LDL 51 to 39, triglycerides 79 to 85, total 106 to 103)  Diabet lifestyle. Diabetes  He presents for his follow-up diabetic visit. He has type 2 diabetes mellitus. No MedicAlert identification noted. His disease course has been improving. There are no hypoglycemic associated symptoms.  Associated symptoms include fati comments: Ct calcium scoring heart scan    FINDINGS:             REGION            CALCIUM SCORE     LEFT MAIN:       0  LEFT ANTERIOR DESCENDING:           [place]:  505  RIGHT CORONARY ARTERY:   667     TOTAL CALCIUM SCORE:         1747        Fi Negative. Negative for chest pain and PND. Gastrointestinal: Negative. Genitourinary: Negative. Musculoskeletal: Positive for arthralgias, joint pain and myalgias (with increased dose of statins). Skin: Negative. Neurological: Negative.     Ps rate, regular rhythm, normal heart sounds and intact distal pulses. Pulmonary/Chest: Effort normal and breath sounds normal.   Abdominal: Soft. Bowel sounds are normal.   Musculoskeletal: Normal range of motion.    Lymphadenopathy:     He has no cervical 13) 10/28/2016   • Pneumovax 23 06/02/2015, 08/20/2020   • TDAP 06/02/2015              Relevant Orders    COMP METABOLIC PANEL (14)    LIPID PANEL    Essential hypertension     Blood pressure 113/66, pulse 80, temperature 97.8 °F (36.6 °C), temperature so Panel [E]      Meds This Visit:  Requested Prescriptions     Signed Prescriptions Disp Refills   • Rosuvastatin Calcium 10 MG Oral Tab 30 tablet 2     Sig: Take 1 tablet (10 mg total) by mouth nightly.        Imaging & Referrals:  None       OJ#[id number]

## 2020-12-02 NOTE — ASSESSMENT & PLAN NOTE
Blood sugars remain well controlled. Hemoglobin A1c at 6.2. No low sugar reactions. Currently on Metformin 750 mg twice daily and glimepiride 1 mg half a tablet daily. Kidney functions look stable. Blood pressures are stable.   At this time too low to

## 2020-12-03 RX ORDER — ROSUVASTATIN CALCIUM 10 MG/1
10 TABLET, COATED ORAL
COMMUNITY
Start: 2020-12-02

## 2020-12-08 ENCOUNTER — OFFICE VISIT (OUTPATIENT)
Dept: CARDIOLOGY | Age: 70
End: 2020-12-08

## 2020-12-08 VITALS
WEIGHT: 218 LBS | HEIGHT: 72 IN | BODY MASS INDEX: 29.53 KG/M2 | HEART RATE: 90 BPM | SYSTOLIC BLOOD PRESSURE: 128 MMHG | OXYGEN SATURATION: 98 % | DIASTOLIC BLOOD PRESSURE: 70 MMHG

## 2020-12-08 DIAGNOSIS — E11.65 TYPE 2 DIABETES MELLITUS WITH HYPERGLYCEMIA, WITHOUT LONG-TERM CURRENT USE OF INSULIN (CMD): ICD-10-CM

## 2020-12-08 DIAGNOSIS — I10 ESSENTIAL HYPERTENSION: Primary | ICD-10-CM

## 2020-12-08 DIAGNOSIS — E78.2 MIXED HYPERLIPIDEMIA: ICD-10-CM

## 2020-12-08 PROCEDURE — 99214 OFFICE O/P EST MOD 30 MIN: CPT | Performed by: INTERNAL MEDICINE

## 2020-12-08 ASSESSMENT — PATIENT HEALTH QUESTIONNAIRE - PHQ9
1. LITTLE INTEREST OR PLEASURE IN DOING THINGS: NOT AT ALL
CLINICAL INTERPRETATION OF PHQ9 SCORE: NO FURTHER SCREENING NEEDED
CLINICAL INTERPRETATION OF PHQ2 SCORE: NO FURTHER SCREENING NEEDED
SUM OF ALL RESPONSES TO PHQ9 QUESTIONS 1 AND 2: 0
2. FEELING DOWN, DEPRESSED OR HOPELESS: NOT AT ALL
SUM OF ALL RESPONSES TO PHQ9 QUESTIONS 1 AND 2: 0

## 2020-12-10 ENCOUNTER — MED REC SCAN ONLY (OUTPATIENT)
Dept: INTERNAL MEDICINE CLINIC | Facility: CLINIC | Age: 70
End: 2020-12-10

## 2020-12-11 ENCOUNTER — TELEPHONE (OUTPATIENT)
Dept: CARDIOLOGY | Age: 70
End: 2020-12-11

## 2020-12-17 ENCOUNTER — APPOINTMENT (OUTPATIENT)
Dept: CARDIOLOGY | Age: 70
End: 2020-12-17

## 2021-02-01 DIAGNOSIS — Z23 NEED FOR VACCINATION: ICD-10-CM

## 2021-02-24 ENCOUNTER — LAB ENCOUNTER (OUTPATIENT)
Dept: LAB | Facility: HOSPITAL | Age: 71
End: 2021-02-24
Attending: INTERNAL MEDICINE
Payer: MEDICARE

## 2021-02-24 DIAGNOSIS — E11.65 TYPE 2 DIABETES MELLITUS WITH HYPERGLYCEMIA, WITHOUT LONG-TERM CURRENT USE OF INSULIN (HCC): ICD-10-CM

## 2021-02-24 DIAGNOSIS — E78.5 HYPERLIPIDEMIA, UNSPECIFIED HYPERLIPIDEMIA TYPE: ICD-10-CM

## 2021-02-24 LAB
ALBUMIN SERPL-MCNC: 3.5 G/DL (ref 3.4–5)
ALBUMIN/GLOB SERPL: 0.9 {RATIO} (ref 1–2)
ALP LIVER SERPL-CCNC: 69 U/L
ALT SERPL-CCNC: 22 U/L
ANION GAP SERPL CALC-SCNC: 4 MMOL/L (ref 0–18)
AST SERPL-CCNC: 16 U/L (ref 15–37)
BILIRUB SERPL-MCNC: 0.4 MG/DL (ref 0.1–2)
BUN BLD-MCNC: 24 MG/DL (ref 7–18)
BUN/CREAT SERPL: 24.5 (ref 10–20)
CALCIUM BLD-MCNC: 9.4 MG/DL (ref 8.5–10.1)
CHLORIDE SERPL-SCNC: 105 MMOL/L (ref 98–112)
CHOLEST SMN-MCNC: 101 MG/DL (ref ?–200)
CO2 SERPL-SCNC: 30 MMOL/L (ref 21–32)
CREAT BLD-MCNC: 0.98 MG/DL
GLOBULIN PLAS-MCNC: 4 G/DL (ref 2.8–4.4)
GLUCOSE BLD-MCNC: 118 MG/DL (ref 70–99)
HDLC SERPL-MCNC: 43 MG/DL (ref 40–59)
LDLC SERPL CALC-MCNC: 43 MG/DL (ref ?–100)
M PROTEIN MFR SERPL ELPH: 7.5 G/DL (ref 6.4–8.2)
NONHDLC SERPL-MCNC: 58 MG/DL (ref ?–130)
OSMOLALITY SERPL CALC.SUM OF ELEC: 293 MOSM/KG (ref 275–295)
PATIENT FASTING Y/N/NP: YES
PATIENT FASTING Y/N/NP: YES
POTASSIUM SERPL-SCNC: 4.5 MMOL/L (ref 3.5–5.1)
SODIUM SERPL-SCNC: 139 MMOL/L (ref 136–145)
TRIGL SERPL-MCNC: 77 MG/DL (ref 30–149)
VLDLC SERPL CALC-MCNC: 15 MG/DL (ref 0–30)

## 2021-02-24 PROCEDURE — 80061 LIPID PANEL: CPT

## 2021-02-24 PROCEDURE — 80053 COMPREHEN METABOLIC PANEL: CPT

## 2021-02-24 PROCEDURE — 36415 COLL VENOUS BLD VENIPUNCTURE: CPT

## 2021-02-26 RX ORDER — ROSUVASTATIN CALCIUM 10 MG/1
10 TABLET, COATED ORAL NIGHTLY
Qty: 30 TABLET | Refills: 2 | OUTPATIENT
Start: 2021-02-26

## 2021-02-26 RX ORDER — ROSUVASTATIN CALCIUM 10 MG/1
10 TABLET, COATED ORAL NIGHTLY
Qty: 90 TABLET | Refills: 1 | Status: SHIPPED | OUTPATIENT
Start: 2021-02-26 | End: 2021-08-24

## 2021-03-15 ENCOUNTER — OFFICE VISIT (OUTPATIENT)
Dept: INTERNAL MEDICINE CLINIC | Facility: CLINIC | Age: 71
End: 2021-03-15
Payer: MEDICARE

## 2021-03-15 VITALS
DIASTOLIC BLOOD PRESSURE: 76 MMHG | HEIGHT: 71 IN | SYSTOLIC BLOOD PRESSURE: 126 MMHG | BODY MASS INDEX: 31.36 KG/M2 | HEART RATE: 82 BPM | WEIGHT: 224 LBS | TEMPERATURE: 98 F | RESPIRATION RATE: 16 BRPM

## 2021-03-15 DIAGNOSIS — E78.5 HYPERLIPIDEMIA, UNSPECIFIED HYPERLIPIDEMIA TYPE: ICD-10-CM

## 2021-03-15 DIAGNOSIS — I10 ESSENTIAL HYPERTENSION: ICD-10-CM

## 2021-03-15 DIAGNOSIS — E11.9 TYPE 2 DIABETES MELLITUS WITHOUT COMPLICATION, WITHOUT LONG-TERM CURRENT USE OF INSULIN (HCC): ICD-10-CM

## 2021-03-15 DIAGNOSIS — Z12.5 PROSTATE CANCER SCREENING: ICD-10-CM

## 2021-03-15 DIAGNOSIS — E11.65 TYPE 2 DIABETES MELLITUS WITH HYPERGLYCEMIA, WITHOUT LONG-TERM CURRENT USE OF INSULIN (HCC): Primary | ICD-10-CM

## 2021-03-15 PROCEDURE — 99214 OFFICE O/P EST MOD 30 MIN: CPT | Performed by: INTERNAL MEDICINE

## 2021-03-15 RX ORDER — RAMIPRIL 1.25 MG/1
1.25 CAPSULE ORAL DAILY
Qty: 90 CAPSULE | Refills: 1 | Status: SHIPPED | OUTPATIENT
Start: 2021-03-15 | End: 2021-09-22

## 2021-03-15 RX ORDER — METFORMIN HYDROCHLORIDE 750 MG/1
750 TABLET, EXTENDED RELEASE ORAL 2 TIMES DAILY WITH MEALS
Qty: 180 TABLET | Refills: 1 | Status: SHIPPED | OUTPATIENT
Start: 2021-03-15 | End: 2021-09-22

## 2021-03-15 RX ORDER — ALLOPURINOL 100 MG/1
TABLET ORAL
Qty: 90 TABLET | Refills: 1 | Status: SHIPPED | OUTPATIENT
Start: 2021-03-15 | End: 2021-05-23

## 2021-03-15 NOTE — PATIENT INSTRUCTIONS
Problem List Items Addressed This Visit        Unprioritized    Essential hypertension     Blood pressure 126/76, pulse 82, temperature 98.2 °F (36.8 °C), temperature source Tympanic, resp. rate 16, height 5' 11\" (1.803 m), weight 224 lb (101.6 kg).      P MICROALB/CREAT RATIO, RANDOM URINE    URINALYSIS, ROUTINE    TSH W REFLEX TO FREE T4      Other Visit Diagnoses     Type 2 diabetes mellitus without complication, without long-term current use of insulin (HCC)        Relevant Medications    metFORMIN HCl E

## 2021-03-15 NOTE — ASSESSMENT & PLAN NOTE
Lipid panel and liver function test look stable on rosuvastatin at 10 mg daily. He has tolerated the medications well.   Recheck labs, follow-up in 6 months

## 2021-03-15 NOTE — ASSESSMENT & PLAN NOTE
Blood pressure 126/76, pulse 82, temperature 98.2 °F (36.8 °C), temperature source Tympanic, resp. rate 16, height 5' 11\" (1.803 m), weight 224 lb (101.6 kg). Pressures are stable, his blood pressures have been typically low.   We will consider startin

## 2021-03-15 NOTE — PROGRESS NOTES
HPI:    Patient ID: Rich Khanna is a 69year old male. The cholesterol panel looks normal.  The kidney functions, liver functions, electrolytes and calcium levels look normal.    Hba1c 6.2 in 11/2020    Hyperlipidemia  This is a chronic problem.  The artery disease include diabetes mellitus, dyslipidemia, sedentary lifestyle and male sex. Current diabetic treatment includes diet and oral agent (dual therapy) (metformin 750 mgs po bid,glimepiride 1 mgs 1/2 a tab daily).  He is compliant with treatment al Glucosamine-Chondroitin (GLUCOSAMINE CHONDR COMPLEX OR) Take by mouth. • Glucose Blood (VINNIE CONTOUR TEST) In Vitro Strip USE ONE STRIP TO CHECK GLUCOSE TWICE DAILY 100 strip 3   • omega-3 fatty acids 1000 MG Oral Cap Take 1,000 mg by mouth daily. hyperglycemia, without long-term current use of insulin ([facility]) - Primary     Blood sugars are very well controlled, hemoglobin A1c was at 6.2. He has not had any low sugar reactions.   Currently on Metformin 750 mg twice daily, glimepiride 1 mg half a table Diagnoses     Type 2 diabetes mellitus without complication, without long-term current use of insulin (HCC)        Relevant Medications    metFORMIN HCl  MG Oral Tablet 24 Hr    Prostate cancer screening        Relevant Orders    PSA SCREEN

## 2021-03-15 NOTE — ASSESSMENT & PLAN NOTE
Blood sugars are very well controlled, hemoglobin A1c was at 6.2. He has not had any low sugar reactions. Currently on Metformin 750 mg twice daily, glimepiride 1 mg half a tablet daily. He has tolerated his medications well.   Kidney functions look stab

## 2021-05-23 RX ORDER — ALLOPURINOL 100 MG/1
TABLET ORAL
Qty: 90 TABLET | Refills: 1 | Status: SHIPPED | OUTPATIENT
Start: 2021-05-23 | End: 2021-09-22

## 2021-06-02 NOTE — ASSESSMENT & PLAN NOTE
Elevated potassium–quite high at this time. Most likely related to the antibiotics–either Bactrim DS on the cephalosporin. Advised to discontinue all antibiotics as of foot discomfort and redness has resolved. Drink plenty of fluids to keep hydrated.   Genette Loveless No

## 2021-08-24 RX ORDER — ROSUVASTATIN CALCIUM 10 MG/1
10 TABLET, COATED ORAL NIGHTLY
Qty: 90 TABLET | Refills: 1 | Status: SHIPPED | OUTPATIENT
Start: 2021-08-24 | End: 2021-09-22

## 2021-08-25 NOTE — TELEPHONE ENCOUNTER
Refill passed per [facility] protocol. Requested Prescriptions   Pending Prescriptions Disp Refills    ROSUVASTATIN 10 MG Oral Tab [Pharmacy Med Name: Rosuvastatin Calcium 10 MG Oral Tablet] 90 tablet 0     Sig: Take 1 tablet by mouth nightly        Cholesterol Medication Protocol Passed - 8/24/2021  5:26 PM        Passed - ALT in past 12 months        Passed - LDL in past 12 months        Passed - Last ALT < 80       Lab Results   Component Value Date    ALT 22 02/24/2021             Passed - Last LDL < 130     Lab Results   Component Value Date    LDL 43 02/24/2021             Passed - Appointment in past 12 or next 3 months            Future Appointments         Provider Department Appt Notes    In 4 weeks Dara Hamilton MD [facility], [address], [place] [facility] follow up.     In 1 month Kulwinder Grant MD [facility] EP/ 1 yr DM EE          Recent Outpatient Visits              5 months ago Type 2 diabetes mellitus with hyperglycemia, without long-term current use of insulin [facility])    [facility], [address], Dasha Blanco MD    Office Visit    8 months ago Type 2 diabetes mellitus with hyperglycemia, without long-term current use of insulin [facility])    [place] [employer] [place], [address], Dasha Blanco MD    Office Visit    10 months ago Glaucoma suspect of both eyes    [facility] Ophthalmology    Nurse Only    11 months ago Glaucoma suspect of both eyes    [facility]hthal[facility] Kulwinder Grant MD    Office Visit    1 year ago Medicare annual wellness visit, subsequent    Whitney Felder, Dasha Blanco MD    Office Visit

## 2021-09-08 ENCOUNTER — LAB ENCOUNTER (OUTPATIENT)
Dept: LAB | Facility: HOSPITAL | Age: 71
End: 2021-09-08
Attending: INTERNAL MEDICINE
Payer: MEDICARE

## 2021-09-08 DIAGNOSIS — E11.65 TYPE 2 DIABETES MELLITUS WITH HYPERGLYCEMIA, WITHOUT LONG-TERM CURRENT USE OF INSULIN (HCC): ICD-10-CM

## 2021-09-08 DIAGNOSIS — Z12.5 PROSTATE CANCER SCREENING: ICD-10-CM

## 2021-09-08 LAB
ALBUMIN SERPL-MCNC: 3.7 G/DL (ref 3.4–5)
ALBUMIN/GLOB SERPL: 1 {RATIO} (ref 1–2)
ALP LIVER SERPL-CCNC: 67 U/L
ALT SERPL-CCNC: 22 U/L
ANION GAP SERPL CALC-SCNC: 6 MMOL/L (ref 0–18)
AST SERPL-CCNC: 18 U/L (ref 15–37)
BASOPHILS # BLD AUTO: 0.06 X10(3) UL (ref 0–0.2)
BASOPHILS NFR BLD AUTO: 0.6 %
BILIRUB SERPL-MCNC: 0.5 MG/DL (ref 0.1–2)
BILIRUB UR QL: NEGATIVE
BUN BLD-MCNC: 19 MG/DL (ref 7–18)
BUN/CREAT SERPL: 20.7 (ref 10–20)
CALCIUM BLD-MCNC: 9.3 MG/DL (ref 8.5–10.1)
CHLORIDE SERPL-SCNC: 105 MMOL/L (ref 98–112)
CHOLEST SMN-MCNC: 102 MG/DL (ref ?–200)
CLARITY UR: CLEAR
CO2 SERPL-SCNC: 29 MMOL/L (ref 21–32)
COLOR UR: YELLOW
COMPLEXED PSA SERPL-MCNC: 2.54 NG/ML (ref ?–4)
CREAT BLD-MCNC: 0.92 MG/DL
CREAT UR-SCNC: 68.8 MG/DL
DEPRECATED RDW RBC AUTO: 41.9 FL (ref 35.1–46.3)
EOSINOPHIL # BLD AUTO: 0.2 X10(3) UL (ref 0–0.7)
EOSINOPHIL NFR BLD AUTO: 2.2 %
ERYTHROCYTE [DISTWIDTH] IN BLOOD BY AUTOMATED COUNT: 11.9 % (ref 11–15)
EST. AVERAGE GLUCOSE BLD GHB EST-MCNC: 140 MG/DL (ref 68–126)
GLOBULIN PLAS-MCNC: 3.7 G/DL (ref 2.8–4.4)
GLUCOSE BLD-MCNC: 110 MG/DL (ref 70–99)
GLUCOSE UR-MCNC: NEGATIVE MG/DL
HBA1C MFR BLD HPLC: 6.5 % (ref ?–5.7)
HCT VFR BLD AUTO: 44.6 %
HDLC SERPL-MCNC: 41 MG/DL (ref 40–59)
HGB BLD-MCNC: 15 G/DL
IMM GRANULOCYTES # BLD AUTO: 0.04 X10(3) UL (ref 0–1)
IMM GRANULOCYTES NFR BLD: 0.4 %
KETONES UR-MCNC: NEGATIVE MG/DL
LDLC SERPL CALC-MCNC: 45 MG/DL (ref ?–100)
LEUKOCYTE ESTERASE UR QL STRIP.AUTO: NEGATIVE
LYMPHOCYTES # BLD AUTO: 3.1 X10(3) UL (ref 1–4)
LYMPHOCYTES NFR BLD AUTO: 33.4 %
M PROTEIN MFR SERPL ELPH: 7.4 G/DL (ref 6.4–8.2)
MCH RBC QN AUTO: 31.6 PG (ref 26–34)
MCHC RBC AUTO-ENTMCNC: 33.6 G/DL (ref 31–37)
MCV RBC AUTO: 94.1 FL
MICROALBUMIN UR-MCNC: <0.5 MG/DL
MONOCYTES # BLD AUTO: 0.79 X10(3) UL (ref 0.1–1)
MONOCYTES NFR BLD AUTO: 8.5 %
NEUTROPHILS # BLD AUTO: 5.08 X10 (3) UL (ref 1.5–7.7)
NEUTROPHILS # BLD AUTO: 5.08 X10(3) UL (ref 1.5–7.7)
NEUTROPHILS NFR BLD AUTO: 54.9 %
NITRITE UR QL STRIP.AUTO: NEGATIVE
NONHDLC SERPL-MCNC: 61 MG/DL (ref ?–130)
OSMOLALITY SERPL CALC.SUM OF ELEC: 293 MOSM/KG (ref 275–295)
PATIENT FASTING Y/N/NP: YES
PATIENT FASTING Y/N/NP: YES
PH UR: 7 [PH] (ref 5–8)
PLATELET # BLD AUTO: 249 10(3)UL (ref 150–450)
POTASSIUM SERPL-SCNC: 4.9 MMOL/L (ref 3.5–5.1)
PROT UR-MCNC: NEGATIVE MG/DL
RBC # BLD AUTO: 4.74 X10(6)UL
SODIUM SERPL-SCNC: 140 MMOL/L (ref 136–145)
SP GR UR STRIP: 1.01 (ref 1–1.03)
TRIGL SERPL-MCNC: 80 MG/DL (ref 30–149)
TSI SER-ACNC: 1.72 MIU/ML (ref 0.36–3.74)
UROBILINOGEN UR STRIP-ACNC: <2
VLDLC SERPL CALC-MCNC: 11 MG/DL (ref 0–30)
WBC # BLD AUTO: 9.3 X10(3) UL (ref 4–11)

## 2021-09-08 PROCEDURE — 36415 COLL VENOUS BLD VENIPUNCTURE: CPT

## 2021-09-08 PROCEDURE — 80053 COMPREHEN METABOLIC PANEL: CPT

## 2021-09-08 PROCEDURE — 82043 UR ALBUMIN QUANTITATIVE: CPT

## 2021-09-08 PROCEDURE — 83036 HEMOGLOBIN GLYCOSYLATED A1C: CPT

## 2021-09-08 PROCEDURE — 82570 ASSAY OF URINE CREATININE: CPT

## 2021-09-08 PROCEDURE — 81001 URINALYSIS AUTO W/SCOPE: CPT

## 2021-09-08 PROCEDURE — 80061 LIPID PANEL: CPT

## 2021-09-08 PROCEDURE — 84443 ASSAY THYROID STIM HORMONE: CPT

## 2021-09-08 PROCEDURE — 85025 COMPLETE CBC W/AUTO DIFF WBC: CPT

## 2021-09-22 ENCOUNTER — OFFICE VISIT (OUTPATIENT)
Dept: INTERNAL MEDICINE CLINIC | Facility: CLINIC | Age: 71
End: 2021-09-22
Payer: MEDICARE

## 2021-09-22 VITALS
RESPIRATION RATE: 18 BRPM | DIASTOLIC BLOOD PRESSURE: 76 MMHG | HEART RATE: 77 BPM | HEIGHT: 71 IN | SYSTOLIC BLOOD PRESSURE: 123 MMHG | WEIGHT: 218 LBS | BODY MASS INDEX: 30.52 KG/M2 | TEMPERATURE: 97 F

## 2021-09-22 DIAGNOSIS — E55.9 VITAMIN D DEFICIENCY: ICD-10-CM

## 2021-09-22 DIAGNOSIS — I10 ESSENTIAL HYPERTENSION: ICD-10-CM

## 2021-09-22 DIAGNOSIS — R09.89 BRUIT OF RIGHT CAROTID ARTERY: ICD-10-CM

## 2021-09-22 DIAGNOSIS — H25.13 AGE-RELATED NUCLEAR CATARACT OF BOTH EYES: ICD-10-CM

## 2021-09-22 DIAGNOSIS — Z00.00 MEDICARE ANNUAL WELLNESS VISIT, SUBSEQUENT: Primary | ICD-10-CM

## 2021-09-22 DIAGNOSIS — M10.9 GOUT, UNSPECIFIED CAUSE, UNSPECIFIED CHRONICITY, UNSPECIFIED SITE: ICD-10-CM

## 2021-09-22 DIAGNOSIS — E11.65 TYPE 2 DIABETES MELLITUS WITH HYPERGLYCEMIA, WITHOUT LONG-TERM CURRENT USE OF INSULIN (HCC): ICD-10-CM

## 2021-09-22 DIAGNOSIS — Z13.31 DEPRESSION SCREENING: ICD-10-CM

## 2021-09-22 DIAGNOSIS — H40.003 GLAUCOMA SUSPECT OF BOTH EYES: ICD-10-CM

## 2021-09-22 DIAGNOSIS — Z00.00 ENCOUNTER FOR ANNUAL HEALTH EXAMINATION: ICD-10-CM

## 2021-09-22 DIAGNOSIS — E78.5 HYPERLIPIDEMIA, UNSPECIFIED HYPERLIPIDEMIA TYPE: ICD-10-CM

## 2021-09-22 DIAGNOSIS — R93.1 ELEVATED CORONARY ARTERY CALCIUM SCORE: ICD-10-CM

## 2021-09-22 DIAGNOSIS — D22.9 ATYPICAL NEVI: ICD-10-CM

## 2021-09-22 DIAGNOSIS — E11.9 TYPE 2 DIABETES MELLITUS WITHOUT COMPLICATION, WITHOUT LONG-TERM CURRENT USE OF INSULIN (HCC): ICD-10-CM

## 2021-09-22 DIAGNOSIS — F32.A MILD DEPRESSION: ICD-10-CM

## 2021-09-22 PROCEDURE — G0444 DEPRESSION SCREEN ANNUAL: HCPCS | Performed by: INTERNAL MEDICINE

## 2021-09-22 PROCEDURE — G0439 PPPS, SUBSEQ VISIT: HCPCS | Performed by: INTERNAL MEDICINE

## 2021-09-22 RX ORDER — METFORMIN HYDROCHLORIDE 750 MG/1
750 TABLET, EXTENDED RELEASE ORAL 2 TIMES DAILY WITH MEALS
Qty: 180 TABLET | Refills: 2 | Status: SHIPPED | OUTPATIENT
Start: 2021-09-22

## 2021-09-22 RX ORDER — GLIMEPIRIDE 1 MG/1
TABLET ORAL
Qty: 90 TABLET | Refills: 2 | Status: SHIPPED | OUTPATIENT
Start: 2021-09-22

## 2021-09-22 RX ORDER — ALLOPURINOL 100 MG/1
TABLET ORAL
Qty: 90 TABLET | Refills: 2 | Status: SHIPPED | OUTPATIENT
Start: 2021-09-22

## 2021-09-22 RX ORDER — ROSUVASTATIN CALCIUM 10 MG/1
10 TABLET, COATED ORAL NIGHTLY
Qty: 90 TABLET | Refills: 2 | Status: SHIPPED | OUTPATIENT
Start: 2021-09-22

## 2021-09-22 RX ORDER — RAMIPRIL 1.25 MG/1
1.25 CAPSULE ORAL DAILY
Qty: 90 CAPSULE | Refills: 2 | Status: SHIPPED | OUTPATIENT
Start: 2021-09-22

## 2021-09-22 NOTE — ASSESSMENT & PLAN NOTE
Persistent right carotid bruit. Last carotid Doppler completed in 2017. His CT calcium scoring study done recently did show elevated calcium scores. Repeat carotid study ordered. Advised to have this completed in the next 6 months.

## 2021-09-22 NOTE — ASSESSMENT & PLAN NOTE
Blood sugars seem well controlled, hemoglobin A1c is at 6.5 at this time. He has not had significant low sugar reactions. Current medications–Metformin 750 mg twice daily, glimepiride 1 mg half a tab daily.   Labs show normal kidney functions without prot

## 2021-09-22 NOTE — PROGRESS NOTES
HPI:   Shavonne Hammer is a 66year old male who presents for a .     His last annual assessment has been over 1 year: Annual Physical due on 09/22/2022       Urine test including urine protein levels look normal.  Cholesterol panel normal.  Total white b tobacco.  Mr. Kevin Wang already takes aspirin and has it on his medication list.   CAGE screening score of 0 on 9/21/2021, showing low risk of alcohol abuse.          Patient Care Team: Patient Care Team:  Emi Villanueva MD as PCP - General (Internal Medicine nightly. Glucosamine-Chondroitin (GLUCOSAMINE CHONDR COMPLEX OR), Take by mouth. Glucose Blood (VINNIE CONTOUR TEST) In Vitro Strip, USE ONE STRIP TO CHECK GLUCOSE TWICE DAILY  omega-3 fatty acids 1000 MG Oral Cap, Take 1,000 mg by mouth daily.   aspirin 8 asthma  EXAM:   /76   Pulse 77   Temp 96.6 °F (35.9 °C) (Temporal)   Resp 18   Ht 5' 11\" (1.803 m)   Wt 218 lb (98.9 kg)   BMI 30.40 kg/m²   Estimated body mass index is 30.4 kg/m² as calculated from the following:    Height as of this encounter: 5' Ear: Tympanic membrane, ear canal and external ear normal.      Left Ear: Tympanic membrane, ear canal and external ear normal.      Nose: Nose normal.      Mouth/Throat:      Pharynx: No oropharyngeal exudate.    Eyes:      Conjunctiva/sclera: Conjunctivae Deep Tendon Reflexes: Reflexes are normal and symmetric. Psychiatric:         Mood and Affect: Mood normal.         Behavior: Behavior normal.         Thought Content:  Thought content normal.         Judgment: Judgment normal.          Vaccination Histo basis. He has been seen by cardiology–Dr. Jah Su and has undergone a Lexiscan stress test–poor exercise tolerance with about 7.7 METS achieved. Will consider a repeat Lexiscan stress test next year.   He does not have any chest pain, Palpitations or short DOSE FLU 65 YRS AND OLDER PRSV FREE SINGLE D (89461) FLU CLINIC 10/28/2016   • Influenza 12/26/2012   • Influenza Vaccine, Preserv Free 10/27/2011   • Pneumococcal (Prevnar 13) 10/28/2016   • Pneumovax 23 06/02/2015, 08/20/2020   • TDAP 06/02/2015   Recomm glimepiride 1 MG Oral Tab    metFORMIN HCl  MG Oral Tablet 24 Hr               PLAN SUMMARY:   Diagnoses and all orders for this visit:    Medicare annual wellness visit, subsequent    Age-related nuclear cataract of both eyes    Atypical nevi    Jessika 3/22/2022). Return in 6 months (on 3/22/2022).      Kaylie Greco MD, 9/22/2021     General Health     In the past six months, have you lost more than 10 pounds without trying?: 2 - No  Has your appetite been poor?: No  How does the patient maintain a go is at high risk or previous colonoscopy was abnormal -    Colonoscopy due on 01/01/2021    Flexible Sigmoidoscopy   Covered every 4 years -    Fecal Occult Blood Test Covered annually -   Prostate Cancer Screening    Prostate-Specific Antigen (PSA) Annuall CREATSERUM 0.92 09/08/2021         BUN Annually Lab Results   Component Value Date    BUN 19 (H) 09/08/2021       Drug Serum Conc Annually No results found for: DIGOXIN, DIG, VALP

## 2021-09-22 NOTE — ASSESSMENT & PLAN NOTE
Chronic mild depressive disorder, has been off his Lexapro. On and off mood fluctuations but otherwise stable.

## 2021-09-22 NOTE — ASSESSMENT & PLAN NOTE
Normal exam.  Labs as ordered. Skin check–normal  No cervical, axillary, inguinal lymphadenopathy. Hernial orifices intact. Rectal exam normal,prostate normal to palpation. Small hemorrhoids present.  guaic negetive brown stools.   Colonoscopy due on 01

## 2021-09-22 NOTE — PATIENT INSTRUCTIONS
Demetra Finley's SCREENING SCHEDULE   Tests on this list are recommended by your physician but may not be covered, or covered at this frequency, by your insurer. Please check with your insurance carrier before scheduling to verify coverage.    Trinh Herrera vaccine ([id number] & [id number]) covered once after 65 Prevnar 13: 10/28/2016    [id number]: 08/20/2020     No recommendations at this time    Hepatitis B One screening covered for patients with certain risk factors   -  No recommendations at this time Health. This site has a lot of good information including definitions of the different types of Advance Directives.  It also has the State forms available on it's website for anyone to review and print using their home computer and printer. (the forms are a

## 2021-09-22 NOTE — ASSESSMENT & PLAN NOTE
Patient with moderate bilateral cataracts, monitored per ophthalmology. Last visit about a year back. Follow-up visit recommended. No significant visual compromise.

## 2021-09-22 NOTE — ASSESSMENT & PLAN NOTE
CT calcium scoring study completed last year with significantly elevated calcium scores. His diabetes as well as cholesterol panel is well controlled. He is on rosuvastatin on a daily basis.   He has been seen by cardiology–Dr. Darshana Nassar and has undergone a Le

## 2021-09-22 NOTE — ASSESSMENT & PLAN NOTE
There were no vitals taken for this visit. Current medications–ramipril 1.5 mg 1 capsule daily. Kidney functions have been stable. No proteinuria. No chest pain, palpitations, shortness or lower extremity edema.

## 2021-09-22 NOTE — ASSESSMENT & PLAN NOTE
Lipid panel liver function tests have been stable on rosuvastatin at 10 mg daily. He is tolerating medications well continue on the same dose of medication, recheck labs and follow-up in about 6 months.

## 2021-09-22 NOTE — ASSESSMENT & PLAN NOTE
Patient is currently on allopurinol which he has tolerated well. Liver and kidney function to be normal.  He has not had any recent exacerbations. Continue to monitor.

## 2021-09-28 ENCOUNTER — OFFICE VISIT (OUTPATIENT)
Dept: OPHTHALMOLOGY | Facility: CLINIC | Age: 71
End: 2021-09-28
Payer: MEDICARE

## 2021-09-28 DIAGNOSIS — H40.003 GLAUCOMA SUSPECT OF BOTH EYES: ICD-10-CM

## 2021-09-28 DIAGNOSIS — E11.9 DIABETES MELLITUS TYPE 2 WITHOUT RETINOPATHY (HCC): Primary | ICD-10-CM

## 2021-09-28 DIAGNOSIS — H43.393 VITREOUS FLOATERS OF BOTH EYES: ICD-10-CM

## 2021-09-28 DIAGNOSIS — H25.13 AGE-RELATED NUCLEAR CATARACT OF BOTH EYES: ICD-10-CM

## 2021-09-28 PROCEDURE — 92015 DETERMINE REFRACTIVE STATE: CPT | Performed by: OPHTHALMOLOGY

## 2021-09-28 PROCEDURE — 92014 COMPRE OPH EXAM EST PT 1/>: CPT | Performed by: OPHTHALMOLOGY

## 2021-09-28 NOTE — ASSESSMENT & PLAN NOTE
Discussed moderate cataracts in both eyes that are  affecting vision, but are not surgical at this time. New glasses today; update as needed. Discussed that new prescription is only a slight change.

## 2021-09-28 NOTE — PROGRESS NOTES
Rich Khanna is a 66year old male.     HPI:     HPI     Consult      Additional comments: Consult per Dr. Cuate Vera              Diabetic Eye Exam      Additional comments: Pt has been a diabetic for 21 years       Pt's diabetes is currently controlled by MG Oral Tab Half a tablet daily with breakfast 90 tablet 2   • allopurinol 100 MG Oral Tab Take 1 tablet by mouth once daily 90 tablet 2   • metFORMIN HCl  MG Oral Tablet 24 Hr Take 1 tablet (750 mg total) by mouth 2 (two) times daily with meals.  [address] [place] [address] Right Left    Lids/Lashes Dermatochalasis, Meibomian gland dysfunction Dermatochalasis, Meibomian gland dysfunction    Conjunctiva/Sclera Normal Normal    Cornea Clear Clear    Anterior Chamber Deep and quiet Deep and quiet    Iris Normal Normal    Lens prescription is only a slight change. Vitreous floaters of both eyes  No treatment. No orders of the defined types were placed in this encounter.       Meds This Visit:  Requested Prescriptions      No prescriptions requested or ordered in this

## 2021-11-15 ENCOUNTER — HOSPITAL ENCOUNTER (OUTPATIENT)
Dept: ULTRASOUND IMAGING | Facility: HOSPITAL | Age: 71
Discharge: HOME | End: 2021-11-15
Attending: INTERNAL MEDICINE
Payer: MEDICARE

## 2021-11-15 DIAGNOSIS — R09.89 BRUIT OF RIGHT CAROTID ARTERY: ICD-10-CM

## 2021-11-15 PROCEDURE — 93880 EXTRACRANIAL BILAT STUDY: CPT | Performed by: INTERNAL MEDICINE

## 2021-12-13 NOTE — PATIENT INSTRUCTIONS
"Oncology Social Work  Referral Follow Up    OSW received referral from KORI Cedillo for supportive oncology. OSW met with pt and her  during infusion. Pt reports that she has been experiencing acute anxiety, particularly over the last two weeks. In addition to her own diagnosis, pt has been caring for her mother who has dementia. Her mother experienced delirium in conjunction with a UTI and spent a night in the hospital, and pt reports that this was the precipitating event in her current crisis. Pt reports that since that time, she has felt, \"on edge, and shaky\" and that her normal coping mechanisms aren't working. She is struggling to sleep or relaxed and all her energy is focused on caring for, and worrying about, her mother.   Pt's sister and son also live with her mother and her. Pt's sister, despite a disability, provides a great deal of help to pt's mother. Pt shares that she has adult children, but that she and her small family have limited supports. Pt reports that prior to this event, over the last 7-8 months, her anxiety has begun to prevent her from engaging in regular activities, like: driving, running errands, etc.  Pt would like help both with her mother, and managing her own anxiety.    Interventions  1. OSW provided active and reflective listening, validating and normalizing pt's feelings.  2. OSW provided education regarding the benefits of the Zyprexa that pt was just prescribed. OSW also shared that after having an opportunity to try this, should additional support be needed, Bina Armendariz is available to aid further with symptom and medication management. Pt v.u. and agreed with this.  3. OSW provided pt with written information about [facility], and encouraged pt to call and place referral for her mother for additional in home support and respite care. Pt v.u. and is eager to pursue this.  4. OSW offered to provide supportive counseling to pt, " Problem List Items Addressed This Visit        Unprioritized    Type II diabetes mellitus, uncontrolled ([facility] [address] - Primary     Patient's blood sugars were elevated to about 180 and above and his A1c was at 7.9 at his last visit.   He was on metformin 750 mg 2 as pt is not able to utilize telehealth for counseling via [facility]. OSW will reach out to pt next week to follow up on the efficacy and impact of her Zyprexa and to schedule first meeting.    Pt denied any additional needs at this time. OSW will remain available for future needs and follow up.     Chayito Quezada, CSW  Oncology Social Worker

## 2022-03-23 ENCOUNTER — LAB ENCOUNTER (OUTPATIENT)
Dept: LAB | Facility: HOSPITAL | Age: 72
End: 2022-03-23
Attending: INTERNAL MEDICINE
Payer: MEDICARE

## 2022-03-23 DIAGNOSIS — E11.65 TYPE 2 DIABETES MELLITUS WITH HYPERGLYCEMIA, WITHOUT LONG-TERM CURRENT USE OF INSULIN (HCC): ICD-10-CM

## 2022-03-23 DIAGNOSIS — E55.9 VITAMIN D DEFICIENCY: ICD-10-CM

## 2022-03-23 LAB
ALBUMIN SERPL-MCNC: 3.8 G/DL (ref 3.4–5)
ALBUMIN/GLOB SERPL: 1.2 {RATIO} (ref 1–2)
ALP LIVER SERPL-CCNC: 66 U/L
ALT SERPL-CCNC: 26 U/L
ANION GAP SERPL CALC-SCNC: 5 MMOL/L (ref 0–18)
AST SERPL-CCNC: 23 U/L (ref 15–37)
BASOPHILS # BLD AUTO: 0.04 X10(3) UL (ref 0–0.2)
BASOPHILS NFR BLD AUTO: 0.4 %
BILIRUB SERPL-MCNC: 0.5 MG/DL (ref 0.1–2)
BILIRUB UR QL: NEGATIVE
BUN BLD-MCNC: 19 MG/DL (ref 7–18)
BUN/CREAT SERPL: 18.3 (ref 10–20)
CALCIUM BLD-MCNC: 9.5 MG/DL (ref 8.5–10.1)
CHLORIDE SERPL-SCNC: 103 MMOL/L (ref 98–112)
CHOLEST SERPL-MCNC: 97 MG/DL (ref ?–200)
CLARITY UR: CLEAR
CO2 SERPL-SCNC: 30 MMOL/L (ref 21–32)
COLOR UR: YELLOW
CREAT BLD-MCNC: 1.04 MG/DL
CREAT UR-SCNC: 79.5 MG/DL
DEPRECATED RDW RBC AUTO: 44.1 FL (ref 35.1–46.3)
EOSINOPHIL # BLD AUTO: 0.17 X10(3) UL (ref 0–0.7)
EOSINOPHIL NFR BLD AUTO: 1.9 %
ERYTHROCYTE [DISTWIDTH] IN BLOOD BY AUTOMATED COUNT: 12.4 % (ref 11–15)
EST. AVERAGE GLUCOSE BLD GHB EST-MCNC: 140 MG/DL (ref 68–126)
FASTING PATIENT LIPID ANSWER: YES
FASTING STATUS PATIENT QL REPORTED: YES
GLOBULIN PLAS-MCNC: 3.3 G/DL (ref 2.8–4.4)
GLUCOSE BLD-MCNC: 124 MG/DL (ref 70–99)
GLUCOSE UR-MCNC: NEGATIVE MG/DL
HBA1C MFR BLD: 6.5 % (ref ?–5.7)
HCT VFR BLD AUTO: 45.4 %
HDLC SERPL-MCNC: 42 MG/DL (ref 40–59)
IMM GRANULOCYTES # BLD AUTO: 0.04 X10(3) UL (ref 0–1)
IMM GRANULOCYTES NFR BLD: 0.4 %
LDLC SERPL CALC-MCNC: 35 MG/DL (ref ?–100)
LEUKOCYTE ESTERASE UR QL STRIP.AUTO: NEGATIVE
LYMPHOCYTES # BLD AUTO: 2.96 X10(3) UL (ref 1–4)
LYMPHOCYTES NFR BLD AUTO: 33 %
MCH RBC QN AUTO: 31.8 PG (ref 26–34)
MCHC RBC AUTO-ENTMCNC: 32.8 G/DL (ref 31–37)
MCV RBC AUTO: 97 FL
MICROALBUMIN UR-MCNC: 0.5 MG/DL
MICROALBUMIN/CREAT 24H UR-RTO: 6.3 UG/MG (ref ?–30)
MONOCYTES # BLD AUTO: 0.8 X10(3) UL (ref 0.1–1)
NEUTROPHILS # BLD AUTO: 4.95 X10 (3) UL (ref 1.5–7.7)
NEUTROPHILS # BLD AUTO: 4.95 X10(3) UL (ref 1.5–7.7)
NEUTROPHILS NFR BLD AUTO: 55.4 %
NITRITE UR QL STRIP.AUTO: NEGATIVE
NONHDLC SERPL-MCNC: 55 MG/DL (ref ?–130)
OSMOLALITY SERPL CALC.SUM OF ELEC: 290 MOSM/KG (ref 275–295)
PH UR: 6 [PH] (ref 5–8)
PLATELET # BLD AUTO: 257 10(3)UL (ref 150–450)
POTASSIUM SERPL-SCNC: 5.6 MMOL/L (ref 3.5–5.1)
PROT SERPL-MCNC: 7.1 G/DL (ref 6.4–8.2)
PROT UR-MCNC: NEGATIVE MG/DL
RBC # BLD AUTO: 4.68 X10(6)UL
SODIUM SERPL-SCNC: 138 MMOL/L (ref 136–145)
SP GR UR STRIP: 1.01 (ref 1–1.03)
TRIGL SERPL-MCNC: 109 MG/DL (ref 30–149)
UROBILINOGEN UR STRIP-ACNC: <2
VIT C UR-MCNC: NEGATIVE MG/DL
VIT D+METAB SERPL-MCNC: 55 NG/ML (ref 30–100)
VLDLC SERPL CALC-MCNC: 15 MG/DL (ref 0–30)
WBC # BLD AUTO: 9 X10(3) UL (ref 4–11)

## 2022-03-23 PROCEDURE — 80061 LIPID PANEL: CPT

## 2022-03-23 PROCEDURE — 85025 COMPLETE CBC W/AUTO DIFF WBC: CPT

## 2022-03-23 PROCEDURE — 82306 VITAMIN D 25 HYDROXY: CPT

## 2022-03-23 PROCEDURE — 82570 ASSAY OF URINE CREATININE: CPT

## 2022-03-23 PROCEDURE — 36415 COLL VENOUS BLD VENIPUNCTURE: CPT

## 2022-03-23 PROCEDURE — 83036 HEMOGLOBIN GLYCOSYLATED A1C: CPT

## 2022-03-23 PROCEDURE — 80053 COMPREHEN METABOLIC PANEL: CPT

## 2022-03-23 PROCEDURE — 81001 URINALYSIS AUTO W/SCOPE: CPT

## 2022-03-23 PROCEDURE — 82043 UR ALBUMIN QUANTITATIVE: CPT

## 2022-05-02 NOTE — PROGRESS NOTES
HPI:    Patient ID: Jena Ayala is a 75year old male. PSA due on 06/02/2019  Colonoscopy,10 Years due on 01/01/2021          Knee Pain    This is a new problem. There has been no history of extremity trauma. The problem occurs rarely.  Progression sin This is a historical note converted from Irma. Formatting and pictures may have been removed.  Please reference Irma for original formatting and attached multimedia. Chief Complaint  office visit-follow up  History of Present Illness  Sinai Perez MD [facility]  ?   Radiation Oncologist: Jaime Julien MD  ?   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  ?????????????????????? 12/11/18  ?????????????????????? Abnormal PET right vocal cord  ?? ? ? ? ? ? ? ? ? ??? Contralateral hypermetabolic left hilar node  ?????????????????????? Abnormal rib uptake question of posttraumatic [2]  ?  ?  Current Treatment: Carboplatin AUC5 and Gemzar 800mg/m2 Day 1 and Day 8?every 21???????????? days??????????Started 4/10/19 [1]  ?  ?  ?  Interval history:?Patient was last seen in clinic on 5/21/2019.? He is here today for follow-up.?  ?  Patient underwent a PET CT scan on June 17, 2019.? Patient shows a right lower lobe hypermetabolic mass and mediastinal metastatic disease and a small pulmonary nodule in the superior left lower lobe.? There is no evidence of disease outside the chest.? However the lesion in the right lower lobe now measures 6.7 x 5 cm this previously measured 5.6 x 4.7 cm.? With an SUV of 18.? The subcarinal lymph node is now 20 x 17 mm were previously was 12 x 6 mm and a paratracheal lymph node is now 26 x 17 versus 13 x 6.  ?  Patients  data reviewed.? Patients last pain prescription was filled on 4/30/2019.? He had some temazepam filled by his primary care on 5/31.  Oncology Summary  Treatment History:  10/31/18: CT chest- right perihilar mass highly suggestive of underlying malignancy there were several mediastinal lymph nodes including AP?????  ???? window?node 1.7 x 0 point left perihilar node 1.3 x 1.4 cm and a 2.6 x 2.8 solid appearing mass in the right perihilar region.  ?   12/11/18: Bronchoscopy- RLL poorly differentiated SCC  PFTs-?FEV1  76%, FVC 84%  ???? Postbronchodilator: FEV1 86% FVC 88%; Total lung capacity 111; DLCO 44% Residual volume 189  ?   1/22/19: PET- focal asymmetric hypermetabolic active lesion 4.5 SUV on the right posterior vocal cord at the level of the cartilage.  Within the chest there is a large hypermetabolic solid mass involving the pulmonary right lower lobe with SUV of 14. ?With a contralateral hypermetabolic left hilar lymph node SUV of 5.4. ?There is no ipsilateral right hilar adenopathy. ?There is abnormal hypermetabolic activity involving the sequential anterior left sixth seventh and eighth rib with a nondisplaced fracture at the seventh rib presumed posttraumatic lipoma at the right lower lateral chest wall.  Peripheral atelectasis within the anterior segment left lower lobe there is a 7 mm nodule within the superior segment left lower lobe impression highly concerning for bronchitic carcinoma involving the superior segment right lower lobe with a contralateral left hilar hypermetabolic lymph node. ?And activity involving multiple left lower anterior ribs.  ?   2/18/19: Bone scan shows old rib fractures, no evidence of metastatic disease.  ?   3/14/19: Patient was scheduled for biopsy of left hilar node, however, he did not stop his  ???????????? Plavix and this was rescheduled for 3/28/19.  ?   3/2019: Biopsy of left hilar node was inconclusive, will treat with chemotherapy and  ??????????? consider radiation in the future if symptomatic or on progression [2]  Review of Systems  Constitutional: ?[No fever, fatigue or weight loss. ?No chills, no night sweats. ?No weakness] ?  Eyes:? [No recent vision changes,]?still with dry eyes  ENT:? [No congestion, ear pain, or sore throat. no ringing in ears. No Dry mouth. no epistaxis .? No dental problems.? No epistaxis]?  Endocrine:? [No thyroid problems. ]?  Cardiovascular:? [No chest pain, Palpitations,? PND, orthopnea.? Edema]?  Respiratory:? [Still with cough,?ambulates  The current treatment provides significant improvement. There are no compliance problems. There is no history of kidney disease, heart failure or PVD. Gout   Associated symptoms include weakness.  Pertinent negatives include no chest pain, fatigue or nec atraumatic. Right Ear: External ear normal.   Left Ear: External ear normal.   Nose: Nose normal.   Mouth/Throat: Oropharynx is clear and moist. No oropharyngeal exudate.    Eyes: Conjunctivae and EOM are normal. Pupils are equal, round, and reactive to l with his oxygen..? No hemoptysis]?  Gastrointestinal:? [No abdominal pain, nausea, vomiting, . No jaundice  NO Black tarry stool].? No heartburn.? No anorexia.? Stool.  Genitourinary: [No dysuria.? +o frequency.]?  Musculoskeletal:? [No joint swelling.? No joint pain.]?  Neurologic:? [No seizures.? Chronic neuropathy. No Headaches. No loss of balance. no new weakness.? No headaches.? No tremors]  Skin:? [No rash.? No new skin lesions. no excess dryness]?  Hematologic:? [No unusual bruising or bleeding.]?  Psychiatric:? [No psychiatric problems, hallucinations or depression.]?  [All other systems reviewed and otherwise negative.]  ?  ?  ?  Physical Exam  Vitals & Measurements  T:?36.9? ?C (Oral)? HR:?86(Peripheral)? BP:?124/71?  HT:?174?cm? WT:?82.6?kg? BMI:?27.28?  VITAL SIGNS: ?Reviewed. ?[ ?]  GENERAL: [Well-developed well-nourished in no apparent cardiorespiratory distress]  Oxygen dependent  HEAD: Normocephalic, atraumatic  EYES:? Pupils are equal.?? reactive to light.? Extraocular motions intact.? No scleral icterus.? No pallor.  EARS:? Hearing grossly intact.  MOUTH:? Oropharynx is clear without exudates or erythema.  NECK: Supple no adenopathy, no JVD.? Trachea midline  CHEST:?Scattered wheezing, coarse breath sounds bilaterally  CARDIAC:? Regular rate and rhythm.? S1 and S2, without murmurs, gallops, or rubs.  VASCULAR:? No Edema.? Peripheral pulses normal and equal in all extremities.  ABDOMEN:? Soft, without detectable tenderness.? No sign of distention.? No?? rebound or guarding, and no masses palpated.?? Bowel Sounds normal.  MUSCULOSKELETAL:?Spine nontender,?antalgic kyphotic type gait and posture.. Extremities without clubbing, cyanosis or edema.?  NEUROLOGIC EXAM:? Alert and oriented x 3.? Lower extremity strength 3+ out of 5. ?Upper extremity strength 4 out of 5..?? Speech normal.? Follows commands.? With a rolling walker  PSYCHIATRIC:? Mood normal.  SKIN:?Groin seborrheic keratosis.  Lymph nodes:  improving prepatellar bursitis and septic bursitis. He has completed his cefadroxil after inpatient treatment with vancomycin and Ancef. He has remained afebrile. The prepatellar area has some erythema but no induration  any further.   His white count is no? supraclavicular, infraclavicular, axillary, or inguinal adenopathy  ECOG-2 [1] [1]  Assessment/Plan  1.?Squamous cell carcinoma of bronchus in right lower lobe?C34.31  ?  2.?Pain?R52  ?  79-year-old gentleman?with?stage IIIc?non-small cell lung carcinoma.? Status post carboplatin?and gemcitabine with progressive disease.  His PDL 1 percentage? 0%  I?doubt? that chemotherapy?will benefit this patient.  Standard options?in patients who have progressive disease after frontline therapy would include single agent Taxotere,?immunotherapy, and best supportive care and hospice.  Palliative radiation.  I am concerned with this patients neuropathy?that any worsening neuropathy with Taxotere?would ruin his quality of life.  We discussed hospice.? We discussed palliative radiation,?and we also discussed immunotherapy.  We will send him back to Dr. Julien for evaluation of palliative radiation.  ?  We will refill his pain prescription today.  Okay to increase his gabapentin?by his primary care  Return to clinic in 4 weeks with CBC CMP  If they do not proceed with radiation therapy,?we could consider?a trial of immunotherapy?however with?no ?PDL 1 expression??the chances of a meaningful response?are small.  Patient understood the above ,he had the opportunity to ask questions.  ?  ?  ?  ?   Problem List/Past Medical History  Ongoing  Pain  Review of care plan  Squamous cell carcinoma of bronchus in right lower lobe  Pretension  Diabetes  Mild dementia  Depression  Diabetic neuropathy  Glaucoma  Peripheral vascular disease  Oxygen dependent COPD  Chronic reflux  ED  Procedure/Surgical History  Cataract (1989)  Circumcision (1960)  Cystectomy (1960)   Medications  acetaminophen-hydrocodone 325 mg-10 mg oral tablet, 1 tab(s), Oral, q6hr, PRN  acetaminophen-oxycodone 325 mg-10 mg oral tablet, 1 tab(s), Oral, q6hr  amlodipine 10 mg oral tablet, 10 mg= 1 tab(s), Oral, Daily  Artificial Tears preserved ophthalmic solution, 1  atorvastatin 10 MG Oral Tab 90 tablet 1      Si tab po qd      allopurinol 100 MG Oral Tab 90 tablet 1      Sig: Take 1 tablet (100 mg total) by mouth daily.       Glucose Blood (VINNIE CONTOUR TEST) In Vitro Strip 100 strip 3      Sig: USE ONE STRIP drop(s), Eye-Both, QID, PRN  budesonide-formoterol 160 mcg-4.5 mcg/inh inhalation aerosol, 2 puff(s), INH, BID  chlorthalidone 25 mg oral tablet, 25 mg= 1 tab(s), Oral, Daily  clopidogrel 75 mg oral tablet, 75 mg= 1 tab(s), Oral, Daily  diclofenac 1% topical gel, 1 apple, TOP, q8hr  donepezil 10 mg oral tablet, 10 mg= 1 tab(s), Oral, At Bedtime  gabapentin 600 mg oral tablet, 600 mg= 1 tab(s), Oral, At Bedtime  glipiZIDE 10 mg oral tablet, 10 mg= 1 tab(s), Oral, Daily  Heparin Flush 100 U/mL - 5 mL, 500 units= 5 mL, IV Push, Once-chemo  Heparin Flush 100 U/mL - 5 mL, 500 units= 5 mL, IV Push, Once-chemo  olodaterol 2.5 mcg/inh inhalation aerosol, 2 puff(s), INH, Daily  Pantoprazole 40 mg ORAL EC-Tablet, 40 mg= 1 tab(s), Oral, Daily  potassium chloride 10 mEq oral CAPSULE extended release, 20 mEq= 2 cap(s), Oral, Daily  Refresh Plus ophthalmic solution, 1 drop(s), Eye-Both, QID, PRN  sertraline 100 mg oral tablet, 100 mg= 1 tab(s), Oral, Daily  sildenafil 100 mg oral tablet, 100 mg= 1 tab(s), Oral, Daily  tamsulosin 0.4 mg oral capsule, 0.4 mg= 1 cap(s), Oral, Daily  Zofran 8 mg oral tablet, 8 mg= 1 tab(s), Oral, q8hr, PRN  Zofran 8 mg oral tablet, 8 mg= 1 tab(s), Oral, q8hr, 2 refills  Allergies  penicillins?(rash)  Social History  Abuse/Neglect  No, 06/19/2019  Alcohol  Current, 1-2 times per month, 01/24/2019  Substance Use  Current, Marijuana, 01/24/2019  Tobacco  Former smoker, quit more than 30 days ago, N/A, 06/19/2019  Former smoker, quit more than 30 days ago, No, 05/29/2019  Former smoker, quit more than 30 days ago, N/A, 05/22/2019  Former smoker, quit more than 30 days ago, N/A, 05/01/2019  Former smoker, quit more than 30 days ago, N/A, 04/30/2019  Former smoker, quit more than 30 days ago, N/A, 04/18/2019  Former smoker, quit more than 30 days ago, N/A, 04/17/2019  Former smoker, quit more than 30 days ago, N/A, 04/10/2019  Former smoker, quit more than 30 days ago, N/A, 03/21/2019  Former smoker, quit  more than 30 days ago, No, 03/12/2019  Former smoker, quit more than 30 days ago, N/A, 03/07/2019  Former smoker, quit more than 30 days ago, N/A, 02/07/2019  Former smoker, quit more than 30 days ago, No, 01/24/2019  Family History  Hypertension.: Mother.  Stroke: Mother.  Lab Results  Test Name Test Result Date/Time   WBC 3.8 x10(3)/mcL (Low) 06/19/2019 08:54 CDT   RBC 3.69 x10(6)/mcL (Low) 06/19/2019 08:54 CDT   Hgb 11.2 gm/dL (Low) 06/19/2019 08:54 CDT   Hct 35.7 % (Low) 06/19/2019 08:54 CDT   Platelet 293 x10(3)/mcL 06/19/2019 08:54 CDT   MCV 96.7 fL (High) 06/19/2019 08:54 CDT   MCH 30.4 pg 06/19/2019 08:54 CDT   MCHC 31.4 gm/dL (Low) 06/19/2019 08:54 CDT   RDW 16.8 % 06/19/2019 08:54 CDT   MPV 8.8 fL (Low) 06/19/2019 08:54 CDT   Abs Neut 1.79 x10(3)/mcL (Low) 06/19/2019 08:54 CDT   NEUT% 46.6 % 06/19/2019 08:54 CDT   NEUT# 1.8 x10(3)/mcL (Low) 06/19/2019 08:54 CDT   LYMPH% 40.4 % 06/19/2019 08:54 CDT   LYMPH# 1.6 x10(3)/mcL 06/19/2019 08:54 CDT   MONO% 11.2 % 06/19/2019 08:54 CDT   MONO# 0.4 x10(3)/mcL 06/19/2019 08:54 CDT   EOS% 1.0 % 06/19/2019 08:54 CDT   EOS# 0.0 x10(3)/mcL 06/19/2019 08:54 CDT   BASO% 0.8 % 06/19/2019 08:54 CDT   BASO# 0.0 x10(3)/mcL 06/19/2019 08:54 CDT   Diagnostic Results  (06/17/2019 14:06 CDT PET/CT Tumor (Skull Base to Mid-Thigh))  Impression.  ?  1. Right lower lobe hypermetabolic mass is larger.  2. Worsened mediastinal metastatic disease.  3. Stable small pulmonary nodule superior segment left lower lobe.  4. No PET evidence of metastatic disease outside of the chest.  ? [3]     [1]?Oncology Office Visit; Sebastian Powell MD 05/21/2019 13:39 CDT  [2]?Oncology Office Visit; Sebastian Powell MD 05/21/2019 13:39 CDT  [3]?PET/CT Tumor (Skull Base to Mid-Thigh); Riley Diamond MD 06/17/2019 14:06 CDT

## 2022-12-24 ENCOUNTER — LAB ENCOUNTER (OUTPATIENT)
Dept: LAB | Facility: HOSPITAL | Age: 72
End: 2022-12-24
Attending: INTERNAL MEDICINE
Payer: MEDICARE

## 2022-12-24 ENCOUNTER — HOSPITAL ENCOUNTER (OUTPATIENT)
Dept: GENERAL RADIOLOGY | Facility: HOSPITAL | Age: 72
Discharge: HOME | End: 2022-12-24
Attending: INTERNAL MEDICINE
Payer: MEDICARE

## 2022-12-24 DIAGNOSIS — Z01.818 PRE-OP TESTING: ICD-10-CM

## 2022-12-24 LAB
ANION GAP SERPL CALC-SCNC: 6 MMOL/L (ref 0–18)
BUN BLD-MCNC: 23 MG/DL (ref 7–18)
BUN/CREAT SERPL: 23 (ref 10–20)
CALCIUM BLD-MCNC: 9.6 MG/DL (ref 8.5–10.1)
CHLORIDE SERPL-SCNC: 100 MMOL/L (ref 98–112)
CO2 SERPL-SCNC: 28 MMOL/L (ref 21–32)
CREAT BLD-MCNC: 1 MG/DL
DEPRECATED RDW RBC AUTO: 42.9 FL (ref 35.1–46.3)
ERYTHROCYTE [DISTWIDTH] IN BLOOD BY AUTOMATED COUNT: 12.2 % (ref 11–15)
FASTING STATUS PATIENT QL REPORTED: YES
GFR SERPLBLD BASED ON 1.73 SQ M-ARVRAT: 79 ML/MIN/1.73M2 (ref 60–?)
GLUCOSE BLD-MCNC: 113 MG/DL (ref 70–99)
HCT VFR BLD AUTO: 44.4 %
HGB BLD-MCNC: 14.9 G/DL
INR BLD: 1 (ref 0.85–1.16)
MCH RBC QN AUTO: 31.7 PG (ref 26–34)
MCHC RBC AUTO-ENTMCNC: 33.6 G/DL (ref 31–37)
MCV RBC AUTO: 94.5 FL
OSMOLALITY SERPL CALC.SUM OF ELEC: 282 MOSM/KG (ref 275–295)
PLATELET # BLD AUTO: 274 10(3)UL (ref 150–450)
POTASSIUM SERPL-SCNC: 4.6 MMOL/L (ref 3.5–5.1)
PROTHROMBIN TIME: 13.2 SECONDS (ref 11.6–14.8)
RBC # BLD AUTO: 4.7 X10(6)UL
SARS-COV-2 RNA RESP QL NAA+PROBE: NOT DETECTED
SODIUM SERPL-SCNC: 134 MMOL/L (ref 136–145)
WBC # BLD AUTO: 8.3 X10(3) UL (ref 4–11)

## 2022-12-24 PROCEDURE — 85610 PROTHROMBIN TIME: CPT

## 2022-12-24 PROCEDURE — 71046 X-RAY EXAM CHEST 2 VIEWS: CPT | Performed by: INTERNAL MEDICINE

## 2022-12-24 PROCEDURE — 36415 COLL VENOUS BLD VENIPUNCTURE: CPT

## 2022-12-24 PROCEDURE — 80048 BASIC METABOLIC PNL TOTAL CA: CPT

## 2022-12-24 PROCEDURE — 85027 COMPLETE CBC AUTOMATED: CPT

## 2022-12-27 ENCOUNTER — HOSPITAL ENCOUNTER (OUTPATIENT)
Dept: INTERVENTIONAL RADIOLOGY/VASCULAR | Facility: HOSPITAL | Age: 72
Discharge: HOME | End: 2022-12-27
Attending: INTERNAL MEDICINE | Admitting: INTERNAL MEDICINE
Payer: MEDICARE

## 2022-12-27 VITALS
DIASTOLIC BLOOD PRESSURE: 82 MMHG | RESPIRATION RATE: 17 BRPM | SYSTOLIC BLOOD PRESSURE: 169 MMHG | HEART RATE: 66 BPM | HEIGHT: 72 IN | WEIGHT: 214 LBS | BODY MASS INDEX: 28.99 KG/M2 | TEMPERATURE: 97 F | OXYGEN SATURATION: 97 %

## 2022-12-27 DIAGNOSIS — Z01.818 PRE-OP TESTING: Primary | ICD-10-CM

## 2022-12-27 DIAGNOSIS — R06.02 SOB (SHORTNESS OF BREATH): ICD-10-CM

## 2022-12-27 DIAGNOSIS — R94.39 ABNORMAL STRESS TEST: ICD-10-CM

## 2022-12-27 LAB
GLUCOSE BLDC GLUCOMTR-MCNC: 100 MG/DL (ref 70–99)
ISTAT ACTIVATED CLOTTING TIME: 287 SECONDS (ref 125–137)

## 2022-12-27 PROCEDURE — 99153 MOD SED SAME PHYS/QHP EA: CPT

## 2022-12-27 PROCEDURE — 93458 L HRT ARTERY/VENTRICLE ANGIO: CPT

## 2022-12-27 PROCEDURE — 99152 MOD SED SAME PHYS/QHP 5/>YRS: CPT

## 2022-12-27 PROCEDURE — 4A023N7 MEASUREMENT OF CARDIAC SAMPLING AND PRESSURE, LEFT HEART, PERCUTANEOUS APPROACH: ICD-10-PCS | Performed by: INTERNAL MEDICINE

## 2022-12-27 PROCEDURE — 027135Z DILATION OF CORONARY ARTERY, TWO ARTERIES WITH TWO DRUG-ELUTING INTRALUMINAL DEVICES, PERCUTANEOUS APPROACH: ICD-10-PCS | Performed by: INTERNAL MEDICINE

## 2022-12-27 PROCEDURE — 36415 COLL VENOUS BLD VENIPUNCTURE: CPT

## 2022-12-27 PROCEDURE — 85347 COAGULATION TIME ACTIVATED: CPT

## 2022-12-27 PROCEDURE — 93571 IV DOP VEL&/PRESS C FLO 1ST: CPT

## 2022-12-27 PROCEDURE — 4A033BC MEASUREMENT OF ARTERIAL PRESSURE, CORONARY, PERCUTANEOUS APPROACH: ICD-10-PCS | Performed by: INTERNAL MEDICINE

## 2022-12-27 PROCEDURE — B2111ZZ FLUOROSCOPY OF MULTIPLE CORONARY ARTERIES USING LOW OSMOLAR CONTRAST: ICD-10-PCS | Performed by: INTERNAL MEDICINE

## 2022-12-27 PROCEDURE — 82962 GLUCOSE BLOOD TEST: CPT

## 2022-12-27 RX ORDER — VERAPAMIL HYDROCHLORIDE 2.5 MG/ML
INJECTION, SOLUTION INTRAVENOUS
Status: COMPLETED
Start: 2022-12-27 | End: 2022-12-27

## 2022-12-27 RX ORDER — ASPIRIN 81 MG/1
81 TABLET ORAL DAILY
Status: DISCONTINUED | OUTPATIENT
Start: 2022-12-28 | End: 2022-12-27

## 2022-12-27 RX ORDER — CLOPIDOGREL BISULFATE 75 MG/1
75 TABLET ORAL DAILY
Status: DISCONTINUED | OUTPATIENT
Start: 2022-12-28 | End: 2022-12-27

## 2022-12-27 RX ORDER — CLOPIDOGREL BISULFATE 75 MG/1
TABLET ORAL
Status: COMPLETED
Start: 2022-12-27 | End: 2022-12-27

## 2022-12-27 RX ORDER — ESCITALOPRAM OXALATE 5 MG/1
5 TABLET ORAL DAILY
COMMUNITY
Start: 2022-12-06

## 2022-12-27 RX ORDER — LIDOCAINE HYDROCHLORIDE 20 MG/ML
INJECTION, SOLUTION EPIDURAL; INFILTRATION; INTRACAUDAL; PERINEURAL
Status: COMPLETED
Start: 2022-12-27 | End: 2022-12-27

## 2022-12-27 RX ORDER — HEPARIN SODIUM 1000 [USP'U]/ML
INJECTION, SOLUTION INTRAVENOUS; SUBCUTANEOUS
Status: COMPLETED
Start: 2022-12-27 | End: 2022-12-27

## 2022-12-27 RX ORDER — ROSUVASTATIN CALCIUM 20 MG/1
20 TABLET, COATED ORAL NIGHTLY
Qty: 90 TABLET | Refills: 3 | Status: SHIPPED | OUTPATIENT
Start: 2022-12-27

## 2022-12-27 RX ORDER — NITROGLYCERIN 20 MG/100ML
INJECTION INTRAVENOUS
Status: COMPLETED
Start: 2022-12-27 | End: 2022-12-27

## 2022-12-27 RX ORDER — SODIUM CHLORIDE 9 MG/ML
INJECTION, SOLUTION INTRAVENOUS CONTINUOUS
Status: ACTIVE | OUTPATIENT
Start: 2022-12-27 | End: 2022-12-27

## 2022-12-27 RX ORDER — CLOPIDOGREL BISULFATE 75 MG/1
75 TABLET ORAL DAILY
Qty: 90 TABLET | Refills: 3 | Status: SHIPPED | OUTPATIENT
Start: 2022-12-28

## 2022-12-27 RX ORDER — CHLORHEXIDINE GLUCONATE 4 G/100ML
30 SOLUTION TOPICAL
Status: COMPLETED | OUTPATIENT
Start: 2022-12-27 | End: 2022-12-27

## 2022-12-27 RX ORDER — MIDAZOLAM HYDROCHLORIDE 1 MG/ML
INJECTION INTRAMUSCULAR; INTRAVENOUS
Status: COMPLETED
Start: 2022-12-27 | End: 2022-12-27

## 2022-12-27 RX ORDER — SODIUM CHLORIDE 9 MG/ML
INJECTION, SOLUTION INTRAVENOUS
Status: COMPLETED | OUTPATIENT
Start: 2022-12-27 | End: 2022-12-27

## 2022-12-27 RX ADMIN — SODIUM CHLORIDE: 9 INJECTION, SOLUTION INTRAVENOUS at 10:00:00

## 2022-12-27 RX ADMIN — CHLORHEXIDINE GLUCONATE 30 ML: 4 SOLUTION TOPICAL at 10:00:00

## 2022-12-27 NOTE — PROCEDURES
[facility]    Cardiac Cath Procedure Note  Joshua Bui Patient Status:  Outpatient    1950 MRN [id number]   Location [facility] Attending Ronny Ordonez MD   Hosp Day # 0 PCP None Pcp       Cardiologist: Jose Bolton MD  Primary Proceduralist: Jose Bolton MD  Procedure Performed: LHC, LV and FFR LAD, PCI OM, PCI RCA  Date of Procedure: 2022   Indication: Abnormal stress test    Summary of procedure:    Multivessel coronary disease: Moderate but nonobstructive LAD disease therefore PCI OM and RCA      Assessment:  CAD: Multivessel disease however FFR negative LAD therefore percutaneous intervention of the OM and RCA. HFpEF: LVEDP normal    Cardiac risk factors: Hypertension, hyperlipidemia, diabetes      Recommendations:  DAPT: Aspirin and Plavix  Increase Crestor to 20 mg daily    Observe for 6 hours and discharge home      Left Ventriculography and hemodynamics:   LV EF not done  LV EDP 15 mmHg  No gradient across aortic valve        Coronary Angiography  RCA:  Dominant, 80% distal RCA disease into large PDA and PL territory    Left main:  Free of obstructive disease    Left anterior descending: Moderate proximal and mid vessel stenosis, 50 to 60%. Remainder of the LAD is free of obstructive disease, supplies multiple diagonals which are non-obstructive    Circumflex:  Free of obstructive disease, supplies large OM branch to the inferolateral wall with 80% proximal vessel stenosis. LAD FFR  Guide catheter: 5 Sinhala Akari 3.75  Wire: Super Derivatives 2 ScaleMP FFR wire  Wire advanced outside of the guider, equalized. Advanced past proximal and mid LAD stenosis. DFR 0.91 (less than 0.89 being significant)      OM intervention  Lesion Characteristics-not torturous, not calcified. Type non-C lesion. Pre-intervention stenosis 80%, Post intervention stenosis 0%.   Pre ELLA 3, Post ELLA 3.      Guide Catheter: 5 Sinhala Akari 3.75  Wire: Run through   Pre-dil: None  Stent: 2.5 x 16 mm Synergy JIMMY at 16 jaden  Post-dil: None      RCA intervention  Lesion Characteristics-moderately torturous, not calcified. Type non-C lesion. Pre-intervention stenosis 80%, Post intervention stenosis 0%. Pre ELLA 3, Post ELLA 3.      Guide Catheter: 5 [place] Akari 3.75  Wire: Run through   Pre-dil: None  Stent: 3.5 x 16 mm Synergy JIMMY via 5 [place] Sravani guide liner. Inflated to 14 jaden  Post-dil: None        Summary of Case: After written informed consent was obtained from the patient, patient was brought to the cardiac catheterization laboratory. Patient was prepped and draped in the usual sterile fashion. Lidocaine 1% was used to infiltrate the right radial artery for local anesthesia and a 6 [place] introducer sheath was inserted into the right radial artery. Selective coronary angiography performed with 5 [place] TIG catheter LCA and RCA. Angiography performed in standard projections. 5 Sinhala Akari guide catheter placed in LV for hemodynamics. Specimen sent to: No specimen collected  Estimated blood loss: 10 cc  Closure:  TR band      IV was maintained by RN and moderate conscious sedation of versed and fentanyl was given. Patient was assessed and monitoring of oxygen, heart rate and blood pressure by nurse and myself during the exam 35 minutes.       Diane Starkey MD  12/27/22

## 2022-12-27 NOTE — CARDIAC REHAB
Cardiac Rehab Phase I    Activity:  Distance   Assistance needed   Patient tolerated activity . Education:  Handouts provided and reviewed: [address] Diet: Healthy Cardiac diet reviewed. Disease Process: Disease process reviewed. Reviewed the following:       RISK FACTORS: Reviewed      SMOKING CESSATION: Reviewed      HOME EXERCISE ACTIVITY: Reviewed      OUTPATIENT CARDIAC REHAB: Referred to Cardiac Rehabilitation Phase 2.

## 2022-12-27 NOTE — DISCHARGE INSTRUCTIONS
TRANSRADIAL DISCHARGE INSTRUCTION  HOME CARE INSTRUCTIONS FOLLOWING CORONARY ANGIOGRAPHY,  INSERTION OF STENT IN THE CORONARY    Activity    DO NOT drive after the procedure. You may resume driving late the following day according to the nurse or physician's instructions  Plan on resting and relaxing tonight and tomorrow  Resume your normal activity after 48 hours, or as instructed by your physician  Avoid drinking alcohol for the next 24 hours  Avoid wrist flexion, extension, and fine motor activities (i.e. texting, typing, using computer mouse, etc.) for 24 hours  Do not lift or pull anything heavier than 5 to 8  pounds with affected hand for 1 week    What is Normal?    A small lump at the procedure site associated with mild tenderness when touched  The procedure site may be bruised or discolored  There may be a small amount of drainage on the bandage    Special Instructions     Drink plenty of fluids during the next 24 hours to \"flush\" the contrast from your system  Keep the bandage clean and dry  After 24 hours, you must remove the bandage. Do not put ointment, powders, or creams to site.   You can shower after removing the bandage, and wash the procedure site gently with soap and water  DO NOT submerge the procedure site for 1 week (no bath tubs or pools)  If you choose to wear a bandage for a few days, make sure it remains clean and dry and that it is changed daily  For local swelling: apply ice  Bleeding can occur at the procedure site - both on the outside of the skin and/or beneath the surface of the skin        If bleeding occurs: Elevate hand above heart and apply local pressure  Swelling or a large lump at the procedure site can occur, which may be accompanied by moderate to severe pain  If the bleeding does not stop, call 911 and continue to apply pressure    When to contact physician: Call Dr. Osvaldo Cabezas right away if you experience     Swelling, pain, or bleeding at the site that is not relieved by applying ice or pressure  Signs of infection: Redness, warmth, drainage at the site, chills, or temperature of 100.5 or greater  Changes in sensation, numbness, or tingling of affected hand    You Received a Stent:    You will remain on an antiplatelet drug and/or aspirin. Antiplatelet medications are usually taken for six months to one year and should not be stopped unless your cardiologist directs you to do so. These medications help to prevent blockage at the stent site. If another physician or dentist asks you to stop your antiplatelet medication, you need to consult your cardiologist first.  Together, your cardiologist and other physician can discuss the risks that may be involved if you are not taking the antiplatelet medication   If an MRI is necessary, it may be done 4-6 weeks after your procedure. Verify this with your cardiologist  Keep your stent card with you at all times! If you need an MRI in the future, your stent card will need to be shown to the technologist before performing the MRI. A duplicate card CANNOT be reproduced. Other    You may resume your present diet, unless otherwise specified by your physician. You may resume all of your medications as prescribed, unless otherwise directed by your physician. A list of your medications was provided to you at discharge. Continue the walking program initiated in the hospital and progress your walking as directed. Or, gradually resume your previous aerobic exercise schedule as tolerated.       If you have any question or concern, please call the on-call nurse at [phone]

## 2022-12-27 NOTE — IVS NOTE
DISCHARGE NOTE     Pt is able to sit up and ambulate without difficulty. Pt voided and tolerated fluids and food. Right radial procedural site remains dry and intact with good circulation, motion, and sensation. No signs and symptoms of bleeding/hematoma noted. Pt denies any pain or discomfort at this time. IV access removed  Instruction provided, patient/family verbalizes understanding. Dr. Verona Blair spoke with patient/family post procedure. Pt discharge via wheelchair to [address] - patient's family at bedside and will be driving patient home     Follow up Appointment:  Wednesday, January 4th at [facility] with Dr. Bree Yoo Prescription: rosuvastatin; Plavix - confirmed with patient's pharmacy, [address] in [facility], that medication was received, will be ready for pickup today and will cost $12

## 2022-12-27 NOTE — INTERVAL H&P NOTE
Pre-op Diagnosis: * No pre-op diagnosis entered *    The above referenced H&P was reviewed by Mick Gardner MD on 12/27/2022, the patient was examined and no significant changes have occurred in the patient's condition since the H&P was performed. I discussed with the patient and/or legal representative the potential benefits, risks and side effects of this procedure; the likelihood of the patient achieving goals; and potential problems that might occur during recuperation. I discussed reasonable alternatives to the procedure, including risks, benefits and side effects related to the alternatives and risks related to not receiving this procedure. We will proceed with procedure as planned.

## 2022-12-27 NOTE — DIETARY NOTE
NUTRITION EDUCATION NOTE     Received consult for cardiac nutrition education. Verbally reviewed basic cardiac diet restrictions. Provided with Eating Heart-Healthy and NCM handout to reinforce. Receptive to instruction. Would benefit from outpt f/u. Expect poor-fair compliance.         Erin Aquino RD, LDN  Clinical Dietitian  P: [phone]

## 2023-01-19 ENCOUNTER — OFFICE VISIT (OUTPATIENT)
Dept: OPHTHALMOLOGY | Facility: CLINIC | Age: 73
End: 2023-01-19

## 2023-01-19 DIAGNOSIS — H53.001 AMBLYOPIA, RIGHT: ICD-10-CM

## 2023-01-19 DIAGNOSIS — H25.13 AGE-RELATED NUCLEAR CATARACT OF BOTH EYES: ICD-10-CM

## 2023-01-19 DIAGNOSIS — H40.003 GLAUCOMA SUSPECT OF BOTH EYES: Primary | ICD-10-CM

## 2023-01-19 DIAGNOSIS — H43.393 VITREOUS FLOATERS OF BOTH EYES: ICD-10-CM

## 2023-01-19 DIAGNOSIS — E11.9 DIABETES MELLITUS TYPE 2 WITHOUT RETINOPATHY (HCC): ICD-10-CM

## 2023-01-19 PROCEDURE — 92014 COMPRE OPH EXAM EST PT 1/>: CPT | Performed by: OPHTHALMOLOGY

## 2023-01-19 PROCEDURE — 92015 DETERMINE REFRACTIVE STATE: CPT | Performed by: OPHTHALMOLOGY

## 2023-01-19 PROCEDURE — 1126F AMNT PAIN NOTED NONE PRSNT: CPT | Performed by: OPHTHALMOLOGY

## 2023-01-19 PROCEDURE — 92250 FUNDUS PHOTOGRAPHY W/I&R: CPT | Performed by: OPHTHALMOLOGY

## 2023-01-19 NOTE — PATIENT INSTRUCTIONS
Glaucoma suspect of both eyes  Discussed with patient that he is a glaucoma suspect based on increased cupping of the optic nerves in both eyes. Retinal photos taken today to document optic nerves. Glaucoma diagnostic testing ordered. Will not start medication, but will continue to observe. Patient verbalized understanding. Diabetes mellitus type 2 without retinopathy ([facility] [address]  Diabetes type II: no background of retinopathy, no signs of neovascularization noted. Discussed ocular and systemic benefits of blood sugar control. Diagnosis and treatment discussed in detail with patient. Age-related nuclear cataract of both eyes  Discussed moderate cataracts in both eyes that are affecting vision but are not surgical at this time. Vitreous floaters of both eyes  No treatment. Amblyopia, right  Longstanding; no treatment.

## 2023-01-24 ENCOUNTER — NURSE ONLY (OUTPATIENT)
Dept: OPHTHALMOLOGY | Facility: CLINIC | Age: 73
End: 2023-01-24

## 2023-01-24 DIAGNOSIS — H40.003 GLAUCOMA SUSPECT OF BOTH EYES: ICD-10-CM

## 2023-01-24 PROCEDURE — 92133 CPTRZD OPH DX IMG PST SGM ON: CPT | Performed by: OPHTHALMOLOGY

## 2023-01-24 PROCEDURE — 92083 EXTENDED VISUAL FIELD XM: CPT | Performed by: OPHTHALMOLOGY

## 2023-02-01 ENCOUNTER — CARDPULM VISIT (OUTPATIENT)
Dept: CARDIAC REHAB | Facility: HOSPITAL | Age: 73
End: 2023-02-01
Attending: INTERNAL MEDICINE
Payer: MEDICARE

## 2023-02-06 ENCOUNTER — CARDPULM VISIT (OUTPATIENT)
Dept: CARDIAC REHAB | Facility: HOSPITAL | Age: 73
End: 2023-02-06
Attending: INTERNAL MEDICINE
Payer: MEDICARE

## 2023-02-06 PROCEDURE — 93798 PHYS/QHP OP CAR RHAB W/ECG: CPT

## 2023-02-07 ENCOUNTER — ORDER TRANSCRIPTION (OUTPATIENT)
Dept: CARDIAC REHAB | Facility: HOSPITAL | Age: 73
End: 2023-02-07

## 2023-02-07 DIAGNOSIS — Z98.61 S/P PTCA (PERCUTANEOUS TRANSLUMINAL CORONARY ANGIOPLASTY): Primary | ICD-10-CM

## 2023-02-07 DIAGNOSIS — I25.10 CAD (CORONARY ARTERY DISEASE): ICD-10-CM

## 2023-02-08 ENCOUNTER — CARDPULM VISIT (OUTPATIENT)
Dept: CARDIAC REHAB | Facility: HOSPITAL | Age: 73
End: 2023-02-08
Attending: INTERNAL MEDICINE
Payer: MEDICARE

## 2023-02-08 PROCEDURE — 93798 PHYS/QHP OP CAR RHAB W/ECG: CPT

## 2023-02-09 ENCOUNTER — CARDPULM VISIT (OUTPATIENT)
Dept: CARDIAC REHAB | Facility: HOSPITAL | Age: 73
End: 2023-02-09
Attending: INTERNAL MEDICINE
Payer: MEDICARE

## 2023-02-09 PROCEDURE — 93798 PHYS/QHP OP CAR RHAB W/ECG: CPT

## 2023-02-13 ENCOUNTER — CARDPULM VISIT (OUTPATIENT)
Dept: CARDIAC REHAB | Facility: HOSPITAL | Age: 73
End: 2023-02-13
Attending: INTERNAL MEDICINE
Payer: MEDICARE

## 2023-02-13 PROCEDURE — 93798 PHYS/QHP OP CAR RHAB W/ECG: CPT

## 2023-02-15 ENCOUNTER — APPOINTMENT (OUTPATIENT)
Dept: CARDIAC REHAB | Facility: HOSPITAL | Age: 73
End: 2023-02-15
Attending: INTERNAL MEDICINE
Payer: MEDICARE

## 2023-02-15 LAB
GLUCOSE BLDC GLUCOMTR-MCNC: 88 MG/DL (ref 70–99)
GLUCOSE BLDC GLUCOMTR-MCNC: 97 MG/DL (ref 70–99)

## 2023-02-16 ENCOUNTER — CARDPULM VISIT (OUTPATIENT)
Dept: CARDIAC REHAB | Facility: HOSPITAL | Age: 73
End: 2023-02-16
Attending: INTERNAL MEDICINE
Payer: MEDICARE

## 2023-02-16 PROCEDURE — 93798 PHYS/QHP OP CAR RHAB W/ECG: CPT

## 2023-02-20 ENCOUNTER — APPOINTMENT (OUTPATIENT)
Dept: CARDIAC REHAB | Facility: HOSPITAL | Age: 73
End: 2023-02-20
Attending: INTERNAL MEDICINE
Payer: MEDICARE

## 2023-02-21 PROCEDURE — 93798 PHYS/QHP OP CAR RHAB W/ECG: CPT

## 2023-02-22 ENCOUNTER — CARDPULM VISIT (OUTPATIENT)
Dept: CARDIAC REHAB | Facility: HOSPITAL | Age: 73
End: 2023-02-22
Attending: INTERNAL MEDICINE
Payer: MEDICARE

## 2023-02-23 ENCOUNTER — CARDPULM VISIT (OUTPATIENT)
Dept: CARDIAC REHAB | Facility: HOSPITAL | Age: 73
End: 2023-02-23
Attending: INTERNAL MEDICINE
Payer: MEDICARE

## 2023-02-23 PROCEDURE — 93798 PHYS/QHP OP CAR RHAB W/ECG: CPT

## 2023-02-27 ENCOUNTER — CARDPULM VISIT (OUTPATIENT)
Dept: CARDIAC REHAB | Facility: HOSPITAL | Age: 73
End: 2023-02-27
Attending: INTERNAL MEDICINE
Payer: MEDICARE

## 2023-02-28 PROCEDURE — 93798 PHYS/QHP OP CAR RHAB W/ECG: CPT

## 2023-03-01 ENCOUNTER — CARDPULM VISIT (OUTPATIENT)
Dept: CARDIAC REHAB | Facility: HOSPITAL | Age: 73
End: 2023-03-01
Attending: INTERNAL MEDICINE
Payer: MEDICARE

## 2023-03-01 PROCEDURE — 93798 PHYS/QHP OP CAR RHAB W/ECG: CPT

## 2023-03-02 ENCOUNTER — CARDPULM VISIT (OUTPATIENT)
Dept: CARDIAC REHAB | Facility: HOSPITAL | Age: 73
End: 2023-03-02
Attending: INTERNAL MEDICINE
Payer: MEDICARE

## 2023-03-02 PROCEDURE — 93798 PHYS/QHP OP CAR RHAB W/ECG: CPT

## 2023-03-06 ENCOUNTER — CARDPULM VISIT (OUTPATIENT)
Dept: CARDIAC REHAB | Facility: HOSPITAL | Age: 73
End: 2023-03-06
Attending: INTERNAL MEDICINE
Payer: MEDICARE

## 2023-03-06 PROCEDURE — 93798 PHYS/QHP OP CAR RHAB W/ECG: CPT

## 2023-03-08 ENCOUNTER — APPOINTMENT (OUTPATIENT)
Dept: CARDIAC REHAB | Facility: HOSPITAL | Age: 73
End: 2023-03-08
Attending: INTERNAL MEDICINE
Payer: MEDICARE

## 2023-03-09 ENCOUNTER — CARDPULM VISIT (OUTPATIENT)
Dept: CARDIAC REHAB | Facility: HOSPITAL | Age: 73
End: 2023-03-09
Attending: INTERNAL MEDICINE
Payer: MEDICARE

## 2023-03-09 PROCEDURE — 93798 PHYS/QHP OP CAR RHAB W/ECG: CPT

## 2023-03-13 ENCOUNTER — CARDPULM VISIT (OUTPATIENT)
Dept: CARDIAC REHAB | Facility: HOSPITAL | Age: 73
End: 2023-03-13
Attending: INTERNAL MEDICINE
Payer: MEDICARE

## 2023-03-14 PROCEDURE — 93798 PHYS/QHP OP CAR RHAB W/ECG: CPT

## 2023-03-15 ENCOUNTER — CARDPULM VISIT (OUTPATIENT)
Dept: CARDIAC REHAB | Facility: HOSPITAL | Age: 73
End: 2023-03-15
Attending: INTERNAL MEDICINE
Payer: MEDICARE

## 2023-03-15 PROCEDURE — 93798 PHYS/QHP OP CAR RHAB W/ECG: CPT

## 2023-03-16 ENCOUNTER — CARDPULM VISIT (OUTPATIENT)
Dept: CARDIAC REHAB | Facility: HOSPITAL | Age: 73
End: 2023-03-16
Attending: INTERNAL MEDICINE
Payer: MEDICARE

## 2023-03-16 PROCEDURE — 93798 PHYS/QHP OP CAR RHAB W/ECG: CPT

## 2023-03-20 ENCOUNTER — CARDPULM VISIT (OUTPATIENT)
Dept: CARDIAC REHAB | Facility: HOSPITAL | Age: 73
End: 2023-03-20
Attending: INTERNAL MEDICINE
Payer: MEDICARE

## 2023-03-21 PROCEDURE — 93798 PHYS/QHP OP CAR RHAB W/ECG: CPT

## 2023-03-22 ENCOUNTER — CARDPULM VISIT (OUTPATIENT)
Dept: CARDIAC REHAB | Facility: HOSPITAL | Age: 73
End: 2023-03-22
Attending: INTERNAL MEDICINE
Payer: MEDICARE

## 2023-03-22 PROCEDURE — 93798 PHYS/QHP OP CAR RHAB W/ECG: CPT

## 2023-03-23 ENCOUNTER — CARDPULM VISIT (OUTPATIENT)
Dept: CARDIAC REHAB | Facility: HOSPITAL | Age: 73
End: 2023-03-23
Attending: INTERNAL MEDICINE
Payer: MEDICARE

## 2023-03-23 PROCEDURE — 93798 PHYS/QHP OP CAR RHAB W/ECG: CPT

## 2023-03-27 ENCOUNTER — CARDPULM VISIT (OUTPATIENT)
Dept: CARDIAC REHAB | Facility: HOSPITAL | Age: 73
End: 2023-03-27
Attending: INTERNAL MEDICINE
Payer: MEDICARE

## 2023-03-27 PROCEDURE — 93798 PHYS/QHP OP CAR RHAB W/ECG: CPT

## 2023-03-29 ENCOUNTER — CARDPULM VISIT (OUTPATIENT)
Dept: CARDIAC REHAB | Facility: HOSPITAL | Age: 73
End: 2023-03-29
Attending: INTERNAL MEDICINE
Payer: MEDICARE

## 2023-03-29 PROCEDURE — 93798 PHYS/QHP OP CAR RHAB W/ECG: CPT

## 2023-04-03 ENCOUNTER — CARDPULM VISIT (OUTPATIENT)
Dept: CARDIAC REHAB | Facility: HOSPITAL | Age: 73
End: 2023-04-03
Attending: INTERNAL MEDICINE
Payer: MEDICARE

## 2023-04-03 PROCEDURE — 93798 PHYS/QHP OP CAR RHAB W/ECG: CPT

## 2023-04-05 ENCOUNTER — APPOINTMENT (OUTPATIENT)
Dept: CARDIAC REHAB | Facility: HOSPITAL | Age: 73
End: 2023-04-05
Attending: INTERNAL MEDICINE
Payer: MEDICARE

## 2023-04-06 ENCOUNTER — CARDPULM VISIT (OUTPATIENT)
Dept: CARDIAC REHAB | Facility: HOSPITAL | Age: 73
End: 2023-04-06
Attending: INTERNAL MEDICINE
Payer: MEDICARE

## 2023-04-07 PROCEDURE — 93798 PHYS/QHP OP CAR RHAB W/ECG: CPT

## 2023-04-10 ENCOUNTER — APPOINTMENT (OUTPATIENT)
Dept: CARDIAC REHAB | Facility: HOSPITAL | Age: 73
End: 2023-04-10
Attending: INTERNAL MEDICINE
Payer: MEDICARE

## 2023-04-12 ENCOUNTER — CARDPULM VISIT (OUTPATIENT)
Dept: CARDIAC REHAB | Facility: HOSPITAL | Age: 73
End: 2023-04-12
Attending: INTERNAL MEDICINE
Payer: MEDICARE

## 2023-04-12 PROCEDURE — 93798 PHYS/QHP OP CAR RHAB W/ECG: CPT

## 2023-04-13 ENCOUNTER — CARDPULM VISIT (OUTPATIENT)
Dept: CARDIAC REHAB | Facility: HOSPITAL | Age: 73
End: 2023-04-13
Attending: INTERNAL MEDICINE
Payer: MEDICARE

## 2023-04-13 PROCEDURE — 93798 PHYS/QHP OP CAR RHAB W/ECG: CPT

## 2023-04-17 ENCOUNTER — CARDPULM VISIT (OUTPATIENT)
Dept: CARDIAC REHAB | Facility: HOSPITAL | Age: 73
End: 2023-04-17
Attending: INTERNAL MEDICINE
Payer: MEDICARE

## 2023-04-17 PROCEDURE — 93798 PHYS/QHP OP CAR RHAB W/ECG: CPT

## 2023-04-19 ENCOUNTER — CARDPULM VISIT (OUTPATIENT)
Dept: CARDIAC REHAB | Facility: HOSPITAL | Age: 73
End: 2023-04-19
Attending: INTERNAL MEDICINE
Payer: MEDICARE

## 2023-04-19 PROCEDURE — 93798 PHYS/QHP OP CAR RHAB W/ECG: CPT

## 2023-04-20 ENCOUNTER — CARDPULM VISIT (OUTPATIENT)
Dept: CARDIAC REHAB | Facility: HOSPITAL | Age: 73
End: 2023-04-20
Attending: INTERNAL MEDICINE
Payer: MEDICARE

## 2023-04-21 PROCEDURE — 93798 PHYS/QHP OP CAR RHAB W/ECG: CPT

## 2023-04-24 ENCOUNTER — CARDPULM VISIT (OUTPATIENT)
Dept: CARDIAC REHAB | Facility: HOSPITAL | Age: 73
End: 2023-04-24
Attending: INTERNAL MEDICINE
Payer: MEDICARE

## 2023-04-24 PROCEDURE — 93798 PHYS/QHP OP CAR RHAB W/ECG: CPT

## 2023-04-26 ENCOUNTER — CARDPULM VISIT (OUTPATIENT)
Dept: CARDIAC REHAB | Facility: HOSPITAL | Age: 73
End: 2023-04-26
Attending: INTERNAL MEDICINE
Payer: MEDICARE

## 2023-04-26 PROCEDURE — 93798 PHYS/QHP OP CAR RHAB W/ECG: CPT

## 2023-04-27 ENCOUNTER — CARDPULM VISIT (OUTPATIENT)
Dept: CARDIAC REHAB | Facility: HOSPITAL | Age: 73
End: 2023-04-27
Attending: INTERNAL MEDICINE
Payer: MEDICARE

## 2023-04-27 PROCEDURE — 93798 PHYS/QHP OP CAR RHAB W/ECG: CPT

## 2023-05-01 ENCOUNTER — CARDPULM VISIT (OUTPATIENT)
Dept: CARDIAC REHAB | Facility: HOSPITAL | Age: 73
End: 2023-05-01
Attending: INTERNAL MEDICINE
Payer: MEDICARE

## 2023-05-01 PROCEDURE — 93798 PHYS/QHP OP CAR RHAB W/ECG: CPT

## 2023-05-03 ENCOUNTER — APPOINTMENT (OUTPATIENT)
Dept: CARDIAC REHAB | Facility: HOSPITAL | Age: 73
End: 2023-05-03
Attending: INTERNAL MEDICINE
Payer: MEDICARE

## 2023-05-04 ENCOUNTER — APPOINTMENT (OUTPATIENT)
Dept: CARDIAC REHAB | Facility: HOSPITAL | Age: 73
End: 2023-05-04
Attending: INTERNAL MEDICINE
Payer: MEDICARE

## 2023-05-08 ENCOUNTER — APPOINTMENT (OUTPATIENT)
Dept: CARDIAC REHAB | Facility: HOSPITAL | Age: 73
End: 2023-05-08
Attending: INTERNAL MEDICINE
Payer: MEDICARE

## 2023-05-10 ENCOUNTER — APPOINTMENT (OUTPATIENT)
Dept: CARDIAC REHAB | Facility: HOSPITAL | Age: 73
End: 2023-05-10
Attending: INTERNAL MEDICINE
Payer: MEDICARE

## 2023-05-11 ENCOUNTER — APPOINTMENT (OUTPATIENT)
Dept: CARDIAC REHAB | Facility: HOSPITAL | Age: 73
End: 2023-05-11
Attending: INTERNAL MEDICINE
Payer: MEDICARE

## 2023-05-15 ENCOUNTER — APPOINTMENT (OUTPATIENT)
Dept: CARDIAC REHAB | Facility: HOSPITAL | Age: 73
End: 2023-05-15
Attending: INTERNAL MEDICINE
Payer: MEDICARE

## 2023-05-17 ENCOUNTER — APPOINTMENT (OUTPATIENT)
Dept: CARDIAC REHAB | Facility: HOSPITAL | Age: 73
End: 2023-05-17
Attending: INTERNAL MEDICINE
Payer: MEDICARE

## 2023-05-18 ENCOUNTER — APPOINTMENT (OUTPATIENT)
Dept: CARDIAC REHAB | Facility: HOSPITAL | Age: 73
End: 2023-05-18
Attending: INTERNAL MEDICINE
Payer: MEDICARE

## 2023-05-22 ENCOUNTER — APPOINTMENT (OUTPATIENT)
Dept: CARDIAC REHAB | Facility: HOSPITAL | Age: 73
End: 2023-05-22
Attending: INTERNAL MEDICINE
Payer: MEDICARE

## 2023-05-24 ENCOUNTER — APPOINTMENT (OUTPATIENT)
Dept: CARDIAC REHAB | Facility: HOSPITAL | Age: 73
End: 2023-05-24
Attending: INTERNAL MEDICINE
Payer: MEDICARE

## 2023-05-25 ENCOUNTER — APPOINTMENT (OUTPATIENT)
Dept: CARDIAC REHAB | Facility: HOSPITAL | Age: 73
End: 2023-05-25
Attending: INTERNAL MEDICINE
Payer: MEDICARE

## 2023-05-29 ENCOUNTER — APPOINTMENT (OUTPATIENT)
Dept: CARDIAC REHAB | Facility: HOSPITAL | Age: 73
End: 2023-05-29
Attending: INTERNAL MEDICINE
Payer: MEDICARE

## 2023-05-31 ENCOUNTER — APPOINTMENT (OUTPATIENT)
Dept: CARDIAC REHAB | Facility: HOSPITAL | Age: 73
End: 2023-05-31
Attending: INTERNAL MEDICINE
Payer: MEDICARE

## 2023-12-08 ENCOUNTER — LAB ENCOUNTER (OUTPATIENT)
Dept: LAB | Facility: HOSPITAL | Age: 73
End: 2023-12-08
Attending: INTERNAL MEDICINE
Payer: MEDICARE

## 2023-12-08 DIAGNOSIS — I25.10 CORONARY ATHEROSCLEROSIS OF NATIVE CORONARY ARTERY: ICD-10-CM

## 2023-12-08 DIAGNOSIS — R06.02 SHORTNESS OF BREATH: ICD-10-CM

## 2023-12-08 DIAGNOSIS — R94.39 ABNORMAL BALLISTOCARDIOGRAM: Primary | ICD-10-CM

## 2023-12-08 DIAGNOSIS — Z01.818 PREOPERATIVE EXAMINATION, UNSPECIFIED: ICD-10-CM

## 2023-12-08 LAB
ANION GAP SERPL CALC-SCNC: 5 MMOL/L (ref 0–18)
BASOPHILS # BLD AUTO: 0.04 X10(3) UL (ref 0–0.2)
BASOPHILS NFR BLD AUTO: 0.5 %
BUN BLD-MCNC: 18 MG/DL (ref 9–23)
BUN/CREAT SERPL: 18.9 (ref 10–20)
CALCIUM BLD-MCNC: 9.5 MG/DL (ref 8.7–10.4)
CHLORIDE SERPL-SCNC: 102 MMOL/L (ref 98–112)
CO2 SERPL-SCNC: 29 MMOL/L (ref 21–32)
CREAT BLD-MCNC: 0.95 MG/DL
DEPRECATED RDW RBC AUTO: 40.7 FL (ref 35.1–46.3)
EGFRCR SERPLBLD CKD-EPI 2021: 84 ML/MIN/1.73M2 (ref 60–?)
EOSINOPHIL # BLD AUTO: 0.16 X10(3) UL (ref 0–0.7)
EOSINOPHIL NFR BLD AUTO: 2.2 %
ERYTHROCYTE [DISTWIDTH] IN BLOOD BY AUTOMATED COUNT: 11.7 % (ref 11–15)
FASTING STATUS PATIENT QL REPORTED: NO
GLUCOSE BLD-MCNC: 163 MG/DL (ref 70–99)
HCT VFR BLD AUTO: 44.5 %
HGB BLD-MCNC: 14.9 G/DL
IMM GRANULOCYTES # BLD AUTO: 0.03 X10(3) UL (ref 0–1)
IMM GRANULOCYTES NFR BLD: 0.4 %
LYMPHOCYTES # BLD AUTO: 2.18 X10(3) UL (ref 1–4)
LYMPHOCYTES NFR BLD AUTO: 29.7 %
MCH RBC QN AUTO: 32 PG (ref 26–34)
MCHC RBC AUTO-ENTMCNC: 33.5 G/DL (ref 31–37)
MCV RBC AUTO: 95.7 FL
MONOCYTES # BLD AUTO: 0.47 X10(3) UL (ref 0.1–1)
MONOCYTES NFR BLD AUTO: 6.4 %
NEUTROPHILS # BLD AUTO: 4.47 X10 (3) UL (ref 1.5–7.7)
NEUTROPHILS # BLD AUTO: 4.47 X10(3) UL (ref 1.5–7.7)
NEUTROPHILS NFR BLD AUTO: 60.8 %
OSMOLALITY SERPL CALC.SUM OF ELEC: 287 MOSM/KG (ref 275–295)
PLATELET # BLD AUTO: 218 10(3)UL (ref 150–450)
POTASSIUM SERPL-SCNC: 4.5 MMOL/L (ref 3.5–5.1)
RBC # BLD AUTO: 4.65 X10(6)UL
SODIUM SERPL-SCNC: 136 MMOL/L (ref 136–145)
WBC # BLD AUTO: 7.4 X10(3) UL (ref 4–11)

## 2023-12-08 PROCEDURE — 36415 COLL VENOUS BLD VENIPUNCTURE: CPT

## 2023-12-08 PROCEDURE — 80048 BASIC METABOLIC PNL TOTAL CA: CPT

## 2023-12-08 PROCEDURE — 85025 COMPLETE CBC W/AUTO DIFF WBC: CPT

## 2023-12-19 ENCOUNTER — HOSPITAL ENCOUNTER (OUTPATIENT)
Dept: INTERVENTIONAL RADIOLOGY/VASCULAR | Facility: HOSPITAL | Age: 73
Discharge: HOME | End: 2023-12-19
Attending: INTERNAL MEDICINE | Admitting: INTERNAL MEDICINE
Payer: MEDICARE

## 2023-12-19 VITALS
BODY MASS INDEX: 30.48 KG/M2 | RESPIRATION RATE: 15 BRPM | HEIGHT: 72 IN | TEMPERATURE: 97 F | OXYGEN SATURATION: 98 % | HEART RATE: 74 BPM | SYSTOLIC BLOOD PRESSURE: 154 MMHG | WEIGHT: 225 LBS | DIASTOLIC BLOOD PRESSURE: 83 MMHG

## 2023-12-19 DIAGNOSIS — I25.10 CORONARY DISEASE: ICD-10-CM

## 2023-12-19 DIAGNOSIS — R94.39 ABNORMAL STRESS TEST: ICD-10-CM

## 2023-12-19 LAB
GLUCOSE BLDC GLUCOMTR-MCNC: 152 MG/DL (ref 70–99)
ISTAT ACTIVATED CLOTTING TIME: 255 SECONDS (ref 125–137)

## 2023-12-19 PROCEDURE — 82962 GLUCOSE BLOOD TEST: CPT

## 2023-12-19 PROCEDURE — 36415 COLL VENOUS BLD VENIPUNCTURE: CPT

## 2023-12-19 PROCEDURE — B2111ZZ FLUOROSCOPY OF MULTIPLE CORONARY ARTERIES USING LOW OSMOLAR CONTRAST: ICD-10-PCS | Performed by: INTERNAL MEDICINE

## 2023-12-19 PROCEDURE — 93799 UNLISTED CV SVC/PROCEDURE: CPT | Performed by: INTERNAL MEDICINE

## 2023-12-19 PROCEDURE — 99152 MOD SED SAME PHYS/QHP 5/>YRS: CPT | Performed by: INTERNAL MEDICINE

## 2023-12-19 PROCEDURE — 4A033BC MEASUREMENT OF ARTERIAL PRESSURE, CORONARY, PERCUTANEOUS APPROACH: ICD-10-PCS | Performed by: INTERNAL MEDICINE

## 2023-12-19 PROCEDURE — 99153 MOD SED SAME PHYS/QHP EA: CPT | Performed by: INTERNAL MEDICINE

## 2023-12-19 PROCEDURE — 027034Z DILATION OF CORONARY ARTERY, ONE ARTERY WITH DRUG-ELUTING INTRALUMINAL DEVICE, PERCUTANEOUS APPROACH: ICD-10-PCS | Performed by: INTERNAL MEDICINE

## 2023-12-19 PROCEDURE — 85347 COAGULATION TIME ACTIVATED: CPT

## 2023-12-19 PROCEDURE — 93454 CORONARY ARTERY ANGIO S&I: CPT | Performed by: INTERNAL MEDICINE

## 2023-12-19 RX ORDER — ACETAMINOPHEN 325 MG/1
TABLET ORAL
Status: COMPLETED
Start: 2023-12-19 | End: 2023-12-19

## 2023-12-19 RX ORDER — ACETAMINOPHEN 325 MG/1
650 TABLET ORAL ONCE
Status: COMPLETED | OUTPATIENT
Start: 2023-12-19 | End: 2023-12-19

## 2023-12-19 RX ORDER — ASPIRIN 81 MG/1
324 TABLET, CHEWABLE ORAL ONCE
Status: DISCONTINUED | OUTPATIENT
Start: 2023-12-19 | End: 2023-12-19

## 2023-12-19 RX ORDER — CHLORHEXIDINE GLUCONATE 4 G/100ML
30 SOLUTION TOPICAL
Status: DISCONTINUED | OUTPATIENT
Start: 2023-12-19 | End: 2023-12-19

## 2023-12-19 RX ORDER — ASPIRIN 81 MG/1
81 TABLET ORAL DAILY
Status: DISCONTINUED | OUTPATIENT
Start: 2023-12-20 | End: 2023-12-19

## 2023-12-19 RX ORDER — LIDOCAINE HYDROCHLORIDE 20 MG/ML
INJECTION, SOLUTION EPIDURAL; INFILTRATION; INTRACAUDAL; PERINEURAL
Status: COMPLETED
Start: 2023-12-19 | End: 2023-12-19

## 2023-12-19 RX ORDER — CLOPIDOGREL BISULFATE 75 MG/1
TABLET ORAL
Status: COMPLETED
Start: 2023-12-19 | End: 2023-12-19

## 2023-12-19 RX ORDER — MIDAZOLAM HYDROCHLORIDE 1 MG/ML
INJECTION INTRAMUSCULAR; INTRAVENOUS
Status: COMPLETED
Start: 2023-12-19 | End: 2023-12-19

## 2023-12-19 RX ORDER — HEPARIN SODIUM 1000 [USP'U]/ML
INJECTION, SOLUTION INTRAVENOUS; SUBCUTANEOUS
Status: COMPLETED
Start: 2023-12-19 | End: 2023-12-19

## 2023-12-19 RX ORDER — CLOPIDOGREL BISULFATE 75 MG/1
75 TABLET ORAL DAILY
Status: DISCONTINUED | OUTPATIENT
Start: 2023-12-20 | End: 2023-12-19

## 2023-12-19 RX ORDER — SODIUM CHLORIDE 9 MG/ML
3 INJECTION, SOLUTION INTRAVENOUS
Status: COMPLETED | OUTPATIENT
Start: 2023-12-19 | End: 2023-12-19

## 2023-12-19 RX ADMIN — SODIUM CHLORIDE 3 ML/KG/HR: 9 INJECTION, SOLUTION INTRAVENOUS at 09:30:00

## 2023-12-19 RX ADMIN — ACETAMINOPHEN 650 MG: 325 TABLET ORAL at 15:00:00

## 2023-12-19 NOTE — PLAN OF CARE
Patient had cath through right groin today. Procedure completed around 1400. Post cath assessments completed. Patient supine for three hours and slowly sitting up. Cardiac diet. Discharge order in place for 2000, wife received discharge paperwork from cath RN. Patient able to sit up and walk around at 1800. Problem: CARDIOVASCULAR - ADULT  Goal: Maintains optimal cardiac output and hemodynamic stability  Description: INTERVENTIONS:  - Monitor vital signs, rhythm, and trends  - Monitor for bleeding, hypotension and signs of decreased cardiac output  - Evaluate effectiveness of vasoactive medications to optimize hemodynamic stability  - Monitor arterial and/or venous puncture sites for bleeding and/or hematoma  - Assess quality of pulses, skin color and temperature  - Assess for signs of decreased coronary artery perfusion - ex.  Angina  - Evaluate fluid balance, assess for edema, trend weights  Outcome: Progressing  Goal: Absence of cardiac arrhythmias or at baseline  Description: INTERVENTIONS:  - Continuous cardiac monitoring, monitor vital signs, obtain 12 lead EKG if indicated  - Evaluate effectiveness of antiarrhythmic and heart rate control medications as ordered  - Initiate emergency measures for life threatening arrhythmias  - Monitor electrolytes and administer replacement therapy as ordered  Outcome: Progressing     Problem: HEMATOLOGIC - ADULT  Goal: Maintains hematologic stability  Description: INTERVENTIONS  - Assess for signs and symptoms of bleeding or hemorrhage  - Monitor labs and vital signs for trends  - Administer supportive blood products/factors, fluids and medications as ordered and appropriate  - Administer supportive blood products/factors as ordered and appropriate  Outcome: Progressing  Goal: Free from bleeding injury  Description: (Example usage: patient with low platelets)  INTERVENTIONS:  - Avoid intramuscular injections, enemas and rectal medication administration  - Ensure safe mobilization of patient  - Hold pressure on venipuncture sites to achieve adequate hemostasis  - Assess for signs and symptoms of internal bleeding  - Monitor lab trends  - Patient is to report abnormal signs of bleeding to staff  - Avoid use of toothpicks and dental floss  - Use electric shaver for shaving  - Use soft bristle tooth brush  - Limit straining and forceful nose blowing  Outcome: Progressing     Problem: Impaired Functional Mobility  Goal: Achieve highest/safest level of mobility/gait  Description: Interventions:  - Assess patient's functional ability and stability  - Promote increasing activity/tolerance for mobility and gait  - Educate and engage patient/family in tolerated activity level and precautions    Outcome: Progressing     Problem: PAIN - ADULT  Goal: Verbalizes/displays adequate comfort level or patient's stated pain goal  Description: INTERVENTIONS:  - Encourage pt to monitor pain and request assistance  - Assess pain using appropriate pain scale  - Administer analgesics based on type and severity of pain and evaluate response  - Implement non-pharmacological measures as appropriate and evaluate response  - Consider cultural and social influences on pain and pain management  - Manage/alleviate anxiety  - Utilize distraction and/or relaxation techniques  - Monitor for opioid side effects  - Notify MD/LIP if interventions unsuccessful or patient reports new pain  - Anticipate increased pain with activity and pre-medicate as appropriate  Outcome: Progressing     Problem: SAFETY ADULT - FALL  Goal: Free from fall injury  Description: INTERVENTIONS:  - Assess pt frequently for physical needs  - Identify cognitive and physical deficits and behaviors that affect risk of falls.   - [employer] fall precautions as indicated by assessment.  - Educate pt/family on patient safety including physical limitations  - Instruct pt to call for assistance with activity based on assessment  - Modify environment to reduce risk of injury  - Provide assistive devices as appropriate  - Consider OT/PT consult to assist with strengthening/mobility  - Encourage toileting schedule  Outcome: Progressing     Problem: DISCHARGE PLANNING  Goal: Discharge to home or other facility with appropriate resources  Description: INTERVENTIONS:  - Identify barriers to discharge w/pt and caregiver  - Include patient/family/discharge partner in discharge planning  - Arrange for needed discharge resources and transportation as appropriate  - Identify discharge learning needs (meds, wound care, etc)  - Arrange for interpreters to assist at discharge as needed  - Consider post-discharge preferences of patient/family/discharge partner  - Complete POLST form as appropriate  - Assess patient's ability to be responsible for managing their own health  - Refer to Case Management Department for coordinating discharge planning if the patient needs post-hospital services based on physician/LIP order or complex needs related to functional status, cognitive ability or social support system  Outcome: Progressing

## 2023-12-19 NOTE — INTERVAL H&P NOTE
Pre-op Diagnosis: * No pre-op diagnosis entered *    The above referenced H&P was reviewed by Mick Gardner MD on 12/19/2023, the patient was examined and no significant changes have occurred in the patient's condition since the H&P was performed. I discussed with the patient and/or legal representative the potential benefits, risks and side effects of this procedure; the likelihood of the patient achieving goals; and potential problems that might occur during recuperation. I discussed reasonable alternatives to the procedure, including risks, benefits and side effects related to the alternatives and risks related to not receiving this procedure. We will proceed with procedure as planned.

## 2023-12-19 NOTE — CARDIAC REHAB
Cardiac Rehab Phase I    Activity:  Distance   Assistance needed   Patient tolerated activity . Education:  Handouts provided and reviewed: [address] Diet: Healthy Cardiac diet reviewed. Disease Process: Disease process reviewed.     Reviewed the following:       RISK FACTORS: Reviewed      SMOKING CESSATION: Reviewed      HOME EXERCISE ACTIVITY: Reviewed      OUTPATIENT CARDIAC REHAB: Referred to Cardiac Rehabilitation

## 2023-12-19 NOTE — PROCEDURES
[facility]    Cardiac Cath Procedure Note    [facility]:  [facility]    1950 MRN [id number]   Location [facility] Attending Ashley Ch MD   Hosp Day # 0 PCP Eliana Jose MD       Cardiologist: Helen Jang MD  Primary Proceduralist: Helen Jang MD  Procedure Performed: LHC, RHC, LV, and FFR and PCI LAD  Date of Procedure: 2023   Indication: Abnormal stress test    Summary of procedure:    Successful PCI LAD with JIMMY x 1, FFR positive lesion      Recommendations:  DAPT: Aspirin and Plavix  Observe for 6 hours discharge home      Left Ventriculography and hemodynamics: Aortic valve not crossed      Coronary Angiography  RCA: Nonobstructive stent in the distal segment. Dominant and free of obstructive disease, supplies PDA and PL    Left main:  Free of obstructive disease    Left anterior descending: Severely calcified in the mid segment, tortuous, eccentric disease, angiographically 70 to 80%. Distal to the focal stenosis there is a tubular 60% stenosis. Remainder of the LAD is free of obstructive disease, supplies multiple diagonals which are non-obstructive    Circumflex: Patent OM stent. Circumflex is free of obstructive disease, supplies multiple OM branches which are patent       LAD intervention  Lesion Characteristics-not torturous, moderately calcified. Type non-C lesion. Pre-intervention stenosis 80%, Post intervention stenosis 0%. Pre ELLA 3, Post ELLA 3.      Guide Catheter: EBU 3.75  Wire: Comet 2 FFR wire zeroed outside the body, equalized in the left main, advanced across the lesion, resting gradient 0.7  Pre-dil: None  Stent: 3.5 x 24 mm Synergy JIMMY at 16 jaden  Post-dil: None        Description of Procedure:   After written informed consent was obtained from the patient, patient was brought to the cardiac catheterization laboratory. Patient was prepped and draped in the usual sterile fashion.  Lidocaine 1% was used to infiltrate the right groin for local anesthesia and a 6 [place] introducer sheath was inserted into the right femoral artery via ultrasound guidance and micropuncture kit. Selective coronary angiography performed with JR4 catheter for RCA and JL4 catheter for LCA. Angiography performed in standard projections. Selective right femoral angiogram done assess anatomy for closure. Specimen sent to: No specimen collected  Estimated blood loss: 10 cc  Closure:  Perclose       IV was maintained by RN and moderate conscious sedation of versed and fentanyl was given. Patient was assessed and monitoring of oxygen, heart rate and blood pressure by nurse and myself during the exam 35 minutes.       Yobany Denise MD  12/19/23

## 2023-12-19 NOTE — DIETARY NOTE
NUTRITION EDUCATION NOTE     Received consult for cardiac nutrition education. Verbally reviewed basic cardiac diet restrictions. Provided with Eating Heart-Healthy and NCM handout to reinforce. Receptive to instruction. Would benefit from outpt f/u. Expect fair compliance.         Raymundo  FAIZA, CORTES  Clinical Dietitian  P: [phone]

## 2023-12-20 NOTE — PLAN OF CARE
Problem: CARDIOVASCULAR - ADULT  Goal: Maintains optimal cardiac output and hemodynamic stability  Description: INTERVENTIONS:  - Monitor vital signs, rhythm, and trends  - Monitor for bleeding, hypotension and signs of decreased cardiac output  - Evaluate effectiveness of vasoactive medications to optimize hemodynamic stability  - Monitor arterial and/or venous puncture sites for bleeding and/or hematoma  - Assess quality of pulses, skin color and temperature  - Assess for signs of decreased coronary artery perfusion - ex.  Angina  - Evaluate fluid balance, assess for edema, trend weights  Outcome: Adequate for Discharge  Goal: Absence of cardiac arrhythmias or at baseline  Description: INTERVENTIONS:  - Continuous cardiac monitoring, monitor vital signs, obtain 12 lead EKG if indicated  - Evaluate effectiveness of antiarrhythmic and heart rate control medications as ordered  - Initiate emergency measures for life threatening arrhythmias  - Monitor electrolytes and administer replacement therapy as ordered  Outcome: Adequate for Discharge     Problem: HEMATOLOGIC - ADULT  Goal: Maintains hematologic stability  Description: INTERVENTIONS  - Assess for signs and symptoms of bleeding or hemorrhage  - Monitor labs and vital signs for trends  - Administer supportive blood products/factors, fluids and medications as ordered and appropriate  - Administer supportive blood products/factors as ordered and appropriate  Outcome: Adequate for Discharge  Goal: Free from bleeding injury  Description: (Example usage: patient with low platelets)  INTERVENTIONS:  - Avoid intramuscular injections, enemas and rectal medication administration  - Ensure safe mobilization of patient  - Hold pressure on venipuncture sites to achieve adequate hemostasis  - Assess for signs and symptoms of internal bleeding  - Monitor lab trends  - Patient is to report abnormal signs of bleeding to staff  - Avoid use of toothpicks and dental floss  - Use electric shaver for shaving  - Use soft bristle tooth brush  - Limit straining and forceful nose blowing  Outcome: Adequate for Discharge  Problem: SAFETY ADULT - FALL  Goal: Free from fall injury  Description: INTERVENTIONS:  - Assess pt frequently for physical needs  - Identify cognitive and physical deficits and behaviors that affect risk of falls.   - [place] fall precautions as indicated by assessment.  - Educate pt/family on patient safety including physical limitations  - Instruct pt to call for assistance with activity based on assessment  - Modify environment to reduce risk of injury  - Provide assistive devices as appropriate  - Consider OT/PT consult to assist with strengthening/mobility  - Encourage toileting schedule  Outcome: Adequate for Discharge     Problem: DISCHARGE PLANNING  Goal: Discharge to home or other facility with appropriate resources  Description: INTERVENTIONS:  - Identify barriers to discharge w/pt and caregiver  - Include patient/family/discharge partner in discharge planning  - Arrange for needed discharge resources and transportation as appropriate  - Identify discharge learning needs (meds, wound care, etc)  - Arrange for interpreters to assist at discharge as needed  - Consider post-discharge preferences of patient/family/discharge partner  - Complete POLST form as appropriate  - Assess patient's ability to be responsible for managing their own health  - Refer to Case Management Department for coordinating discharge planning if the patient needs post-hospital services based on physician/LIP order or complex needs related to functional status, cognitive ability or social support system  Outcome: Adequate for Discharge        Problem: PAIN - ADULT  Goal: Verbalizes/displays adequate comfort level or patient's stated pain goal  Description: INTERVENTIONS:  - Encourage pt to monitor pain and request assistance  - Assess pain using appropriate pain scale  - Administer analgesics based on type and severity of pain and evaluate response  - Implement non-pharmacological measures as appropriate and evaluate response  - Consider cultural and social influences on pain and pain management  - Manage/alleviate anxiety  - Utilize distraction and/or relaxation techniques  - Monitor for opioid side effects  - Notify MD/LIP if interventions unsuccessful or patient reports new pain  - Anticipate increased pain with activity and pre-medicate as appropriate  Outcome: Adequate for Discharge

## 2024-02-01 ENCOUNTER — OFFICE VISIT (OUTPATIENT)
Dept: OPHTHALMOLOGY | Facility: CLINIC | Age: 74
End: 2024-02-01
Payer: MEDICARE

## 2024-02-01 DIAGNOSIS — E11.9 DIABETES MELLITUS TYPE 2 WITHOUT RETINOPATHY (HCC): Primary | ICD-10-CM

## 2024-02-01 DIAGNOSIS — H25.13 AGE-RELATED NUCLEAR CATARACT OF BOTH EYES: ICD-10-CM

## 2024-02-01 DIAGNOSIS — H40.003 GLAUCOMA SUSPECT OF BOTH EYES: ICD-10-CM

## 2024-02-01 DIAGNOSIS — H53.001 AMBLYOPIA, RIGHT: ICD-10-CM

## 2024-02-01 DIAGNOSIS — H43.393 VITREOUS FLOATERS OF BOTH EYES: ICD-10-CM

## 2024-02-01 PROCEDURE — 92014 COMPRE OPH EXAM EST PT 1/>: CPT | Performed by: OPHTHALMOLOGY

## 2024-02-01 NOTE — ASSESSMENT & PLAN NOTE
Diabetes type II: no background of retinopathy, no signs of neovascularization noted.  Discussed ocular and systemic benefits of blood sugar control.  Diagnosis and treatment discussed in detail with patient.    Will see patient in 1 year for a diabetic exam and photos

## 2024-02-01 NOTE — PROGRESS NOTES
Martin Finley is a 73 year old male.    HPI:     HPI    Patient is here for a diabetic exam.  Patient states his vision is good.  He has no ocular complaints.        Pt has been a diabetic for 23 years       Pt's diabetes is currently controlled by pills   Pt checks BS a few times per week.   Pt's last blood sugar was 125 on 1/31/24  Last HA1C was 6.2 on 9/27/23  Endocrinologist: none     Last edited by Claribel Allen OT on 2/1/2024  9:15 AM.        Patient History:  Past Medical History:   Diagnosis Date    Amblyopia      Right Eye     Anxiety     Cataracts, bilateral 2005    per NG    Cellulitis of left foot     Depression     Diabetes mellitus type 2 without retinopathy (HCC) 2013, 2010, 2005    per NG    History of hepatitis A virus infection 1961    per NG    MGD (meibomian gland dysfunction)     per NG    Other and unspecified hyperlipidemia     per NG    Type II or unspecified type diabetes mellitus without mention of complication, not stated as uncontrolled 2004    per NG    Unspecified essential hypertension     per NG    Vitreous floaters of right eye     per NG       Surgical History: Martin Finley has a past surgical history that includes back surgery and colonoscopy (10/2007).    Family History   Problem Relation Age of Onset    Other (Hernia complications) Father 50        Cause of death; per NG    Diabetes Sister         per NG    Glaucoma Neg     Macular degeneration Neg        Social History:   Social History     Socioeconomic History    Marital status:    Tobacco Use    Smoking status: Never    Smokeless tobacco: Never   Vaping Use    Vaping Use: Never used   Substance and Sexual Activity    Alcohol use: Yes     Alcohol/week: 0.0 standard drinks of alcohol     Comment: 1 beer/month    Drug use: No    Sexual activity: Not Currently     Partners: Female   Other Topics Concern    Caffeine Concern Yes     Comment: Coffee, 2 cups; per NG    Reaction to local anesthetic No        Medications:  Current Outpatient Medications   Medication Sig Dispense Refill    escitalopram 5 MG Oral Tab Take 1 tablet (5 mg total) by mouth daily.      rosuvastatin 20 MG Oral Tab Take 1 tablet (20 mg total) by mouth nightly. 90 tablet 3    clopidogrel 75 MG Oral Tab Take 1 tablet (75 mg total) by mouth daily. 90 tablet 3    glimepiride 1 MG Oral Tab Half a tablet daily with breakfast 90 tablet 2    allopurinol 100 MG Oral Tab Take 1 tablet by mouth once daily 90 tablet 2    metFORMIN HCl  MG Oral Tablet 24 Hr Take 1 tablet (750 mg total) by mouth 2 (two) times daily with meals. 180 tablet 2    Glucosamine-Chondroitin (GLUCOSAMINE CHONDR COMPLEX OR) Take by mouth.      Glucose Blood (Social Median CONTOUR TEST) In Vitro Strip USE ONE STRIP TO CHECK GLUCOSE TWICE DAILY 100 strip 3    omega-3 fatty acids 1000 MG Oral Cap Take 1,000 mg by mouth daily.      aspirin 81 MG Oral Tab EC Take by mouth at bedtime. take 1 tablet (81MG)  by oral route  every day      Cholecalciferol 50 MCG (2000 UT) Oral Tab Take  by mouth. take 1 by Oral route  every day         Allergies:  No Known Allergies    ROS:       PHYSICAL EXAM:     Base Eye Exam       Visual Acuity (Snellen - Linear)         Right Left    Dist cc 20/80 20/40    Dist ph cc 20/70 +2 20/30    Near cc 20/25 20/20      Correction: Glasses              Tonometry (Applanation, 9:25 AM)         Right Left    Pressure 18 18              Pachymetry (1/12/2017)         Right Left    Thickness 613/-5 611/-4.5              Pupils         Pupils    Right PERRL    Left PERRL              Visual Fields         Left Right     Full Full              Extraocular Movement         Right Left     Full, Ortho Full, Ortho              Neuro/Psych       Oriented x3: Yes              Dilation       Both eyes: 1.0% Mydriacyl and 2.5% Matheus Synephrine @ 9:26 AM                  Slit Lamp and Fundus Exam       Slit Lamp Exam         Right Left    Lids/Lashes Dermatochalasis, Meibomian  gland dysfunction Dermatochalasis, Meibomian gland dysfunction    Conjunctiva/Sclera Normal Normal    Cornea Clear Clear    Anterior Chamber Deep and quiet Deep and quiet    Iris Normal Normal    Lens 3+ Nuclear sclerosis 3+ Nuclear sclerosis    Vitreous Vitreous floaters Vitreous floaters              Fundus Exam         Right Left    Disc Sloping margin, Temporal crescent Sloping margin, Temporal crescent    C/D Ratio 0.8 0.65    Macula Normal- no BDR Normal- no BDR    Vessels Normal Normal    Periphery Normal Normal                  Refraction       Wearing Rx         Sphere Cylinder Axis Add    Right -3.75 +1.25 070 +3.00    Left -4.50 +1.25 110 +3.00      Type: Flat top bifocal              Manifest Refraction    Patient is happy with his glasses. He declines refraction.                    ASSESSMENT/PLAN:     Diagnoses and Plan:     Diabetes mellitus type 2 without retinopathy (HCC)  Diabetes type II: no background of retinopathy, no signs of neovascularization noted.  Discussed ocular and systemic benefits of blood sugar control.  Diagnosis and treatment discussed in detail with patient.    Will see patient in 1 year for a diabetic exam and photos    Glaucoma suspect of both eyes  Discussed with patient that he is a glaucoma suspect based on increased cupping of the optic nerves in both eyes.   Will not start medication, but will continue to observe.  Patient verbalized understanding.      Amblyopia, right  Longstanding; no treatment.     Vitreous floaters of both eyes   There is no evidence of retinal pathology.  All signs and symptoms of retinal detachment/tears explained in detail.    Patient instructed to call the office if they experience increase in floaters, increase in flashes of light, loss of vision or curtain or veil effect.       No orders of the defined types were placed in this encounter.      Meds This Visit:  Requested Prescriptions      No prescriptions requested or ordered in this encounter         Follow up instructions:  Return in about 1 year (around 2/1/2025) for Diabetic eye exam, Photos.    2/1/2024  Scribed by: Zhao Bhat MD

## 2024-02-01 NOTE — PATIENT INSTRUCTIONS
Diabetes mellitus type 2 without retinopathy (HCC)  Diabetes type II: no background of retinopathy, no signs of neovascularization noted.  Discussed ocular and systemic benefits of blood sugar control.  Diagnosis and treatment discussed in detail with patient.    Will see patient in 1 year for a diabetic exam and photos    Glaucoma suspect of both eyes  Discussed with patient that he is a glaucoma suspect based on increased cupping of the optic nerves in both eyes.   Will not start medication, but will continue to observe.  Patient verbalized understanding.      Amblyopia, right  Longstanding; no treatment.     Vitreous floaters of both eyes   There is no evidence of retinal pathology.  All signs and symptoms of retinal detachment/tears explained in detail.    Patient instructed to call the office if they experience increase in floaters, increase in flashes of light, loss of vision or curtain or veil effect.

## 2024-02-01 NOTE — ASSESSMENT & PLAN NOTE
Discussed with patient that he is a glaucoma suspect based on increased cupping of the optic nerves in both eyes.   Will not start medication, but will continue to observe.  Patient verbalized understanding.

## 2024-02-26 ENCOUNTER — CARDPULM VISIT (OUTPATIENT)
Dept: CARDIAC REHAB | Facility: HOSPITAL | Age: 74
End: 2024-02-26
Attending: INTERNAL MEDICINE
Payer: MEDICARE

## 2024-03-04 ENCOUNTER — CARDPULM VISIT (OUTPATIENT)
Dept: CARDIAC REHAB | Facility: HOSPITAL | Age: 74
End: 2024-03-04
Attending: INTERNAL MEDICINE
Payer: MEDICARE

## 2024-03-04 PROCEDURE — 93798 PHYS/QHP OP CAR RHAB W/ECG: CPT

## 2024-03-06 ENCOUNTER — CARDPULM VISIT (OUTPATIENT)
Dept: CARDIAC REHAB | Facility: HOSPITAL | Age: 74
End: 2024-03-06
Attending: INTERNAL MEDICINE
Payer: MEDICARE

## 2024-03-06 PROCEDURE — 93798 PHYS/QHP OP CAR RHAB W/ECG: CPT

## 2024-03-07 ENCOUNTER — ORDER TRANSCRIPTION (OUTPATIENT)
Dept: CARDIAC REHAB | Facility: HOSPITAL | Age: 74
End: 2024-03-07

## 2024-03-07 DIAGNOSIS — I25.10 CAD (CORONARY ARTERY DISEASE): Primary | ICD-10-CM

## 2024-03-08 ENCOUNTER — CARDPULM VISIT (OUTPATIENT)
Dept: CARDIAC REHAB | Facility: HOSPITAL | Age: 74
End: 2024-03-08
Attending: INTERNAL MEDICINE
Payer: MEDICARE

## 2024-03-08 PROCEDURE — 93798 PHYS/QHP OP CAR RHAB W/ECG: CPT

## 2024-03-11 ENCOUNTER — CARDPULM VISIT (OUTPATIENT)
Dept: CARDIAC REHAB | Facility: HOSPITAL | Age: 74
End: 2024-03-11
Attending: INTERNAL MEDICINE
Payer: MEDICARE

## 2024-03-11 PROCEDURE — 93798 PHYS/QHP OP CAR RHAB W/ECG: CPT

## 2024-03-13 ENCOUNTER — CARDPULM VISIT (OUTPATIENT)
Dept: CARDIAC REHAB | Facility: HOSPITAL | Age: 74
End: 2024-03-13
Attending: INTERNAL MEDICINE
Payer: MEDICARE

## 2024-03-13 PROCEDURE — 93798 PHYS/QHP OP CAR RHAB W/ECG: CPT

## 2024-03-15 ENCOUNTER — CARDPULM VISIT (OUTPATIENT)
Dept: CARDIAC REHAB | Facility: HOSPITAL | Age: 74
End: 2024-03-15
Attending: INTERNAL MEDICINE
Payer: MEDICARE

## 2024-03-15 PROCEDURE — 93798 PHYS/QHP OP CAR RHAB W/ECG: CPT

## 2024-03-18 ENCOUNTER — CARDPULM VISIT (OUTPATIENT)
Dept: CARDIAC REHAB | Facility: HOSPITAL | Age: 74
End: 2024-03-18
Attending: INTERNAL MEDICINE
Payer: MEDICARE

## 2024-03-18 PROCEDURE — 93798 PHYS/QHP OP CAR RHAB W/ECG: CPT

## 2024-03-20 ENCOUNTER — CARDPULM VISIT (OUTPATIENT)
Dept: CARDIAC REHAB | Facility: HOSPITAL | Age: 74
End: 2024-03-20
Attending: INTERNAL MEDICINE
Payer: MEDICARE

## 2024-03-20 PROCEDURE — 93798 PHYS/QHP OP CAR RHAB W/ECG: CPT

## 2024-03-22 ENCOUNTER — CARDPULM VISIT (OUTPATIENT)
Dept: CARDIAC REHAB | Facility: HOSPITAL | Age: 74
End: 2024-03-22
Attending: INTERNAL MEDICINE
Payer: MEDICARE

## 2024-03-22 PROCEDURE — 93798 PHYS/QHP OP CAR RHAB W/ECG: CPT

## 2024-03-25 ENCOUNTER — APPOINTMENT (OUTPATIENT)
Dept: CARDIAC REHAB | Facility: HOSPITAL | Age: 74
End: 2024-03-25
Attending: INTERNAL MEDICINE
Payer: MEDICARE

## 2024-03-27 ENCOUNTER — CARDPULM VISIT (OUTPATIENT)
Dept: CARDIAC REHAB | Facility: HOSPITAL | Age: 74
End: 2024-03-27
Attending: INTERNAL MEDICINE
Payer: MEDICARE

## 2024-03-27 PROCEDURE — 93798 PHYS/QHP OP CAR RHAB W/ECG: CPT

## 2024-03-29 ENCOUNTER — APPOINTMENT (OUTPATIENT)
Dept: CARDIAC REHAB | Facility: HOSPITAL | Age: 74
End: 2024-03-29
Attending: INTERNAL MEDICINE
Payer: MEDICARE

## 2024-03-29 PROCEDURE — 93798 PHYS/QHP OP CAR RHAB W/ECG: CPT

## 2024-04-01 ENCOUNTER — CARDPULM VISIT (OUTPATIENT)
Dept: CARDIAC REHAB | Facility: HOSPITAL | Age: 74
End: 2024-04-01
Attending: INTERNAL MEDICINE
Payer: MEDICARE

## 2024-04-01 PROCEDURE — 93798 PHYS/QHP OP CAR RHAB W/ECG: CPT

## 2024-04-03 ENCOUNTER — APPOINTMENT (OUTPATIENT)
Dept: CARDIAC REHAB | Facility: HOSPITAL | Age: 74
End: 2024-04-03
Attending: INTERNAL MEDICINE
Payer: MEDICARE

## 2024-04-05 ENCOUNTER — CARDPULM VISIT (OUTPATIENT)
Dept: CARDIAC REHAB | Facility: HOSPITAL | Age: 74
End: 2024-04-05
Attending: INTERNAL MEDICINE
Payer: MEDICARE

## 2024-04-05 PROCEDURE — 93798 PHYS/QHP OP CAR RHAB W/ECG: CPT

## 2024-04-08 ENCOUNTER — CARDPULM VISIT (OUTPATIENT)
Dept: CARDIAC REHAB | Facility: HOSPITAL | Age: 74
End: 2024-04-08
Attending: INTERNAL MEDICINE
Payer: MEDICARE

## 2024-04-08 PROCEDURE — 93798 PHYS/QHP OP CAR RHAB W/ECG: CPT

## 2024-04-10 ENCOUNTER — CARDPULM VISIT (OUTPATIENT)
Dept: CARDIAC REHAB | Facility: HOSPITAL | Age: 74
End: 2024-04-10
Attending: INTERNAL MEDICINE
Payer: MEDICARE

## 2024-04-10 PROCEDURE — 93798 PHYS/QHP OP CAR RHAB W/ECG: CPT

## 2024-04-12 ENCOUNTER — CARDPULM VISIT (OUTPATIENT)
Dept: CARDIAC REHAB | Facility: HOSPITAL | Age: 74
End: 2024-04-12
Attending: INTERNAL MEDICINE
Payer: MEDICARE

## 2024-04-12 PROCEDURE — 93798 PHYS/QHP OP CAR RHAB W/ECG: CPT

## 2024-04-15 ENCOUNTER — CARDPULM VISIT (OUTPATIENT)
Dept: CARDIAC REHAB | Facility: HOSPITAL | Age: 74
End: 2024-04-15
Attending: INTERNAL MEDICINE
Payer: MEDICARE

## 2024-04-15 PROCEDURE — 93798 PHYS/QHP OP CAR RHAB W/ECG: CPT

## 2024-04-17 ENCOUNTER — CARDPULM VISIT (OUTPATIENT)
Dept: CARDIAC REHAB | Facility: HOSPITAL | Age: 74
End: 2024-04-17
Attending: INTERNAL MEDICINE
Payer: MEDICARE

## 2024-04-17 PROCEDURE — 93798 PHYS/QHP OP CAR RHAB W/ECG: CPT

## 2024-04-22 ENCOUNTER — CARDPULM VISIT (OUTPATIENT)
Dept: CARDIAC REHAB | Facility: HOSPITAL | Age: 74
End: 2024-04-22
Attending: INTERNAL MEDICINE
Payer: MEDICARE

## 2024-04-22 PROCEDURE — 93798 PHYS/QHP OP CAR RHAB W/ECG: CPT

## 2024-04-24 ENCOUNTER — CARDPULM VISIT (OUTPATIENT)
Dept: CARDIAC REHAB | Facility: HOSPITAL | Age: 74
End: 2024-04-24
Attending: INTERNAL MEDICINE
Payer: MEDICARE

## 2024-04-24 PROCEDURE — 93798 PHYS/QHP OP CAR RHAB W/ECG: CPT

## 2024-04-26 ENCOUNTER — CARDPULM VISIT (OUTPATIENT)
Dept: CARDIAC REHAB | Facility: HOSPITAL | Age: 74
End: 2024-04-26
Attending: INTERNAL MEDICINE
Payer: MEDICARE

## 2024-04-26 PROCEDURE — 93798 PHYS/QHP OP CAR RHAB W/ECG: CPT

## 2024-04-29 ENCOUNTER — CARDPULM VISIT (OUTPATIENT)
Dept: CARDIAC REHAB | Facility: HOSPITAL | Age: 74
End: 2024-04-29
Attending: INTERNAL MEDICINE
Payer: MEDICARE

## 2024-04-29 PROCEDURE — 93798 PHYS/QHP OP CAR RHAB W/ECG: CPT

## 2024-05-01 ENCOUNTER — CARDPULM VISIT (OUTPATIENT)
Dept: CARDIAC REHAB | Facility: HOSPITAL | Age: 74
End: 2024-05-01
Attending: INTERNAL MEDICINE
Payer: MEDICARE

## 2024-05-01 PROCEDURE — 93798 PHYS/QHP OP CAR RHAB W/ECG: CPT

## 2024-05-03 ENCOUNTER — CARDPULM VISIT (OUTPATIENT)
Dept: CARDIAC REHAB | Facility: HOSPITAL | Age: 74
End: 2024-05-03
Attending: INTERNAL MEDICINE
Payer: MEDICARE

## 2024-05-03 PROCEDURE — 93798 PHYS/QHP OP CAR RHAB W/ECG: CPT

## 2024-05-06 ENCOUNTER — CARDPULM VISIT (OUTPATIENT)
Dept: CARDIAC REHAB | Facility: HOSPITAL | Age: 74
End: 2024-05-06
Attending: INTERNAL MEDICINE
Payer: MEDICARE

## 2024-05-06 PROCEDURE — 93798 PHYS/QHP OP CAR RHAB W/ECG: CPT

## 2024-05-08 ENCOUNTER — CARDPULM VISIT (OUTPATIENT)
Dept: CARDIAC REHAB | Facility: HOSPITAL | Age: 74
End: 2024-05-08
Attending: INTERNAL MEDICINE
Payer: MEDICARE

## 2024-05-08 PROCEDURE — 93798 PHYS/QHP OP CAR RHAB W/ECG: CPT

## 2024-05-10 ENCOUNTER — CARDPULM VISIT (OUTPATIENT)
Dept: CARDIAC REHAB | Facility: HOSPITAL | Age: 74
End: 2024-05-10
Attending: INTERNAL MEDICINE
Payer: MEDICARE

## 2024-05-10 PROCEDURE — 93798 PHYS/QHP OP CAR RHAB W/ECG: CPT

## 2024-05-13 ENCOUNTER — CARDPULM VISIT (OUTPATIENT)
Dept: CARDIAC REHAB | Facility: HOSPITAL | Age: 74
End: 2024-05-13
Attending: INTERNAL MEDICINE
Payer: MEDICARE

## 2024-05-13 PROCEDURE — 93798 PHYS/QHP OP CAR RHAB W/ECG: CPT

## 2024-05-15 ENCOUNTER — CARDPULM VISIT (OUTPATIENT)
Dept: CARDIAC REHAB | Facility: HOSPITAL | Age: 74
End: 2024-05-15
Attending: INTERNAL MEDICINE

## 2024-05-15 PROCEDURE — 93798 PHYS/QHP OP CAR RHAB W/ECG: CPT

## 2024-05-17 ENCOUNTER — CARDPULM VISIT (OUTPATIENT)
Dept: CARDIAC REHAB | Facility: HOSPITAL | Age: 74
End: 2024-05-17
Attending: INTERNAL MEDICINE

## 2024-05-17 PROCEDURE — 93798 PHYS/QHP OP CAR RHAB W/ECG: CPT

## 2024-05-20 ENCOUNTER — CARDPULM VISIT (OUTPATIENT)
Dept: CARDIAC REHAB | Facility: HOSPITAL | Age: 74
End: 2024-05-20
Attending: INTERNAL MEDICINE

## 2024-05-20 PROCEDURE — 93798 PHYS/QHP OP CAR RHAB W/ECG: CPT

## 2024-05-22 ENCOUNTER — CARDPULM VISIT (OUTPATIENT)
Dept: CARDIAC REHAB | Facility: HOSPITAL | Age: 74
End: 2024-05-22
Attending: INTERNAL MEDICINE

## 2024-05-22 PROCEDURE — 93798 PHYS/QHP OP CAR RHAB W/ECG: CPT

## 2024-05-24 ENCOUNTER — CARDPULM VISIT (OUTPATIENT)
Dept: CARDIAC REHAB | Facility: HOSPITAL | Age: 74
End: 2024-05-24
Attending: INTERNAL MEDICINE

## 2024-05-24 PROCEDURE — 93798 PHYS/QHP OP CAR RHAB W/ECG: CPT

## 2024-05-29 ENCOUNTER — CARDPULM VISIT (OUTPATIENT)
Dept: CARDIAC REHAB | Facility: HOSPITAL | Age: 74
End: 2024-05-29
Attending: INTERNAL MEDICINE

## 2024-05-29 PROCEDURE — 93798 PHYS/QHP OP CAR RHAB W/ECG: CPT

## 2024-05-31 ENCOUNTER — CARDPULM VISIT (OUTPATIENT)
Dept: CARDIAC REHAB | Facility: HOSPITAL | Age: 74
End: 2024-05-31
Attending: INTERNAL MEDICINE

## 2024-05-31 PROCEDURE — 93798 PHYS/QHP OP CAR RHAB W/ECG: CPT

## 2024-06-03 ENCOUNTER — CARDPULM VISIT (OUTPATIENT)
Dept: CARDIAC REHAB | Facility: HOSPITAL | Age: 74
End: 2024-06-03
Attending: INTERNAL MEDICINE
Payer: MEDICARE

## 2024-06-03 PROCEDURE — 93798 PHYS/QHP OP CAR RHAB W/ECG: CPT

## 2024-06-05 ENCOUNTER — APPOINTMENT (OUTPATIENT)
Dept: CARDIAC REHAB | Facility: HOSPITAL | Age: 74
End: 2024-06-05
Attending: INTERNAL MEDICINE
Payer: MEDICARE

## 2024-06-07 ENCOUNTER — APPOINTMENT (OUTPATIENT)
Dept: CARDIAC REHAB | Facility: HOSPITAL | Age: 74
End: 2024-06-07
Attending: INTERNAL MEDICINE
Payer: MEDICARE

## 2024-06-10 ENCOUNTER — APPOINTMENT (OUTPATIENT)
Dept: CARDIAC REHAB | Facility: HOSPITAL | Age: 74
End: 2024-06-10
Attending: INTERNAL MEDICINE
Payer: MEDICARE

## 2024-06-12 ENCOUNTER — APPOINTMENT (OUTPATIENT)
Dept: CARDIAC REHAB | Facility: HOSPITAL | Age: 74
End: 2024-06-12
Attending: INTERNAL MEDICINE
Payer: MEDICARE

## 2024-06-14 ENCOUNTER — APPOINTMENT (OUTPATIENT)
Dept: CARDIAC REHAB | Facility: HOSPITAL | Age: 74
End: 2024-06-14
Attending: INTERNAL MEDICINE
Payer: MEDICARE

## 2024-12-22 ENCOUNTER — HOSPITAL ENCOUNTER (INPATIENT)
Facility: HOSPITAL | Age: 74
LOS: 2 days | Discharge: HOME | End: 2024-12-24
Attending: EMERGENCY MEDICINE | Admitting: HOSPITALIST
Payer: MEDICARE

## 2024-12-22 DIAGNOSIS — R31.9 HEMATURIA, UNSPECIFIED TYPE: Primary | ICD-10-CM

## 2024-12-22 LAB
ANION GAP SERPL CALC-SCNC: 8 MMOL/L (ref 0–18)
BASOPHILS # BLD AUTO: 0.03 X10(3) UL (ref 0–0.2)
BASOPHILS NFR BLD AUTO: 0.2 %
BILIRUB UR QL: NEGATIVE
BUN BLD-MCNC: 15 MG/DL (ref 9–23)
BUN/CREAT SERPL: 17.9 (ref 10–20)
CALCIUM BLD-MCNC: 9.9 MG/DL (ref 8.7–10.4)
CHLORIDE SERPL-SCNC: 96 MMOL/L (ref 98–112)
CO2 SERPL-SCNC: 24 MMOL/L (ref 21–32)
CREAT BLD-MCNC: 0.84 MG/DL
DEPRECATED RDW RBC AUTO: 39.9 FL (ref 35.1–46.3)
EGFRCR SERPLBLD CKD-EPI 2021: 91 ML/MIN/1.73M2 (ref 60–?)
EOSINOPHIL # BLD AUTO: 0.01 X10(3) UL (ref 0–0.7)
EOSINOPHIL NFR BLD AUTO: 0.1 %
ERYTHROCYTE [DISTWIDTH] IN BLOOD BY AUTOMATED COUNT: 11.6 % (ref 11–15)
EST. AVERAGE GLUCOSE BLD GHB EST-MCNC: 131 MG/DL (ref 68–126)
GLUCOSE BLD-MCNC: 128 MG/DL (ref 70–99)
GLUCOSE BLDC GLUCOMTR-MCNC: 116 MG/DL (ref 70–99)
GLUCOSE BLDC GLUCOMTR-MCNC: 125 MG/DL (ref 70–99)
GLUCOSE BLDC GLUCOMTR-MCNC: 129 MG/DL (ref 70–99)
GLUCOSE UR-MCNC: 100 MG/DL
HBA1C MFR BLD: 6.2 % (ref ?–5.7)
HCT VFR BLD AUTO: 38.9 %
HGB BLD-MCNC: 13.5 G/DL
IMM GRANULOCYTES # BLD AUTO: 0.14 X10(3) UL (ref 0–1)
IMM GRANULOCYTES NFR BLD: 0.8 %
KETONES UR-MCNC: NEGATIVE MG/DL
LYMPHOCYTES # BLD AUTO: 1.21 X10(3) UL (ref 1–4)
LYMPHOCYTES NFR BLD AUTO: 6.5 %
MCH RBC QN AUTO: 32.5 PG (ref 26–34)
MCHC RBC AUTO-ENTMCNC: 34.7 G/DL (ref 31–37)
MCV RBC AUTO: 93.7 FL
MONOCYTES # BLD AUTO: 1.21 X10(3) UL (ref 0.1–1)
MONOCYTES NFR BLD AUTO: 6.5 %
NEUTROPHILS # BLD AUTO: 16.05 X10 (3) UL (ref 1.5–7.7)
NEUTROPHILS # BLD AUTO: 16.05 X10(3) UL (ref 1.5–7.7)
NEUTROPHILS NFR BLD AUTO: 85.9 %
NITRITE UR QL STRIP.AUTO: NEGATIVE
OSMOLALITY SERPL CALC.SUM OF ELEC: 268 MOSM/KG (ref 275–295)
PH UR: 7 [PH] (ref 5–8)
PLATELET # BLD AUTO: 208 10(3)UL (ref 150–450)
POTASSIUM SERPL-SCNC: 4.2 MMOL/L (ref 3.5–5.1)
PROT UR-MCNC: >=300 MG/DL
RBC # BLD AUTO: 4.15 X10(6)UL
RBC #/AREA URNS AUTO: >10 /HPF
RBC #/AREA URNS AUTO: >10 /HPF
SODIUM SERPL-SCNC: 128 MMOL/L (ref 136–145)
SP GR UR STRIP: 1.02 (ref 1–1.03)
UROBILINOGEN UR STRIP-ACNC: 0.2
WBC # BLD AUTO: 18.7 X10(3) UL (ref 4–11)
WBC #/AREA URNS AUTO: >50 /HPF
WBC #/AREA URNS AUTO: >50 /HPF

## 2024-12-22 PROCEDURE — 99285 EMERGENCY DEPT VISIT HI MDM: CPT

## 2024-12-22 PROCEDURE — 87086 URINE CULTURE/COLONY COUNT: CPT | Performed by: EMERGENCY MEDICINE

## 2024-12-22 PROCEDURE — 80048 BASIC METABOLIC PNL TOTAL CA: CPT | Performed by: EMERGENCY MEDICINE

## 2024-12-22 PROCEDURE — 87088 URINE BACTERIA CULTURE: CPT | Performed by: EMERGENCY MEDICINE

## 2024-12-22 PROCEDURE — 93005 ELECTROCARDIOGRAM TRACING: CPT

## 2024-12-22 PROCEDURE — 81001 URINALYSIS AUTO W/SCOPE: CPT | Performed by: EMERGENCY MEDICINE

## 2024-12-22 PROCEDURE — 93010 ELECTROCARDIOGRAM REPORT: CPT | Performed by: INTERNAL MEDICINE

## 2024-12-22 PROCEDURE — 85025 COMPLETE CBC W/AUTO DIFF WBC: CPT | Performed by: EMERGENCY MEDICINE

## 2024-12-22 PROCEDURE — 81001 URINALYSIS AUTO W/SCOPE: CPT

## 2024-12-22 PROCEDURE — 51700 IRRIGATION OF BLADDER: CPT

## 2024-12-22 PROCEDURE — 82962 GLUCOSE BLOOD TEST: CPT

## 2024-12-22 PROCEDURE — 87086 URINE CULTURE/COLONY COUNT: CPT

## 2024-12-22 PROCEDURE — 87186 SC STD MICRODIL/AGAR DIL: CPT | Performed by: EMERGENCY MEDICINE

## 2024-12-22 PROCEDURE — 81015 MICROSCOPIC EXAM OF URINE: CPT

## 2024-12-22 PROCEDURE — 3E1K78Z IRRIGATION OF GENITOURINARY TRACT USING IRRIGATING SUBSTANCE, VIA NATURAL OR ARTIFICIAL OPENING: ICD-10-PCS | Performed by: STUDENT IN AN ORGANIZED HEALTH CARE EDUCATION/TRAINING PROGRAM

## 2024-12-22 PROCEDURE — 83036 HEMOGLOBIN GLYCOSYLATED A1C: CPT | Performed by: HOSPITALIST

## 2024-12-22 RX ORDER — ACETAMINOPHEN 500 MG
500 TABLET ORAL EVERY 4 HOURS PRN
Status: DISCONTINUED | OUTPATIENT
Start: 2024-12-22 | End: 2024-12-24

## 2024-12-22 RX ORDER — NICOTINE POLACRILEX 4 MG
30 LOZENGE BUCCAL
Status: DISCONTINUED | OUTPATIENT
Start: 2024-12-22 | End: 2024-12-24

## 2024-12-22 RX ORDER — TAMSULOSIN HYDROCHLORIDE 0.4 MG/1
0.4 CAPSULE ORAL DAILY
COMMUNITY

## 2024-12-22 RX ORDER — ECHINACEA PURPUREA EXTRACT 125 MG
1 TABLET ORAL
Status: DISCONTINUED | OUTPATIENT
Start: 2024-12-22 | End: 2024-12-24

## 2024-12-22 RX ORDER — METOCLOPRAMIDE HYDROCHLORIDE 5 MG/ML
10 INJECTION INTRAMUSCULAR; INTRAVENOUS EVERY 8 HOURS PRN
Status: DISCONTINUED | OUTPATIENT
Start: 2024-12-22 | End: 2024-12-24

## 2024-12-22 RX ORDER — LIDOCAINE HYDROCHLORIDE 20 MG/ML
JELLY TOPICAL
Status: COMPLETED
Start: 2024-12-22 | End: 2024-12-22

## 2024-12-22 RX ORDER — BISACODYL 10 MG
10 SUPPOSITORY, RECTAL RECTAL
Status: DISCONTINUED | OUTPATIENT
Start: 2024-12-22 | End: 2024-12-24

## 2024-12-22 RX ORDER — ROSUVASTATIN CALCIUM 20 MG/1
20 TABLET, COATED ORAL NIGHTLY
Status: DISCONTINUED | OUTPATIENT
Start: 2024-12-22 | End: 2024-12-24

## 2024-12-22 RX ORDER — DILTIAZEM HYDROCHLORIDE 5 MG/ML
INJECTION INTRAVENOUS
Status: COMPLETED
Start: 2024-12-22 | End: 2024-12-22

## 2024-12-22 RX ORDER — ACETAMINOPHEN 325 MG/1
650 TABLET ORAL EVERY 4 HOURS PRN
Status: DISCONTINUED | OUTPATIENT
Start: 2024-12-22 | End: 2024-12-24

## 2024-12-22 RX ORDER — DEXTROSE MONOHYDRATE 25 G/50ML
50 INJECTION, SOLUTION INTRAVENOUS
Status: DISCONTINUED | OUTPATIENT
Start: 2024-12-22 | End: 2024-12-24

## 2024-12-22 RX ORDER — POLYETHYLENE GLYCOL 3350 17 G/17G
17 POWDER, FOR SOLUTION ORAL DAILY PRN
Status: DISCONTINUED | OUTPATIENT
Start: 2024-12-22 | End: 2024-12-24

## 2024-12-22 RX ORDER — LIDOCAINE HYDROCHLORIDE 20 MG/ML
10 JELLY TOPICAL ONCE
Status: COMPLETED | OUTPATIENT
Start: 2024-12-22 | End: 2024-12-22

## 2024-12-22 RX ORDER — MAGNESIUM HYDROXIDE 1200 MG/15ML
3000 LIQUID ORAL CONTINUOUS
Status: DISCONTINUED | OUTPATIENT
Start: 2024-12-22 | End: 2024-12-24

## 2024-12-22 RX ORDER — HYDROCODONE BITARTRATE AND ACETAMINOPHEN 5; 325 MG/1; MG/1
1 TABLET ORAL EVERY 4 HOURS PRN
Status: DISCONTINUED | OUTPATIENT
Start: 2024-12-22 | End: 2024-12-24

## 2024-12-22 RX ORDER — SENNOSIDES 8.6 MG
17.2 TABLET ORAL NIGHTLY PRN
Status: DISCONTINUED | OUTPATIENT
Start: 2024-12-22 | End: 2024-12-24

## 2024-12-22 RX ORDER — SODIUM CHLORIDE 9 MG/ML
INJECTION, SOLUTION INTRAVENOUS CONTINUOUS
Status: DISCONTINUED | OUTPATIENT
Start: 2024-12-22 | End: 2024-12-24

## 2024-12-22 RX ORDER — HYDROCODONE BITARTRATE AND ACETAMINOPHEN 5; 325 MG/1; MG/1
2 TABLET ORAL EVERY 4 HOURS PRN
Status: DISCONTINUED | OUTPATIENT
Start: 2024-12-22 | End: 2024-12-24

## 2024-12-22 RX ORDER — NICOTINE POLACRILEX 4 MG
15 LOZENGE BUCCAL
Status: DISCONTINUED | OUTPATIENT
Start: 2024-12-22 | End: 2024-12-24

## 2024-12-22 RX ORDER — ONDANSETRON 2 MG/ML
4 INJECTION INTRAMUSCULAR; INTRAVENOUS EVERY 6 HOURS PRN
Status: DISCONTINUED | OUTPATIENT
Start: 2024-12-22 | End: 2024-12-24

## 2024-12-22 RX ORDER — ESCITALOPRAM OXALATE 5 MG/1
5 TABLET ORAL DAILY
Status: DISCONTINUED | OUTPATIENT
Start: 2024-12-23 | End: 2024-12-22

## 2024-12-22 RX ORDER — ALLOPURINOL 100 MG/1
100 TABLET ORAL DAILY
Status: DISCONTINUED | OUTPATIENT
Start: 2024-12-23 | End: 2024-12-24

## 2024-12-22 RX ORDER — FUROSEMIDE 20 MG/1
20 TABLET ORAL EVERY OTHER DAY
COMMUNITY

## 2024-12-22 RX ORDER — CEPHALEXIN 500 MG/1
500 CAPSULE ORAL 3 TIMES DAILY
Qty: 15 CAPSULE | Refills: 0 | Status: SHIPPED | OUTPATIENT
Start: 2024-12-22 | End: 2024-12-24

## 2024-12-22 NOTE — ED PROVIDER NOTES
Patient Seen in: [facility] 5sw/se    History     Chief Complaint   Patient presents with    Urinary Symptoms     Stated Complaint: hematuria    HPI    Patient complains of urinary frequency, and now hematuria .  Patient denies fever or chills, no vomiting, no back pain.  Patient denies  abdominal pain.     Past Medical History:    Amblyopia     Right Eye     Anxiety    Cataracts, bilateral    per NG    Cellulitis of left foot    Depression    Diabetes mellitus type 2 without retinopathy (HCC)    per NG    History of hepatitis A virus infection    per     MGD (meibomian gland dysfunction)    per     Other and unspecified hyperlipidemia    per     Type II or unspecified type diabetes mellitus without mention of complication, not stated as uncontrolled    per     Unspecified essential hypertension    per NG    Vitreous floaters of right eye    per NG       Past Surgical History:   Procedure Laterality Date    Back surgery      Colonoscopy  10/2007            Family History   Problem Relation Age of Onset    Other (Hernia complications) Father 50        Cause of death; per     Diabetes Sister         per     Glaucoma Neg     Macular degeneration Neg        Social History     Socioeconomic History    Marital status:    Tobacco Use    Smoking status: Never    Smokeless tobacco: Never   Vaping Use    Vaping status: Never Used   Substance and Sexual Activity    Alcohol use: Not Currently     Comment: 1 beer/month    Drug use: No    Sexual activity: Not Currently     Partners: Female   Other Topics Concern    Caffeine Concern Yes     Comment: Coffee, 2 cups; per NG    Reaction to local anesthetic No     Social Drivers of Health     Financial Resource Strain: Low Risk  (9/24/2024)    Received from [facility]    Overall Financial Resource Strain (CARDIA)     Difficulty of Paying Living Expenses: Not hard at all   Food Insecurity: No Food Insecurity (12/22/2024)    Food Insecurity      Food Insecurity: Never true   Transportation Needs: No Transportation Needs (12/22/2024)    Transportation Needs     Lack of Transportation: No   Physical Activity: Insufficiently Active (9/24/2024)    Received from [facility]    Exercise Vital Sign     Days of Exercise per Week: 2 days     Minutes of Exercise per Session: 30 min   Stress: No Stress Concern Present (9/24/2024)    Received from [facility]    [employer] - Occupational Stress Questionnaire     Feeling of Stress : Not at all   Social Connections: Moderately Isolated (9/24/2024)    Received from [facility]    Social Connection and Isolation Panel [NHANES]     Frequency of Communication with Friends and Family: Twice a week     Frequency of Social Gatherings with Friends and Family: Twice a week     Attends Islam Services: Never     Active Member of Clubs or Organizations: No     Attends Club or Organization Meetings: Never     Marital Status:    Housing Stability: Low Risk  (12/22/2024)    Housing Stability     Housing Instability: No       Review of Systems    Positive for stated complaint: hematuria  Other systems are as noted in HPI.  Constitutional and vital signs reviewed.      All other systems reviewed and negative except as noted above.    PSFH elements reviewed from today and agreed except as otherwise stated in HPI.    Physical Exam     ED Triage Vitals [12/22/24 1211]   BP (!) 165/76   Pulse 95   Resp 22   Temp 98.1 °F (36.7 °C)   Temp src Temporal   SpO2 98 %   O2 Device None (Room air)       Current:/75 (BP Location: Right arm)   Pulse 86   Temp 97.8 °F (36.6 °C) (Oral)   Resp 18   Ht 182.9 cm (6')   Wt 94.9 kg   SpO2 97%   BMI 28.39 kg/m²   GENERAL: alert, no distress  SKIN: good skin turgor, no rashes  HEENT: atraumatic, normocephalic, ears and throat are clear  EYES: sclera non icteric, conjunctiva non-injected  NECK: supple,  LUNGS:no resp  distress  CARDIO:regular rate  EXTREMITIES: no cyanosis, clubbing or edema  BACK: no CVA tenderness  GI: soft, mild suprapubic tenderness, no guarding      ED Course     Labs Reviewed   URINALYSIS WITH CULTURE REFLEX - Abnormal; Notable for the following components:       Result Value    Clarity Urine Cloudy (*)     Glucose Urine 100 (*)     Blood Urine Large (*)     Protein Urine >=300 (*)     Leukocyte Esterase Urine Small (*)     WBC Urine >50 (*)     RBC Urine >10 (*)     All other components within normal limits    Narrative:     Urine Culture Recommended  Urine Culture Recommended   UA MICROSCOPIC ONLY, URINE - Abnormal; Notable for the following components:    WBC Urine >50 (*)     RBC Urine >10 (*)     All other components within normal limits   CBC WITH DIFFERENTIAL WITH PLATELET - Abnormal; Notable for the following components:    WBC 18.7 (*)     HCT 38.9 (*)     Neutrophil Absolute Prelim 16.05 (*)     Neutrophil Absolute 16.05 (*)     Monocyte Absolute 1.21 (*)     All other components within normal limits   BASIC METABOLIC PANEL (8) - Abnormal; Notable for the following components:    Glucose 128 (*)     Sodium 128 (*)     Chloride 96 (*)     Calculated Osmolality 268 (*)     All other components within normal limits   HEMOGLOBIN A1C - Abnormal; Notable for the following components:    HgbA1C 6.2 (*)     Estimated Average Glucose 131 (*)     All other components within normal limits   URINE CULTURE, ROUTINE       MDM       Medical Decision Making  Patient with clots noted bladder scan negative for retention CBI was initiated initially urine cleared planning to potentially consider discharging then began bleeding again.  Admit spoke with urology duly hospitalist in the ER for admission orders.    Problems Addressed:  Hematuria, unspecified type: acute illness or injury    Amount and/or Complexity of Data Reviewed  Labs: ordered. Decision-making details documented in ED Course.    Risk  Decision  regarding hospitalization.            Disposition and Plan     Clinical Impression:  1. Hematuria, unspecified type        Disposition:  Admit    Follow-up:  Sea Robb DO  [address] [address]  [phone]    Follow up        Medications Prescribed:  Current Discharge Medication List        START taking these medications    Details   cephALEXin 500 MG Oral Cap Take 1 capsule (500 mg total) by mouth 3 (three) times daily for 5 days.  Qty: 15 capsule, Refills: 0             Hospital Problems       Present on Admission  Date Reviewed: 2/1/2024            ICD-10-CM Noted POA    * (Principal) Hematuria, unspecified type R31.9 12/22/2024 Unknown

## 2024-12-22 NOTE — ED QUICK NOTES
Went to dc vasquez, blood coming from urethra immediately, dr ruelas aware, verbal order to replace vasquez and start CBI again

## 2024-12-22 NOTE — ED INITIAL ASSESSMENT (HPI)
Patient arrives with reports of urinary frequency starting yesterday and hematuria starting today. C/o fevers.   Denies abd pain/flank pain.

## 2024-12-22 NOTE — H&P
[facility]  part of [facility]     [facility] Hospitalist H&P     CC:   Chief Complaint   Patient presents with    Urinary Symptoms        PCP: Ashlyn Fajardo MD      Assessment and Plan     Martin Finley is a 74 year old male with PMH sig for major depressive disorder, type 2 diabetes, CAD status post stent in December 2023 with MCI, hyperlipidemia and BPH  who presented with sudden onset gross hematuria on 12/22/24.  Initially cleared with CBI but then hematuria resumed.  Called for admission, urology consulted.  Patient with known history of BPH and recently started on Flomax.      Gross hematuria, reported fevers and dysuria, polyuria  - UA not overwhelming for infection but does have elevated leuko, start ceftriaxone with follow-up cultures  - Continue CBI, monitor expected acute blood loss anemia, follow-up urology recommendations    CAD status post stent in December 2023 with MCI  - No signs of current chest pain or shortness of breath, patient is 1 year post stent okay to hold Plavix and aspirin temporarily.    Hyperlipidemia  - Continue statin    BPH  - Continue home med, see above    History of hyperkalemia, BMP pending  - Follows with Dr. Coon  consult if needed    FEN: IV fluids as needed, lites in a.m., diabetic diet, n.p.o. in the morning    PPx: SCDs, no AC secondary to bleed    Dispo: Full code, pending course    Outpatient records reviewed confirming patient's medical history and medications.     Further recommendations pending patient's clinical course.  [facility] hospitalist to continue to follow patient while in house    Patient and/or patient's family given opportunity to ask questions and note understanding and agreeing with therapeutic plan as outlined    Note: This chart was prepared using voice recognition software and may contain unintended word substitution errors.       Discussed with wife at bedside    Thank You,  Ava Avitia MD  [facility] Hospitalist     Answering Service number:  [phone]    HPI     Martin Fniley is a 74 year old male with PMH sig for major depressive disorder, type 2 diabetes, CAD status post stent in December 2023 with MCI, hyperlipidemia and BPH  who presented with sudden onset gross hematuria on 12/22/24.  Initially cleared with CBI but then hematuria resumed.  Called for admission, urology consulted.  Patient with known history of BPH and recently started on Flomax.      Follows me that yesterday he was having some chills and was febrile to 101.  Was given some's Tylenol and fevers have not recurred.  Patient does state that he feels some burning with urination and increased frequency.    Review of Systems  12 point systems reviewed, please see HPI for pertinent positives, otherwise negative    History     PMH  Past Medical History:    Amblyopia     Right Eye     Anxiety    Cataracts, bilateral    per NG    Cellulitis of left foot    Depression    Diabetes mellitus type 2 without retinopathy (HCC)    per NG    History of hepatitis A virus infection    per NG    MGD (meibomian gland dysfunction)    per     Other and unspecified hyperlipidemia    per     Type II or unspecified type diabetes mellitus without mention of complication, not stated as uncontrolled    per     Unspecified essential hypertension    per NG    Vitreous floaters of right eye    per NG        PSH  Past Surgical History:   Procedure Laterality Date    Back surgery      Colonoscopy  10/2007        ALL:  Allergies[1]     Home Medications:  Medications Taking[2]    Soc Hx  Social History     Tobacco Use    Smoking status: Never    Smokeless tobacco: Never   Substance Use Topics    Alcohol use: Not Currently     Comment: 1 beer/month        Fam Hx  Family History   Problem Relation Age of Onset    Other (Hernia complications) Father 50        Cause of death; per NG    Diabetes Sister         per NG    Glaucoma Neg     Macular degeneration Neg            Objective     Temp: 98.1 °F (36.7  °C)  Pulse: 95  Resp: 22  BP: 165/76    Exam  Gen: No acute distress, alert and oriented x3  Neck Supple, no JVD  Pulm: Lungs clear, normal respiratory effort, No wheezing or crackles  CV: Heart with regular rate and rhythm, No murmurs, rubs, gallops  Abd: Abdomen soft, nontender, nondistended, no organomegaly, bowel sounds present  MSK: Full range of motion in extremities, no clubbing, no cyanosis.  No Lower extremity edema  Skin: no rashes or lesions, well perfused  Psych: mood stable, cooperative  Neuro: No focal deficits  -Sanchez with gross hematuria    Diagnostic Data:    CBC/Chem  No results for input(s): \"WBC\", \"HGB\", \"MCV\", \"PLT\", \"BAND\", \"INR\" in the last 168 hours.    Invalid input(s): \"LYM#\", \"MONO#\", \"BASOS#\", \"EOSIN#\"    No results for input(s): \"NA\", \"K\", \"CL\", \"CO2\", \"BUN\", \"CREATSERUM\", \"GLU\", \"CA\", \"CAION\", \"MG\", \"PHOS\" in the last 168 hours.    No results for input(s): \"ALT\", \"AST\", \"ALB\", \"AMYLASE\", \"LIPASE\", \"LDH\" in the last 168 hours.    Invalid input(s): \"ALPHOS\", \"TBIL\", \"DBIL\", \"TPROT\"    No results for input(s): \"TROP\" in the last 168 hours.         Radiology: No results found.              [1] No Known Allergies  [2]   Outpatient Medications Marked as Taking for the 12/22/24 encounter (Hospital Encounter)   Medication Sig Dispense Refill    cephALEXin 500 MG Oral Cap Take 1 capsule (500 mg total) by mouth 3 (three) times daily for 5 days. 15 capsule 0

## 2024-12-23 LAB
ANION GAP SERPL CALC-SCNC: 7 MMOL/L (ref 0–18)
BASOPHILS # BLD: 0 X10(3) UL (ref 0–0.2)
BASOPHILS NFR BLD: 0 %
BUN BLD-MCNC: 19 MG/DL (ref 9–23)
BUN/CREAT SERPL: 17.6 (ref 10–20)
CALCIUM BLD-MCNC: 9.4 MG/DL (ref 8.7–10.4)
CHLORIDE SERPL-SCNC: 98 MMOL/L (ref 98–112)
CO2 SERPL-SCNC: 27 MMOL/L (ref 21–32)
CREAT BLD-MCNC: 1.08 MG/DL
DEPRECATED RDW RBC AUTO: 40.9 FL (ref 35.1–46.3)
EGFRCR SERPLBLD CKD-EPI 2021: 72 ML/MIN/1.73M2 (ref 60–?)
EOSINOPHIL # BLD: 0 X10(3) UL (ref 0–0.7)
EOSINOPHIL NFR BLD: 0 %
ERYTHROCYTE [DISTWIDTH] IN BLOOD BY AUTOMATED COUNT: 11.9 % (ref 11–15)
GLUCOSE BLD-MCNC: 143 MG/DL (ref 70–99)
GLUCOSE BLDC GLUCOMTR-MCNC: 133 MG/DL (ref 70–99)
GLUCOSE BLDC GLUCOMTR-MCNC: 135 MG/DL (ref 70–99)
GLUCOSE BLDC GLUCOMTR-MCNC: 135 MG/DL (ref 70–99)
GLUCOSE BLDC GLUCOMTR-MCNC: 172 MG/DL (ref 70–99)
HCT VFR BLD AUTO: 35.4 %
HGB BLD-MCNC: 12.7 G/DL
LYMPHOCYTES NFR BLD: 0.98 X10(3) UL (ref 1–4)
LYMPHOCYTES NFR BLD: 4 %
MAGNESIUM SERPL-MCNC: 1.8 MG/DL (ref 1.6–2.6)
MCH RBC QN AUTO: 33.8 PG (ref 26–34)
MCHC RBC AUTO-ENTMCNC: 35.9 G/DL (ref 31–37)
MCV RBC AUTO: 94.1 FL
METAMYELOCYTES # BLD: 0.25 X10(3) UL
METAMYELOCYTES NFR BLD: 1 %
MONOCYTES # BLD: 1.72 X10(3) UL (ref 0.1–1)
MONOCYTES NFR BLD: 7 %
MORPHOLOGY: NORMAL
NEUTROPHILS # BLD AUTO: 20.99 X10 (3) UL (ref 1.5–7.7)
NEUTROPHILS NFR BLD: 85 %
NEUTS BAND NFR BLD: 3 %
NEUTS HYPERSEG # BLD: 21.56 X10(3) UL (ref 1.5–7.7)
OSMOLALITY SERPL CALC.SUM OF ELEC: 279 MOSM/KG (ref 275–295)
PHOSPHATE SERPL-MCNC: 4 MG/DL (ref 2.4–5.1)
PLATELET # BLD AUTO: 177 10(3)UL (ref 150–450)
PLATELET MORPHOLOGY: NORMAL
POTASSIUM SERPL-SCNC: 3.7 MMOL/L (ref 3.5–5.1)
POTASSIUM SERPL-SCNC: 5.4 MMOL/L (ref 3.5–5.1)
Q-T INTERVAL: 308 MS
QRS DURATION: 90 MS
QTC CALCULATION (BEZET): 444 MS
R AXIS: -29 DEGREES
RBC # BLD AUTO: 3.76 X10(6)UL
SODIUM SERPL-SCNC: 132 MMOL/L (ref 136–145)
T AXIS: 41 DEGREES
TOTAL CELLS COUNTED BLD: 100
VENTRICULAR RATE: 125 BPM
WBC # BLD AUTO: 24.5 X10(3) UL (ref 4–11)

## 2024-12-23 PROCEDURE — 80048 BASIC METABOLIC PNL TOTAL CA: CPT | Performed by: HOSPITALIST

## 2024-12-23 PROCEDURE — 85007 BL SMEAR W/DIFF WBC COUNT: CPT | Performed by: HOSPITALIST

## 2024-12-23 PROCEDURE — 85027 COMPLETE CBC AUTOMATED: CPT | Performed by: HOSPITALIST

## 2024-12-23 PROCEDURE — 84100 ASSAY OF PHOSPHORUS: CPT | Performed by: HOSPITALIST

## 2024-12-23 PROCEDURE — 87040 BLOOD CULTURE FOR BACTERIA: CPT | Performed by: HOSPITALIST

## 2024-12-23 PROCEDURE — 83735 ASSAY OF MAGNESIUM: CPT | Performed by: HOSPITALIST

## 2024-12-23 PROCEDURE — 84132 ASSAY OF SERUM POTASSIUM: CPT | Performed by: HOSPITALIST

## 2024-12-23 PROCEDURE — 82962 GLUCOSE BLOOD TEST: CPT

## 2024-12-23 PROCEDURE — 85025 COMPLETE CBC W/AUTO DIFF WBC: CPT | Performed by: HOSPITALIST

## 2024-12-23 RX ORDER — MAGNESIUM OXIDE 400 MG/1
400 TABLET ORAL ONCE
Status: COMPLETED | OUTPATIENT
Start: 2024-12-23 | End: 2024-12-23

## 2024-12-23 RX ORDER — TAMSULOSIN HYDROCHLORIDE 0.4 MG/1
0.4 CAPSULE ORAL
Status: DISCONTINUED | OUTPATIENT
Start: 2024-12-23 | End: 2024-12-24

## 2024-12-23 NOTE — PROGRESS NOTES
[facility]  part of [facility]     Progress Note    Martin Finley Patient Status:  Inpatient    1950 MRN [id number]   Location [facility] 5SW/SE Attending Ava Avitia MD   Hosp Day # 1 PCP Ashlyn Fajardo MD     Subjective:  Martin Finley is a(n) 74 year old male.    Current  complaints: feels good    Medications:  Current Facility-Administered Medications   Medication Dose Route Frequency    sodium chloride 0.9 % irrigation 3,000 mL  3,000 mL Irrigation Continuous    glucose (Dex4) 15 GM/59ML oral liquid 15 g  15 g Oral Q15 Min PRN    Or    glucose (Glutose) 40% oral gel 15 g  15 g Oral Q15 Min PRN    Or    glucose-vitamin C (Dex-4) chewable tab 4 tablet  4 tablet Oral Q15 Min PRN    Or    dextrose 50% injection 50 mL  50 mL Intravenous Q15 Min PRN    Or    glucose (Dex4) 15 GM/59ML oral liquid 30 g  30 g Oral Q15 Min PRN    Or    glucose (Glutose) 40% oral gel 30 g  30 g Oral Q15 Min PRN    Or    glucose-vitamin C (Dex-4) chewable tab 8 tablet  8 tablet Oral Q15 Min PRN    insulin aspart (NovoLOG) 100 Units/mL FlexPen 1-7 Units  1-7 Units Subcutaneous TID CC    cefTRIAXone (Rocephin) 1 g in sodium chloride 0.9% 100 mL IVPB-ADDV  1 g Intravenous Q24H    acetaminophen (Tylenol Extra Strength) tab 500 mg  500 mg Oral Q4H PRN    acetaminophen (Tylenol) tab 650 mg  650 mg Oral Q4H PRN    Or    HYDROcodone-acetaminophen (Norco) 5-325 MG per tab 1 tablet  1 tablet Oral Q4H PRN    Or    HYDROcodone-acetaminophen (Norco) 5-325 MG per tab 2 tablet  2 tablet Oral Q4H PRN    ondansetron (Zofran) 4 MG/2ML injection 4 mg  4 mg Intravenous Q6H PRN    metoclopramide (Reglan) 5 mg/mL injection 10 mg  10 mg Intravenous Q8H PRN    polyethylene glycol (PEG 3350) (Miralax) 17 g oral packet 17 g  17 g Oral Daily PRN    sennosides (Senokot) tab 17.2 mg  17.2 mg Oral Nightly PRN    bisacodyl (Dulcolax) 10 MG rectal suppository 10 mg  10 mg Rectal Daily PRN    glycerin-hypromellose-  (Artificial Tears) 0.2-0.2-1 % ophthalmic solution 1 drop  1 drop Both Eyes QID PRN    sodium chloride (Saline Mist) 0.65 % nasal solution 1 spray  1 spray Each Nare Q3H PRN    allopurinol (Zyloprim) tab 100 mg  100 mg Oral Daily    rosuvastatin (Crestor) tab 20 mg  20 mg Oral Nightly    sodium chloride 0.9% infusion   Intravenous Continuous     Objective:  /53 (BP Location: Right arm)   Pulse 81   Temp 98.5 °F (36.9 °C) (Oral)   Resp 16   Ht 6' (1.829 m)   Wt 209 lb 4.8 oz (94.9 kg)   SpO2 95%   BMI 28.39 kg/m²     Intake/Output Summary (Last 24 hours) at 12/23/2024 1147  Last data filed at 12/23/2024 0600  Gross per 24 hour   Intake 1526.1 ml   Output 750 ml   Net 776.1 ml       General Appearance: Alert, cooperative, no distress, appears stated age  Head: Normocephalic, without obvious abnormality, atraumatic  Genitalia: male without lesions, discharge or tenderness  Urine: Clear per CBI            Lab Results   Component Value Date    WBC 24.5 12/23/2024    HGB 12.7 12/23/2024    HCT 35.4 12/23/2024    .0 12/23/2024    CREATSERUM 1.08 12/23/2024    BUN 19 12/23/2024     12/23/2024    K 5.4 12/23/2024    CL 98 12/23/2024    CO2 27.0 12/23/2024     12/23/2024    CA 9.4 12/23/2024     Lab Results   Component Value Date    COLORUR  12/22/2024      Comment:      Red      CLARITY Cloudy 12/22/2024    SPECGRAVITY 1.025 12/22/2024    GLUUR 100 12/22/2024    BILUR Negative 12/22/2024    KETUR Negative 12/22/2024    BLOODURINE Large 12/22/2024    PHURINE 7.0 12/22/2024    PROUR >=300 12/22/2024    UROBILINOGEN 0.2 12/22/2024    NITRITE Negative 12/22/2024    LEUUR Small 12/22/2024       Assessment:  UTI  BPH  Retention    Plan:  -await urine culture  -continue antibiotics  -hold CBI  -start Tamsulosin  -void trial tomorrow    Tali Coker MD  12/23/2024  11:47 AM

## 2024-12-23 NOTE — PLAN OF CARE
Problem: Patient Centered Care  Goal: Patient preferences are identified and integrated in the patient's plan of care  Description: Interventions:  - What would you like us to know as we care for you? I am from home with my wife  - Provide timely, complete, and accurate information to patient/family  - Incorporate patient and family knowledge, values, beliefs, and cultural backgrounds into the planning and delivery of care  - Encourage patient/family to participate in care and decision-making at the level they choose  - Honor patient and family perspectives and choices  Outcome: Progressing     Problem: Diabetes/Glucose Control  Goal: Glucose maintained within prescribed range  Description: INTERVENTIONS:  - Monitor Blood Glucose as ordered  - Assess for signs and symptoms of hyperglycemia and hypoglycemia  - Administer ordered medications to maintain glucose within target range  - Assess barriers to adequate nutritional intake and initiate nutrition consult as needed  - Instruct patient on self management of diabetes  Outcome: Progressing     Problem: Patient/Family Goals  Goal: Patient/Family Long Term Goal  Description: Patient's Long Term Goal: Discharge    Interventions:  - Monitor vitals  - Monitor appropriate labs  - Administer medications as ordered  - Follow MD's orders  - Update patient on plan of care   - Discharge planning     - See additional Care Plan goals for specific interventions  Outcome: Progressing  Goal: Patient/Family Short Term Goal  Description: Patient's Short Term Goal:     Interventions:   -   - See additional Care Plan goals for specific interventions  Outcome: Progressing     Problem: HEMATOLOGIC - ADULT  Goal: Maintains hematologic stability  Description: INTERVENTIONS  - Assess for signs and symptoms of bleeding or hemorrhage  - Monitor labs and vital signs for trends  - Administer supportive blood products/factors, fluids and medications as ordered and appropriate  - Administer  supportive blood products/factors as ordered and appropriate  Outcome: Progressing     Problem: PAIN - ADULT  Goal: Verbalizes/displays adequate comfort level or patient's stated pain goal  Description: INTERVENTIONS:  - Encourage pt to monitor pain and request assistance  - Assess pain using appropriate pain scale  - Administer analgesics based on type and severity of pain and evaluate response  - Implement non-pharmacological measures as appropriate and evaluate response  - Consider cultural and social influences on pain and pain management  - Manage/alleviate anxiety  - Utilize distraction and/or relaxation techniques  - Monitor for opioid side effects  - Notify MD/LIP if interventions unsuccessful or patient reports new pain  - Anticipate increased pain with activity and pre-medicate as appropriate  Outcome: Progressing     Problem: RISK FOR INFECTION - ADULT  Goal: Absence of fever/infection during anticipated neutropenic period  Description: INTERVENTIONS  - Monitor WBC  - Administer growth factors as ordered  - Implement neutropenic guidelines  Outcome: Progressing     Problem: SAFETY ADULT - FALL  Goal: Free from fall injury  Description: INTERVENTIONS:  - Assess pt frequently for physical needs  - Identify cognitive and physical deficits and behaviors that affect risk of falls.  - [place] fall precautions as indicated by assessment.  - Educate pt/family on patient safety including physical limitations  - Instruct pt to call for assistance with activity based on assessment  - Modify environment to reduce risk of injury  - Provide assistive devices as appropriate  - Consider OT/PT consult to assist with strengthening/mobility  - Encourage toileting schedule  Outcome: Progressing     Problem: DISCHARGE PLANNING  Goal: Discharge to home or other facility with appropriate resources  Description: INTERVENTIONS:  - Identify barriers to discharge w/pt and caregiver  - Include patient/family/discharge partner in  discharge planning  - Arrange for needed discharge resources and transportation as appropriate  - Identify discharge learning needs (meds, wound care, etc)  - Arrange for interpreters to assist at discharge as needed  - Consider post-discharge preferences of patient/family/discharge partner  - Complete POLST form as appropriate  - Assess patient's ability to be responsible for managing their own health  - Refer to Case Management Department for coordinating discharge planning if the patient needs post-hospital services based on physician/LIP order or complex needs related to functional status, cognitive ability or social support system  Outcome: Progressing

## 2024-12-23 NOTE — CONSULTS
[facility] CARDIOLOGY CONSULT      Martin Finley is a 74 year old male who I assessed as follows:  Chief Complaint   Patient presents with    Urinary Symptoms          REASON FOR CONSULTATION:    AF            ASSESSMENT/PLAN:     IMPRESSIONS:  Gross hematuria  PAF, new diagnosis, asymptomatic. Back in SR  CAD s/p JIMMY OM and JIMMY RCA 12/23  HTN  HL, on statin        PLAN:  ECG reviewed  Ok to stop plavix. Hold ASA for hematuria  When ok for anticoagulation from hematuria perspective, eliquis 5 BID  Echo  Follow BP; labile thus far              --------------------------------------------------------------------------------------------------------------------------------    HPI:         History CAD, HL presents with gross hematuria. Asked to see for AF, noted on admission. No chest pain, dyspnea, palpitations.    JIMMY OM and JIMMY RCA 12/23. On statin.    BP thus far .            Current Outpatient Medications   Medication Sig Dispense Refill    cephALEXin 500 MG Oral Cap Take 1 capsule (500 mg total) by mouth 3 (three) times daily for 5 days. 15 capsule 0      Past Medical History:    Amblyopia     Right Eye     Anxiety    Cataracts, bilateral    per NG    Cellulitis of left foot    Depression    Diabetes mellitus type 2 without retinopathy (HCC)    per     History of hepatitis A virus infection    per NG    MGD (meibomian gland dysfunction)    per     Other and unspecified hyperlipidemia    per     Type II or unspecified type diabetes mellitus without mention of complication, not stated as uncontrolled    per     Unspecified essential hypertension    per     Vitreous floaters of right eye    per NG     Past Surgical History:   Procedure Laterality Date    Back surgery      Colonoscopy  10/2007     Family History   Problem Relation Age of Onset    Other (Hernia complications) Father 50        Cause of death; per NG    Diabetes Sister         per     Glaucoma Neg     Macular degeneration Neg        Social History:  Social History     Socioeconomic History    Marital status:    Tobacco Use    Smoking status: Never    Smokeless tobacco: Never   Vaping Use    Vaping status: Never Used   Substance and Sexual Activity    Alcohol use: Not Currently     Comment: 1 beer/month    Drug use: No    Sexual activity: Not Currently     Partners: Female   Other Topics Concern    Caffeine Concern Yes     Comment: Coffee, 2 cups; per NG    Reaction to local anesthetic No     Social Drivers of Health     Financial Resource Strain: Low Risk  (9/24/2024)    Received from [facility]    Overall Financial Resource Strain (CARDIA)     Difficulty of Paying Living Expenses: Not hard at all   Food Insecurity: No Food Insecurity (12/22/2024)    Food Insecurity     Food Insecurity: Never true   Transportation Needs: No Transportation Needs (12/22/2024)    Transportation Needs     Lack of Transportation: No   Physical Activity: Insufficiently Active (9/24/2024)    Received from [facility]    Exercise Vital Sign     Days of Exercise per Week: 2 days     Minutes of Exercise per Session: 30 min   Stress: No Stress Concern Present (9/24/2024)    Received from [facility]    [place] [employer] - Occupational Stress Questionnaire     Feeling of Stress : Not at all   Social Connections: Moderately Isolated (9/24/2024)    Received from [facility]    Social Connection and Isolation Panel [NHANES]     Frequency of Communication with Friends and Family: Twice a week     Frequency of Social Gatherings with Friends and Family: Twice a week     Attends Buddhism Services: Never     Active Member of Clubs or Organizations: No     Attends Club or Organization Meetings: Never     Marital Status:    Housing Stability: Low Risk  (12/22/2024)    Housing Stability     Housing Instability: No          Medications:  Current Facility-Administered Medications   Medication Dose Route  Frequency    sodium chloride 0.9 % irrigation 3,000 mL  3,000 mL Irrigation Continuous    glucose (Dex4) 15 GM/59ML oral liquid 15 g  15 g Oral Q15 Min PRN    Or    glucose (Glutose) 40% oral gel 15 g  15 g Oral Q15 Min PRN    Or    glucose-vitamin C (Dex-4) chewable tab 4 tablet  4 tablet Oral Q15 Min PRN    Or    dextrose 50% injection 50 mL  50 mL Intravenous Q15 Min PRN    Or    glucose (Dex4) 15 GM/59ML oral liquid 30 g  30 g Oral Q15 Min PRN    Or    glucose (Glutose) 40% oral gel 30 g  30 g Oral Q15 Min PRN    Or    glucose-vitamin C (Dex-4) chewable tab 8 tablet  8 tablet Oral Q15 Min PRN    insulin aspart (NovoLOG) 100 Units/mL FlexPen 1-7 Units  1-7 Units Subcutaneous TID CC    cefTRIAXone (Rocephin) 1 g in sodium chloride 0.9% 100 mL IVPB-ADDV  1 g Intravenous Q24H    acetaminophen (Tylenol Extra Strength) tab 500 mg  500 mg Oral Q4H PRN    acetaminophen (Tylenol) tab 650 mg  650 mg Oral Q4H PRN    Or    HYDROcodone-acetaminophen (Norco) 5-325 MG per tab 1 tablet  1 tablet Oral Q4H PRN    Or    HYDROcodone-acetaminophen (Norco) 5-325 MG per tab 2 tablet  2 tablet Oral Q4H PRN    ondansetron (Zofran) 4 MG/2ML injection 4 mg  4 mg Intravenous Q6H PRN    metoclopramide (Reglan) 5 mg/mL injection 10 mg  10 mg Intravenous Q8H PRN    polyethylene glycol (PEG 3350) (Miralax) 17 g oral packet 17 g  17 g Oral Daily PRN    sennosides (Senokot) tab 17.2 mg  17.2 mg Oral Nightly PRN    bisacodyl (Dulcolax) 10 MG rectal suppository 10 mg  10 mg Rectal Daily PRN    glycerin-hypromellose- (Artificial Tears) 0.2-0.2-1 % ophthalmic solution 1 drop  1 drop Both Eyes QID PRN    sodium chloride (Saline Mist) 0.65 % nasal solution 1 spray  1 spray Each Nare Q3H PRN    allopurinol (Zyloprim) tab 100 mg  100 mg Oral Daily    rosuvastatin (Crestor) tab 20 mg  20 mg Oral Nightly    sodium chloride 0.9% infusion   Intravenous Continuous           Allergies  Allergies[1]            REVIEW OF SYSTEMS:   GENERAL HEALTH: no  fevers  SKIN: denies any unusual skin lesions or rashes   EARS: no recent changes in hearing  EYE: no recent vision changes  THROAT: denies sore throat  RESPIRATORY: denies cough or shortness of breath with exertion  CARDIOVASCULAR: denies chest pain, syncope, or palpitations  GI: denies abdominal pain, nausea and vomiting  NEURO: denies headaches and focal neurologic symptoms  PSYCH: no recent depression  ENDOCRINE: no change in sleep pattern  HEME: no easy bruising  : gross hematuria  All other systems reviewed and negative.          EXAM:         TELE: SR          /44 (BP Location: Right arm)   Pulse 81   Temp 98.5 °F (36.9 °C) (Oral)   Resp 16   Ht 6' (1.829 m)   Wt 209 lb 4.8 oz (94.9 kg)   SpO2 96%   BMI 28.39 kg/m²   Temp (24hrs), Av.5 °F (36.9 °C), Min:97.8 °F (36.6 °C), Max:100.1 °F (37.8 °C)    Wt Readings from Last 3 Encounters:   24 209 lb 4.8 oz (94.9 kg)   23 225 lb (102.1 kg)   22 214 lb (97.1 kg)         Intake/Output Summary (Last 24 hours) at 2024 0742  Last data filed at 2024 0600  Gross per 24 hour   Intake 1526.1 ml   Output 750 ml   Net 776.1 ml         GENERAL: well developed, well nourished, in no apparent distress  SKIN: no rashes  HEENT: atraumatic, normocephalic, throat without erythema  NECK: supple, no bruits  LUNGS: clear to auscultation  CARDIO: RRR without murmur or S3   GI: soft, nontender  EXTREMITIES: no cyanosis, clubbing or edema  NEUROLOGY: alert  PSYCH: cooperative          LABORATORY DATA:               ECG: atrial fib        ECHO :  1. Left ventricle: The cavity size is normal. Wall thickness is normal.    Systolic function is normal by visual assessment. The estimated ejection    fraction is 55%. Wall motion is normal; there are no regional wall motion    abnormalities. Left ventricular diastolic function parameters are normal.  2. Left atrium: The atrium is mildly dilated.  Impressions:  Compared to the prior study  12/2/22, there has been no  significant interval change.         Labs:  Recent Labs   Lab 12/22/24  1607 12/23/24  0612   * 143*   BUN 15 19   CREATSERUM 0.84 1.08   CA 9.9 9.4   * 132*   K 4.2 5.4*   CL 96* 98   CO2 24.0 27.0     No results for input(s): \"TROP\" in the last 168 hours.  Recent Labs   Lab 12/23/24  0612   RBC 3.76*   HGB 12.7*   HCT 35.4*   MCV 94.1   MCH 33.8   MCHC 35.9   RDW 11.9   NEPRELIM 20.99*   WBC 24.5*   .0     No results for input(s): \"TROP\", \"TROPHS\", \"CK\" in the last 168 hours.    Imaging:  No results found.         Hudson Corrales MD        [1] No Known Allergies

## 2024-12-23 NOTE — CONSULTS
[facility]  part of [facility]    Report of Consultation    Martin Finley Patient Status:  Inpatient    1950 MRN [id number]   Location [facility] 5SW/SE Attending Ava Avitia MD   Hosp Day # 0 PCP Ashlyn Fajardo MD     Date of Admission:  2024  Date of Consult:  24  Reason for Consultation:   Gross hematuria    History of Present Illness:   Patient is a 74 year old male who was admitted to the hospital for Hematuria, unspecified type. Patient states blood started today associated w dysuria. He has never had blood before. Sees Dr. Henley for BPH and is on flomax    Past Medical History  Past Medical History:    Amblyopia     Right Eye     Anxiety    Cataracts, bilateral    per NG    Cellulitis of left foot    Depression    Diabetes mellitus type 2 without retinopathy (HCC)    per NG    History of hepatitis A virus infection    per NG    MGD (meibomian gland dysfunction)    per NG    Other and unspecified hyperlipidemia    per NG    Type II or unspecified type diabetes mellitus without mention of complication, not stated as uncontrolled    per NG    Unspecified essential hypertension    per NG    Vitreous floaters of right eye    per NG       Past Surgical History  Past Surgical History:   Procedure Laterality Date    Back surgery      Colonoscopy  10/2007       Family History  Family History   Problem Relation Age of Onset    Other (Hernia complications) Father 50        Cause of death; per NG    Diabetes Sister         per NG    Glaucoma Neg     Macular degeneration Neg        Social History  Social History     Socioeconomic History    Marital status:    Tobacco Use    Smoking status: Never    Smokeless tobacco: Never   Vaping Use    Vaping status: Never Used   Substance and Sexual Activity    Alcohol use: Not Currently     Comment: 1 beer/month    Drug use: No    Sexual activity: Not Currently     Partners: Female   Other Topics Concern    Caffeine  Concern Yes     Comment: Coffee, 2 cups; per NG    Reaction to local anesthetic No     Social Drivers of Health     Financial Resource Strain: Low Risk  (9/24/2024)    Received from [facility]    Overall Financial Resource Strain (CARDIA)     Difficulty of Paying Living Expenses: Not hard at all   Food Insecurity: No Food Insecurity (12/22/2024)    Food Insecurity     Food Insecurity: Never true   Transportation Needs: No Transportation Needs (12/22/2024)    Transportation Needs     Lack of Transportation: No   Physical Activity: Insufficiently Active (9/24/2024)    Received from [facility]    Exercise Vital Sign     Days of Exercise per Week: 2 days     Minutes of Exercise per Session: 30 min   Stress: No Stress Concern Present (9/24/2024)    Received from [facility]    [place] [employer] Occupational Health - Occupational Stress Questionnaire     Feeling of Stress : Not at all   Social Connections: Moderately Isolated (9/24/2024)    Received from [facility]    Social Connection and Isolation Panel [NHANES]     Frequency of Communication with Friends and Family: Twice a week     Frequency of Social Gatherings with Friends and Family: Twice a week     Attends Religion Services: Never     Active Member of Clubs or Organizations: No     Attends Club or Organization Meetings: Never     Marital Status:    Housing Stability: Low Risk  (12/22/2024)    Housing Stability     Housing Instability: No        Current Medications:  Current Facility-Administered Medications   Medication Dose Route Frequency    sodium chloride 0.9 % irrigation 3,000 mL  3,000 mL Irrigation Continuous    glucose (Dex4) 15 GM/59ML oral liquid 15 g  15 g Oral Q15 Min PRN    Or    glucose (Glutose) 40% oral gel 15 g  15 g Oral Q15 Min PRN    Or    glucose-vitamin C (Dex-4) chewable tab 4 tablet  4 tablet Oral Q15 Min PRN    Or    dextrose 50% injection 50 mL  50 mL Intravenous Q15 Min PRN    Or     glucose (Dex4) 15 GM/59ML oral liquid 30 g  30 g Oral Q15 Min PRN    Or    glucose (Glutose) 40% oral gel 30 g  30 g Oral Q15 Min PRN    Or    glucose-vitamin C (Dex-4) chewable tab 8 tablet  8 tablet Oral Q15 Min PRN    insulin aspart (NovoLOG) 100 Units/mL FlexPen 1-7 Units  1-7 Units Subcutaneous TID CC    cefTRIAXone (Rocephin) 1 g in sodium chloride 0.9% 100 mL IVPB-ADDV  1 g Intravenous Q24H    acetaminophen (Tylenol Extra Strength) tab 500 mg  500 mg Oral Q4H PRN    acetaminophen (Tylenol) tab 650 mg  650 mg Oral Q4H PRN    Or    HYDROcodone-acetaminophen (Norco) 5-325 MG per tab 1 tablet  1 tablet Oral Q4H PRN    Or    HYDROcodone-acetaminophen (Norco) 5-325 MG per tab 2 tablet  2 tablet Oral Q4H PRN    ondansetron (Zofran) 4 MG/2ML injection 4 mg  4 mg Intravenous Q6H PRN    metoclopramide (Reglan) 5 mg/mL injection 10 mg  10 mg Intravenous Q8H PRN    polyethylene glycol (PEG 3350) (Miralax) 17 g oral packet 17 g  17 g Oral Daily PRN    sennosides (Senokot) tab 17.2 mg  17.2 mg Oral Nightly PRN    bisacodyl (Dulcolax) 10 MG rectal suppository 10 mg  10 mg Rectal Daily PRN    glycerin-hypromellose- (Artificial Tears) 0.2-0.2-1 % ophthalmic solution 1 drop  1 drop Both Eyes QID PRN    sodium chloride (Saline Mist) 0.65 % nasal solution 1 spray  1 spray Each Nare Q3H PRN    [START ON 12/23/2024] allopurinol (Zyloprim) tab 100 mg  100 mg Oral Daily    rosuvastatin (Crestor) tab 20 mg  20 mg Oral Nightly     Medications Prior to Admission   Medication Sig    tamsulosin 0.4 MG Oral Cap Take 1 capsule (0.4 mg total) by mouth daily.    furosemide 20 MG Oral Tab Take 1 tablet (20 mg total) by mouth every other day.    rosuvastatin 20 MG Oral Tab Take 1 tablet (20 mg total) by mouth nightly.    clopidogrel 75 MG Oral Tab Take 1 tablet (75 mg total) by mouth daily.    glimepiride 1 MG Oral Tab Half a tablet daily with breakfast    allopurinol 100 MG Oral Tab Take 1 tablet by mouth once daily    metFORMIN HCl ER  750 MG Oral Tablet 24 Hr Take 1 tablet (750 mg total) by mouth 2 (two) times daily with meals.    aspirin 81 MG Oral Tab EC Take by mouth at bedtime. take 1 tablet (81MG)  by oral route  every day    Glucosamine-Chondroitin (GLUCOSAMINE CHONDR COMPLEX OR) Take by mouth.    Glucose Blood (VINNIE CONTOUR TEST) In Vitro Strip USE ONE STRIP TO CHECK GLUCOSE TWICE DAILY    omega-3 fatty acids 1000 MG Oral Cap Take 1,000 mg by mouth daily.    Cholecalciferol 50 MCG (2000 UT) Oral Tab Take  by mouth. take 1 by Oral route  every day       Allergies  Allergies[1]    Review of Systems:    Pertinent items are noted in HPI.    Physical Exam:   Blood pressure 157/75, pulse 86, temperature 98.1 °F (36.7 °C), resp. rate 18, height 6' (1.829 m), weight 209 lb 4.8 oz (94.9 kg), SpO2 97%.    GENERAL: well developed, well nourished, in no apparent distress  HEENT: atraumatic, normocephalic  LUNGS: normal respiratory motion without distress  CARDIO:NA  Abd: Soft, non-tender, non-distended  : No SPT or CVAT, 22F 3 way on place with CBI on slow and yellow urine, irrigated and no clots      Results:     Laboratory Data:  Lab Results   Component Value Date    WBC 18.7 (H) 12/22/2024    HGB 13.5 12/22/2024    HCT 38.9 (L) 12/22/2024    .0 12/22/2024    CREATSERUM 0.84 12/22/2024    BUN 15 12/22/2024     (L) 12/22/2024    K 4.2 12/22/2024    CL 96 (L) 12/22/2024    CO2 24.0 12/22/2024     (H) 12/22/2024    CA 9.9 12/22/2024    ALB 3.8 03/23/2022    ALKPHO 66 03/23/2022    TP 7.1 03/23/2022    AST 23 03/23/2022    ALT 26 03/23/2022    INR 1.00 12/24/2022    PTP 13.2 12/24/2022    TSH 1.720 09/08/2021    PSA 2.0 08/23/2018    B12 650 08/08/2019         Imaging:  No results found.       73yo M with gross hematuria, UTI symptoms and BPH    Recommendations:  -No clots with irrigation. Keep CBI overnight. If clear in am can clamp and if stays clear will do TOV  -Hold plavix for now  -Agree with abx    Thank you for allowing me  to participate in the care of your patient.    Elvia Romano DO  12/22/2024         [1] No Known Allergies

## 2024-12-23 NOTE — PROGRESS NOTES
Pt is alert and oriented x4. On room air. Vital signs stable. Ambulating with 1 assist and a walker. Pt had a large bowel movement. Denies pain and nausea. On Carb controlled diet. Tolerating well. Cardiology and urology following. CBI clamped. Sanchez to be removed tomorrow before discharge home. Safety measures in place. Will continue to monitor.

## 2024-12-23 NOTE — PROGRESS NOTES
[facility]  part of [facility]     [facility] Hospitalist Progress Note     PCP: Ashlyn Fajardo MD    CC: Follow up       Assessment/Plan:   Martin Finley is a 74 year old male with PMH sig for major depressive disorder, type 2 diabetes, CAD status post stent in December 2023 with MCI, hyperlipidemia and BPH  who presented with sudden onset gross hematuria on 12/22/24.  Initially cleared with CBI but then hematuria resumed.  Called for admission, urology consulted.  Patient with known history of BPH and recently started on Flomax.  Coures complicated by fevers and Afib.  Ecoli on culture.  Cards consulted.       Gross hematuria, reported fevers and dysuria, polyuria  - UA not overwhelming for infection but does have elevated leuko, start ceftriaxone with follow-up cultures  - Continue CBI, monitor expected acute blood loss anemia, follow-up urology recommendations    Fever, afib with RVR, back in sinus  -presumed 2/2 Ecoli UTI, follow upsens  -cont ceftraixone, Blood cultures  -MCI consulted, Dr Corrales updated as pt follow with Dr Harris      CAD status post stent in December 2023 with MCI  - No signs of current chest pain or shortness of breath, patient is 1 year post stent okay to hold Plavix and aspirin temporarily.     Hyperlipidemia  - Continue statin     BPH  - Continue home med, see above     History of hyperkalemia, BMP pending  - Follows with Dr. Coon  consult if needed  -K was elevated but repeat K normal, montior     Chronic hyponatremia  -recently around 131-132,on admit 128, repeat 132, monitor      FEN: IV fluids as needed, lites in a.m.,low K diet     PPx: SCDs, no AC secondary to bleed     Dispo: Full code, pending course    Questions/concerns were discussed with patient and/or family by bedside.    Note: This chart was prepared using voice recognition software and may contain unintended word substitution errors.     Dw wife at bedside    Thank You,  Ava Avitia M.D.  [facility]  Hospitalist  Answering Service: [phone]        Subjective     Tamx 100.1, back in NSR, hematuria resolved  No CP, SOB, or N/V.      Objective     OBJECTIVE:  Temp:  [97.8 °F (36.6 °C)-100.1 °F (37.8 °C)] 98.5 °F (36.9 °C)  Pulse:  [] 81  Resp:  [16-18] 16  BP: ()/(44-96) 115/53  SpO2:  [94 %-97 %] 95 %    Intake/Output:    Intake/Output Summary (Last 24 hours) at 12/23/2024 1609  Last data filed at 12/23/2024 0600  Gross per 24 hour   Intake 1526.1 ml   Output 750 ml   Net 776.1 ml       Last 3 Weights   12/22/24 1650 209 lb 4.8 oz (94.9 kg)   12/22/24 1211 220 lb (99.8 kg)   12/12/23 0839 225 lb (102.1 kg)   12/20/22 0906 214 lb (97.1 kg)       Exam  Gen: No acute distress, alert and oriented x3  Neck Supple, no JVD  Pulm: Lungs clear, normal respiratory effort, No wheezing or crackles  CV: Heart with regular rate and rhythm, No murmurs, rubs, gallops  Abd: Abdomen soft, nontender, nondistended, no organomegaly, bowel sounds present  MSK:  no clubbing, no cyanosis.  No Lower extremity edema  Skin: no rashes or lesions, well perfused  Psych: mood stable, cooperative  Neuro: no focal deficits    Medications      tamsulosin  0.4 mg Oral Daily @ 0700    insulin aspart  1-7 Units Subcutaneous TID CC    cefTRIAXone  1 g Intravenous Q24H    allopurinol  100 mg Oral Daily    rosuvastatin  20 mg Oral Nightly      sodium chloride Stopped (12/22/24 1442)    sodium chloride 83 mL/hr at 12/22/24 2318       glucose **OR** glucose **OR** glucose-vitamin C **OR** dextrose **OR** glucose **OR** glucose **OR** glucose-vitamin C    acetaminophen    acetaminophen **OR** HYDROcodone-acetaminophen **OR** HYDROcodone-acetaminophen    ondansetron    metoclopramide    polyethylene glycol (PEG 3350)    sennosides    bisacodyl    glycerin-hypromellose-    sodium chloride    Data Review:       Labs:     Recent Labs   Lab 12/22/24  1607 12/23/24  0612   WBC 18.7* 24.5*   HGB 13.5 12.7*   MCV 93.7 94.1   .0 177.0    BAND  --  3       Recent Labs   Lab 12/22/24  1607 12/23/24  0612 12/23/24  1305   * 132*  --    K 4.2 5.4* 3.7   CL 96* 98  --    CO2 24.0 27.0  --    BUN 15 19  --    CREATSERUM 0.84 1.08  --    CA 9.9 9.4  --    MG  --  1.8  --    PHOS  --  4.0  --    * 143*  --        No results for input(s): \"ALT\", \"AST\", \"ALB\", \"AMYLASE\", \"LIPASE\", \"LDH\" in the last 168 hours.    Invalid input(s): \"ALPHOS\", \"TBIL\", \"DBIL\", \"TPROT\"    Recent Labs   Lab 12/22/24  1653 12/22/24  2107 12/22/24  2203 12/23/24  0814 12/23/24  1226   PGLU 125* 116* 129* 135* 133*       No results for input(s): \"TROP\" in the last 168 hours.    Imaging:  No results found.

## 2024-12-23 NOTE — PROGRESS NOTES
RRT    *See RRT Documentation Record*    Reason the RRT was called: New onset A-fib  Assessment of patient leading up to RRT: Tele called, Afib with HR in 110s to 120s.SpO2 93, /50. No complaints of chest pain, shortness of breath, etc.  Interventions/Testing: EKG, 500ml NS bolus, cardizem push by CCU RN, Cardiology put on consult  Patient Outcome/Disposition: Hr back down to the 90s-low 100s.  Family Notified: no  Name of family notified: Pt AxOx4, declined family notification, did not want to wake his daughter

## 2024-12-23 NOTE — HISTORICAL OFFICE NOTE
[facility]  [address], [address] [place] [address]  [phone]      Martin Finley  Progress Note  Demographics:  Name: Martin Finley YOB: 1950  Age: 74, Male Medical Record No: [id number]  Visited Date/Time: 12/04/2024 10:00 AM    Chief Complaints  Follow up   Pt states he is here for 6 month follow up.  History of Present Illness  73-year-old male being followed for coronary disease status post PCI OM and RCA, moderate nonobstructive LAD disease FFR borderline, now LAD PCI Dec 2023 with improvement in WADDELL, still mild residual WADDELL.  Also new bilateral neuropathy.    Current visit December 4 patient is doing well from a cardiac standpoint he is not having any chest pain or shortness of breath his main issue is neuropathy in his legs.  His last LDL was 45.  Cardiac risk factors Never smoked  Past Medical History  1.Preop testing  2.SOB (shortness of breath)  3.Abnormal stress test  4.CAD (coronary artery disease)  5.Bruit of right carotid artery  6.Hyperlipidemia, mixed  7.Essential hypertension  Family History  1. Father - No history of Heart disease  2. Mother - No history of Heart disease  Social History  Smoking status Never smoked  Tobacco usage - No (Non-smoker for personal reasons (finding))  Review of systems  Cardiovascular No history of Chest pain, WADDELL, Palpitations, Syncope, PND, Orthopnea, Edema and Claudication  Respiratory No history of SOB, Wheezing and Sputum  Hem/Lymphatic No history of Easy bruising, Blood clots, Hx of blood transfusion, Anemia and Bleeding problems  Physical Examination  Constitutional 97% o2  Vitals Left Arm Sitting  / 78 mmHg, Pulse rate 87 bpm, Height in 6', BMI: 29.6, Weight in 218 lbs (or) 99 kgs and BSA : 2.26 cc/m²  General Appearance Appropriate  Respiratory Lungs clear with normal breath sounds  Cardiovascular Normal and normal S1 and S2    Lower Extremities No edema  Allergies  No known medication  allergies.  Medications  1.allopurinoL 100 mg tablet, Take 1 tablet orally once a day.  2.aspirin 81 mg tablet,delayed release, Take 1 tablet orally once a day.  3.clopidogreL 75 mg tablet, Take 1 tablet orally once a day.  4.glimepiride 1 mg tablet, Take one-half tablet orally once a day.  5.glucosamine 750 mg-chondroitin 600 mg chewable tablet, Take 1 tablet orally 2 times a day.  6.Lasix 20 mg tablet, Take 1 tablet orally every other day  7.metFORMIN  mg tablet,extended release 24 hr, Take 1 tablet orally 2 times a day.  8.Omega-3 2100 1,050 mg-1,200 mg capsule, Take 1 capsule orally once a day.  9.rosuvastatin 20 mg tablet, Take 1 tablet orally once a day.  10.tamsulosin 0.4 mg capsule, Take 1 capsule orally once a day.  11.Vitamin D3 50 mcg (2,000 unit) capsule, Take 1 capsule orally once a day.  Impression  1.Bruit of right carotid artery  2.Hyperlipidemia, mixed  3.Essential hypertension  Assessment & Plan  Coronary artery disease PCI December of last year previously PCI in 2022 as well.  Is doing well from a cardiac standpoint encouraged him to be more active.  Last echocardiogram a year ago showed normal ejection fraction.  Will continue to monitor for symptoms    Neuropathy seems to be more of an issue than anything else.  I like him to visit with his primary doctor in regards to getting this treated.  He might be more active if this improves.    Hyperlipidemia LDL was 45 on statin we will continue    1. Continue same medications  2. see primary next week for bloodwork  3. follow up with Kimberly in 6 months  Future appointments  1.Referral Visit - Ashlyn Fajardo ([email or web address]) : (Today)  2.Follow up visit - Uli Harris MD (6 Months)  Miscellaneous  1.Weight monitoring (regime/therapy)  Nurses documentation  EKG: None  Refills: None   Upcoming surgeries: None  Use of assistive device(s): None      Patient instructions  1. Continue same medications  2. see primary next week  for bloodwork  3. follow up with Kimberly in 6 months  Please bring in you medication bottles or updated medicine list to your next appointment. Call [facility] if you have any problems or concerns at [phone].     Lab Details  POCT GLUCOSE  12/19/2023 09:45:30 AM  POCT GLUCOSE 152 70-99 mg/dL H F  BASIC METABOLIC PANEL (8)  12/08/2023 11:35:17 AM  GLUCOSE 163 70-99 mg/dL H F  SODIUM 136 136-145 mmol/L  F  POTASSIUM 4.5 3.5-5.1 mmol/L  F  CHLORIDE 102  mmol/L  F  CO2 29.0 21.0-32.0 mmol/L  F  ANION GAP 5 0-18 mmol/L  F  BUN 18 9-23 mg/dL  F  CREATININE 0.95 0.70-1.30 mg/dL  F  BUN/ CREAT RATIO 18.9 10.0-20.0  F  CALCIUM 9.5 8.7-10.4 mg/dL  F  OSMOLALITY CALCULATED 287 275-295 mOsm/kg  F  E GFR CR 84 >=60 mL/min/1.73m2  F  FASTING PATIENT BMP ANSWER No   F  CBC W/ DIFFERENTIAL  12/08/2023 11:02:37 AM  WBC 7.4 4.0-11.0 x10(3) uL  F  RED BLOOD COUNT 4.65 3.80-5.80 x10(6)uL  F  HGB 14.9 13.0-17.5 g/dL  F  HCT 44.5 39.0-53.0 %  F  MEAN CELL VOLUME 95.7 80.0-100.0 fL  F  MEAN CORPUSCULAR HEMOGLOBIN 32.0 26.0-34.0 pg  F  MEAN CORPUSCULAR HGB CONC 33.5 31.0-37.0 g/dL  F  RED CELL DISTRIBUTION WIDTH-SD 40.7 35.1-46.3 fL  F  RED CELL DISTRIBUTION WIDTH CV 11.7 11.0-15.0 %  F  PLATELETS 218.0 150.0-450.0 10(3)uL  F  NEUTROPHIL ABS PRELIM 4.47 1.50-7.70 x10 (3) uL  F  NEUTROPHIL ABSOLUTE 4.47 1.50-7.70 x10(3) uL  F  LYMPHOCYTE ABSOLUTE 2.18 1.00-4.00 x10(3) uL  F  MONOCYTE ABSOLUTE 0.47 0.10-1.00 x10(3) uL  F  EOSINOPHIL ABSOLUTE 0.16 0.00-0.70 x10(3) uL  F  BASOPHIL ABSOLUTE 0.04 0.00-0.20 x10(3) uL  F  IMMATURE GRANULOCYTE COUNT 0.03 0.00-1.00 x10(3) uL  F  NEUTROPHIL % 60.8 %  F  LYMPHOCYTE % 29.7 %  F  MONOCYTE % 6.4 %  F  EOSINOPHIL % 2.2 %  F  BASOPHIL % 0.5 %  F  IMMATURE GRANULOCYTE RATIO % 0.4 %  F  Diagnostics Details  Nuclear PET 10/20/2023  1.Stress EKG is normal.    2.No arrhythmias    3.No chest pain    1.This is an equivocal perfusion study. Fixed apical defect, distal left main calcium into LAD, Cx,  ramus    2.Small fixed perfusion abnormality of mild intensity in the apical segment.    3.The left ventricular cavity is noted to be normal on the stress studies. The stress left ventricular ejection fraction was calculated to be 72% and left ventricular global function is normal. The rest left ventricular cavity is noted to be normal. The rest left ventricular ejection fraction was calculated to be 69% and rest left ventricular global function is normal.    Trans Thoracic Echocardiogram 10/04/2023  1.The left ventricle is normal in size. The left ventricular wall thickness is normal. Global left ventricular systolic function is normal. The left ventricular ejection fraction is 55%. No significant wall motion abnormalities noted. Left ventricular diastolic function is normal.    2.The right ventricle is normal in size. Right ventricular systolic function is normal.    3.The absence of sufficient tricuspid regurgitation precludes estimation of PA pressure.    Care Providers: Savtia JOHNSON and Lashay HAMILTON  Electronically Authenticated by  Savita JOHNSON  12/04/2024 12:58:15 PM  Disclaimer: Components of this note were documented using voice recognition system and are subject to errors not corrected at proofreading. Contact the author of this note for any clarifications.

## 2024-12-23 NOTE — SPIRITUAL CARE NOTE
Spiritual Care Visit Note    Patient Name: Martin Finley Date of Spiritual Care Visit: 24   : 1950 Primary Dx: Hematuria, unspecified type       Referred By: Referral From: Care Coordination    Spiritual Care Taxonomy:    Intended Effects: Build relationship of care and support    Methods: Offer support    Interventions: Acknowledge current situation;Active listening;Silent prayer;Respond as  to a defined crisis event    Visit Type/Summary:     - Spiritual Care: Responded to a request via the on call phone Attempted visit. Patient is unavailable. Left a Spiritual Care contact information card. Family not present at time of code. Patient presents calm and cooperative while receiving care.  remains available as needed for follow up.    Spiritual Care support can be requested via an Epic consult. For urgent/immediate needs, please contact the On Call  at: [place]: ext [phone]    Rev Smita Gomez MDiv

## 2024-12-23 NOTE — PROGRESS NOTES
RRT called to room 565.    On my arrival patient lying in bed denies any complaints.  As per RN has been tachycardic through the day, placed on telemetry found to be in A-fib, no prior history.  Earlier presented with hematuria.  Patient denying any chest pain shortness of breath palpitations no focal numbness weakness or tingling.    On exam  Temperature 99.8  Pulse 130  Respiration 18  BP 94/71  Pulse ox 98% on 2 L nasal cannula  Alert and oriented in no distress  Chest clear  Heart tachycardic regular  Abdomen soft  Extremities no edema  No focal neurological deficit.    Assessment and plan    New onset atrial fibrillation with rapid ventricular response  EKG confirming atrial fibrillation, patient given a 500 mL bolus of normal saline followed by 10 mg IV push of Cardizem with improvement heart rate currently in the 80s.  Will continue monitor overnight, add TSH and 2D echo for a.m.  Consider cardiology consult.  Consider need for anticoagulation if remains in A-fib.    35 minutes of critical care time spent with patient occluding coordinating care with ancillary staff.

## 2024-12-24 VITALS
DIASTOLIC BLOOD PRESSURE: 60 MMHG | WEIGHT: 209.31 LBS | TEMPERATURE: 98 F | HEART RATE: 86 BPM | HEIGHT: 72 IN | RESPIRATION RATE: 18 BRPM | OXYGEN SATURATION: 95 % | BODY MASS INDEX: 28.35 KG/M2 | SYSTOLIC BLOOD PRESSURE: 132 MMHG

## 2024-12-24 LAB
ANION GAP SERPL CALC-SCNC: 6 MMOL/L (ref 0–18)
BASOPHILS # BLD AUTO: 0.03 X10(3) UL (ref 0–0.2)
BASOPHILS NFR BLD AUTO: 0.2 %
BUN BLD-MCNC: 11 MG/DL (ref 9–23)
BUN/CREAT SERPL: 15.7 (ref 10–20)
CALCIUM BLD-MCNC: 8.9 MG/DL (ref 8.7–10.4)
CHLORIDE SERPL-SCNC: 102 MMOL/L (ref 98–112)
CO2 SERPL-SCNC: 23 MMOL/L (ref 21–32)
CREAT BLD-MCNC: 0.7 MG/DL
DEPRECATED RDW RBC AUTO: 40.2 FL (ref 35.1–46.3)
EGFRCR SERPLBLD CKD-EPI 2021: 96 ML/MIN/1.73M2 (ref 60–?)
EOSINOPHIL # BLD AUTO: 0.06 X10(3) UL (ref 0–0.7)
EOSINOPHIL NFR BLD AUTO: 0.3 %
ERYTHROCYTE [DISTWIDTH] IN BLOOD BY AUTOMATED COUNT: 11.9 % (ref 11–15)
GLUCOSE BLD-MCNC: 152 MG/DL (ref 70–99)
GLUCOSE BLDC GLUCOMTR-MCNC: 141 MG/DL (ref 70–99)
HCT VFR BLD AUTO: 34.3 %
HGB BLD-MCNC: 11.9 G/DL
IMM GRANULOCYTES # BLD AUTO: 0.24 X10(3) UL (ref 0–1)
IMM GRANULOCYTES NFR BLD: 1.3 %
LYMPHOCYTES # BLD AUTO: 0.95 X10(3) UL (ref 1–4)
LYMPHOCYTES NFR BLD AUTO: 5 %
MAGNESIUM SERPL-MCNC: 1.9 MG/DL (ref 1.6–2.6)
MCH RBC QN AUTO: 32.1 PG (ref 26–34)
MCHC RBC AUTO-ENTMCNC: 34.7 G/DL (ref 31–37)
MCV RBC AUTO: 92.5 FL
MONOCYTES # BLD AUTO: 1.34 X10(3) UL (ref 0.1–1)
MONOCYTES NFR BLD AUTO: 7 %
NEUTROPHILS # BLD AUTO: 16.57 X10 (3) UL (ref 1.5–7.7)
NEUTROPHILS # BLD AUTO: 16.57 X10(3) UL (ref 1.5–7.7)
NEUTROPHILS NFR BLD AUTO: 86.2 %
OSMOLALITY SERPL CALC.SUM OF ELEC: 274 MOSM/KG (ref 275–295)
PLATELET # BLD AUTO: 174 10(3)UL (ref 150–450)
POTASSIUM SERPL-SCNC: 3.8 MMOL/L (ref 3.5–5.1)
RBC # BLD AUTO: 3.71 X10(6)UL
SODIUM SERPL-SCNC: 131 MMOL/L (ref 136–145)
WBC # BLD AUTO: 19.2 X10(3) UL (ref 4–11)

## 2024-12-24 PROCEDURE — 82962 GLUCOSE BLOOD TEST: CPT

## 2024-12-24 PROCEDURE — 85025 COMPLETE CBC W/AUTO DIFF WBC: CPT | Performed by: HOSPITALIST

## 2024-12-24 PROCEDURE — 83735 ASSAY OF MAGNESIUM: CPT | Performed by: HOSPITALIST

## 2024-12-24 PROCEDURE — 80048 BASIC METABOLIC PNL TOTAL CA: CPT | Performed by: HOSPITALIST

## 2024-12-24 RX ORDER — CEPHALEXIN 500 MG/1
500 CAPSULE ORAL 3 TIMES DAILY
Qty: 15 CAPSULE | Refills: 0 | Status: SHIPPED | OUTPATIENT
Start: 2024-12-24 | End: 2024-12-27

## 2024-12-24 RX ORDER — POLYETHYLENE GLYCOL 3350 17 G/17G
17 POWDER, FOR SOLUTION ORAL DAILY PRN
Qty: 30 EACH | Refills: 0 | Status: SHIPPED | COMMUNITY
Start: 2024-12-24

## 2024-12-24 NOTE — PLAN OF CARE
Per urology pt okay to discharge. Pt discharged home with family. All belongings returned to pt.     Problem: Patient Centered Care  Goal: Patient preferences are identified and integrated in the patient's plan of care  Description: Interventions:  - What would you like us to know as we care for you? I am from home with my wife  - Provide timely, complete, and accurate information to patient/family  - Incorporate patient and family knowledge, values, beliefs, and cultural backgrounds into the planning and delivery of care  - Encourage patient/family to participate in care and decision-making at the level they choose  - Honor patient and family perspectives and choices  12/24/2024 1301 by Sabine Fountain, RN  Outcome: Adequate for Discharge  12/24/2024 1020 by Sabine Fountain RN  Outcome: Progressing     Problem: Diabetes/Glucose Control  Goal: Glucose maintained within prescribed range  Description: INTERVENTIONS:  - Monitor Blood Glucose as ordered  - Assess for signs and symptoms of hyperglycemia and hypoglycemia  - Administer ordered medications to maintain glucose within target range  - Assess barriers to adequate nutritional intake and initiate nutrition consult as needed  - Instruct patient on self management of diabetes  12/24/2024 1301 by Sabine Fountain RN  Outcome: Adequate for Discharge  12/24/2024 1020 by Sabine Fountain RN  Outcome: Progressing     Problem: Patient/Family Goals  Goal: Patient/Family Long Term Goal  Description: Patient's Long Term Goal: Discharge    Interventions:  - Monitor vitals  - Monitor appropriate labs  - Administer medications as ordered  - Follow MD's orders  - Update patient on plan of care   - Discharge planning     - See additional Care Plan goals for specific interventions  12/24/2024 1301 by Sabine Fountain RN  Outcome: Adequate for Discharge  12/24/2024 1020 by Sabine Fountain RN  Outcome: Progressing  Goal: Patient/Family Short Term Goal  Description: Patient's  Short Term Goal:     Interventions:   -   - See additional Care Plan goals for specific interventions  12/24/2024 1301 by Sabine Fountain RN  Outcome: Adequate for Discharge  12/24/2024 1020 by Sabine Fountain RN  Outcome: Progressing     Problem: HEMATOLOGIC - ADULT  Goal: Maintains hematologic stability  Description: INTERVENTIONS  - Assess for signs and symptoms of bleeding or hemorrhage  - Monitor labs and vital signs for trends  - Administer supportive blood products/factors, fluids and medications as ordered and appropriate  - Administer supportive blood products/factors as ordered and appropriate  12/24/2024 1301 by Sabine Fountain RN  Outcome: Adequate for Discharge  12/24/2024 1020 by Sabine Fountain RN  Outcome: Progressing     Problem: PAIN - ADULT  Goal: Verbalizes/displays adequate comfort level or patient's stated pain goal  Description: INTERVENTIONS:  - Encourage pt to monitor pain and request assistance  - Assess pain using appropriate pain scale  - Administer analgesics based on type and severity of pain and evaluate response  - Implement non-pharmacological measures as appropriate and evaluate response  - Consider cultural and social influences on pain and pain management  - Manage/alleviate anxiety  - Utilize distraction and/or relaxation techniques  - Monitor for opioid side effects  - Notify MD/LIP if interventions unsuccessful or patient reports new pain  - Anticipate increased pain with activity and pre-medicate as appropriate  12/24/2024 1301 by Sabine Fountain RN  Outcome: Adequate for Discharge  12/24/2024 1020 by Sabine Fountain RN  Outcome: Progressing     Problem: RISK FOR INFECTION - ADULT  Goal: Absence of fever/infection during anticipated neutropenic period  Description: INTERVENTIONS  - Monitor WBC  - Administer growth factors as ordered  - Implement neutropenic guidelines  12/24/2024 1301 by Sabine Fountain RN  Outcome: Adequate for Discharge  12/24/2024 1020 by Александр  TRIP Regalado  Outcome: Progressing     Problem: SAFETY ADULT - FALL  Goal: Free from fall injury  Description: INTERVENTIONS:  - Assess pt frequently for physical needs  - Identify cognitive and physical deficits and behaviors that affect risk of falls.  - [place] fall precautions as indicated by assessment.  - Educate pt/family on patient safety including physical limitations  - Instruct pt to call for assistance with activity based on assessment  - Modify environment to reduce risk of injury  - Provide assistive devices as appropriate  - Consider OT/PT consult to assist with strengthening/mobility  - Encourage toileting schedule  12/24/2024 1301 by Sabine Fountain, RN  Outcome: Adequate for Discharge  12/24/2024 1020 by Sabine Fountain, RN  Outcome: Progressing     Problem: DISCHARGE PLANNING  Goal: Discharge to home or other facility with appropriate resources  Description: INTERVENTIONS:  - Identify barriers to discharge w/pt and caregiver  - Include patient/family/discharge partner in discharge planning  - Arrange for needed discharge resources and transportation as appropriate  - Identify discharge learning needs (meds, wound care, etc)  - Arrange for interpreters to assist at discharge as needed  - Consider post-discharge preferences of patient/family/discharge partner  - Complete POLST form as appropriate  - Assess patient's ability to be responsible for managing their own health  - Refer to Case Management Department for coordinating discharge planning if the patient needs post-hospital services based on physician/LIP order or complex needs related to functional status, cognitive ability or social support system  12/24/2024 1301 by Sabine Fountain, RN  Outcome: Adequate for Discharge  12/24/2024 1020 by Sabine Fountain, RN  Outcome: Progressing

## 2024-12-24 NOTE — DISCHARGE SUMMARY
[facility] Discharge Summary   Patient ID:  Martin Finley  [id number]  74 year old  9/12/1950    Admit date: 12/22/2024    Discharge date and time: 12/24/2024     Attending Physician: Francisco Eden MD     Primary Care Physician: Ashlyn Fajardo MD     Admit Dx: Hematuria, unspecified type [R31.9]      Discharge Diagnosis hematuria    Discharged Condition: stable    Disposition: home    Important follow up:  Sea Robb DO  [address]  [address]  [facility] [address]  [phone]    Go on 1/6/2025  8:30 am    Ashlyn Fajardo MD  [address]  [address]  [facility] [address] [phone]    Go on 12/27/2024  2:30 pm    Uli Harris MD  [address]  [facility] [address]  [phone]    Schedule an appointment as soon as possible for a visit in 1 day(s)          Please refer to prior H&P on admission date.    Hospital Course:    74 year old male with PMH sig for major depressive disorder, type 2 diabetes, CAD status post stent in December 2023 with MCI, hyperlipidemia and BPH  who presented with sudden onset gross hematuria on 12/22/24.  Initially cleared with CBI but then hematuria resumed.  Called for admission, urology consulted.  Patient with known history of BPH and recently started on Flomax.  Coures complicated by fevers and Afib.  Ecoli on culture.  Cards consulted.       Gross hematuria, reported fevers and dysuria, polyuria  - UA not overwhelming for infection but does have elevated leuko, start ceftriaxone with follow-up cultures  - sp CBI, monitor expected acute blood loss anemia, follow-up urology recommendations  - voiding trial this am cleared by urology for dc f/u as above      Fever, afib with RVR, back in sinus  -presumed 2/2 Ecoli UTI, follow upsens  -cont ceftraixone, Blood cultures  -MCI consulted, Dr Corrales updated as pt follow with Dr Harris   - no AC at dc f/u w cards to start when ok w uorlgoy     CAD status post stent in December 2023 with MCI  - No signs of  current chest pain or shortness of breath, patient is 1 year post stent okay to hold Plavix and aspirin temporarily. Hold at dc f/u w cards to resume when ok w urology      Hyperlipidemia  - Continue statin     BPH  - Continue home med, see above     History of hyperkalemia, BMP pending  - Follows with Dr. Coon  consult if needed  -K was elevated but repeat K normal, montior      Chronic hyponatremia  -recently around 131-132,on admit 128, repeat 132, monitor        Day of discharge Exam   Vitals:    12/24/24 0845   BP: 132/60   Pulse: 86   Resp: 18   Temp: 98.1 °F (36.7 °C)       Exam on day of discharge:  Gen: No acute distress  CV: RRR  Lungs: CTAB, good respiratory effort  Abdomen: s/nt/nd  Neuro: no focal deficits      Discharge meds     Medication List        START taking these medications      cephALEXin 500 MG Caps  Commonly known as: Keflex  Take 1 capsule (500 mg total) by mouth 3 (three) times daily for 5 days.     Polyethylene Glycol 3350 17 g Pack  Commonly known as: MIRALAX  Take 17 g by mouth daily as needed (If no bowel movement in last 24 hours).            CONTINUE taking these medications      allopurinol 100 MG Tabs  Commonly known as: Zyloprim  Take 1 tablet by mouth once daily     Marlys Contour Test Strp  USE ONE STRIP TO CHECK GLUCOSE TWICE DAILY     Cholecalciferol 50 MCG (2000 UT) Tabs     furosemide 20 MG Tabs  Commonly known as: Lasix     glimepiride 1 MG Tabs  Commonly known as: Amaryl  Half a tablet daily with breakfast     GLUCOSAMINE CHONDR COMPLEX OR     metFORMIN  MG Tb24  Commonly known as: Glucophage XR  Take 1 tablet (750 mg total) by mouth 2 (two) times daily with meals.     omega-3 fatty acids 1000 MG Caps  Commonly known as: Fish Oil     rosuvastatin 20 MG Tabs  Commonly known as: Crestor  Take 1 tablet (20 mg total) by mouth nightly.     tamsulosin 0.4 MG Caps  Commonly known as: Flomax            STOP taking these medications      aspirin 81 MG Tbec     clopidogrel 75  MG Tabs  Commonly known as: Plavix               Where to Get Your Medications        You can get these medications from any pharmacy    Bring a paper prescription for each of these medications  cephALEXin 500 MG Caps       Information about where to get these medications is not yet available    Ask your nurse or doctor about these medications  Polyethylene Glycol 3350 17 g Pack         Consults: IP CONSULT TO UROLOGY  IP CONSULT TO CARDIOLOGY  IP CONSULT TO CARDIOLOGY    Radiology: No results found.     Operative Procedures:        Total Time Coordinating Care: > than 30 minutes    Patient had opportunity to ask questions and state understand and agree with therapeutic plan as outlined      Francisco Eden MD  [facility] Hospitalist  [phone]  12/24/2024  1:30 PM

## 2024-12-24 NOTE — DISCHARGE INSTRUCTIONS
HOLD asa and plavix until ok from urology to start. Do not start eliquis for afib until ok from urology.

## 2024-12-24 NOTE — PROGRESS NOTES
[facility]  part of [facility]     Progress Note    Martin Finley Patient Status:  Inpatient    1950 MRN [id number]   Location [facility] 5SW/SE Attending Ava Avitia MD   Hosp Day # 2 PCP Ashlyn Fajardo MD     Subjective:  Martin Finley is a(n) 74 year old male.    Current  complaints: resting    Medications:  Current Facility-Administered Medications   Medication Dose Route Frequency    tamsulosin (Flomax) cap 0.4 mg  0.4 mg Oral Daily @ 0700    sodium chloride 0.9 % irrigation 3,000 mL  3,000 mL Irrigation Continuous    glucose (Dex4) 15 GM/59ML oral liquid 15 g  15 g Oral Q15 Min PRN    Or    glucose (Glutose) 40% oral gel 15 g  15 g Oral Q15 Min PRN    Or    glucose-vitamin C (Dex-4) chewable tab 4 tablet  4 tablet Oral Q15 Min PRN    Or    dextrose 50% injection 50 mL  50 mL Intravenous Q15 Min PRN    Or    glucose (Dex4) 15 GM/59ML oral liquid 30 g  30 g Oral Q15 Min PRN    Or    glucose (Glutose) 40% oral gel 30 g  30 g Oral Q15 Min PRN    Or    glucose-vitamin C (Dex-4) chewable tab 8 tablet  8 tablet Oral Q15 Min PRN    insulin aspart (NovoLOG) 100 Units/mL FlexPen 1-7 Units  1-7 Units Subcutaneous TID CC    cefTRIAXone (Rocephin) 1 g in sodium chloride 0.9% 100 mL IVPB-ADDV  1 g Intravenous Q24H    acetaminophen (Tylenol Extra Strength) tab 500 mg  500 mg Oral Q4H PRN    acetaminophen (Tylenol) tab 650 mg  650 mg Oral Q4H PRN    Or    HYDROcodone-acetaminophen (Norco) 5-325 MG per tab 1 tablet  1 tablet Oral Q4H PRN    Or    HYDROcodone-acetaminophen (Norco) 5-325 MG per tab 2 tablet  2 tablet Oral Q4H PRN    ondansetron (Zofran) 4 MG/2ML injection 4 mg  4 mg Intravenous Q6H PRN    metoclopramide (Reglan) 5 mg/mL injection 10 mg  10 mg Intravenous Q8H PRN    polyethylene glycol (PEG 3350) (Miralax) 17 g oral packet 17 g  17 g Oral Daily PRN    sennosides (Senokot) tab 17.2 mg  17.2 mg Oral Nightly PRN    bisacodyl (Dulcolax) 10 MG rectal suppository 10 mg  10  mg Rectal Daily PRN    glycerin-hypromellose- (Artificial Tears) 0.2-0.2-1 % ophthalmic solution 1 drop  1 drop Both Eyes QID PRN    sodium chloride (Saline Mist) 0.65 % nasal solution 1 spray  1 spray Each Nare Q3H PRN    allopurinol (Zyloprim) tab 100 mg  100 mg Oral Daily    rosuvastatin (Crestor) tab 20 mg  20 mg Oral Nightly    sodium chloride 0.9% infusion   Intravenous Continuous     Objective:  /70 (BP Location: Right arm)   Pulse 82   Temp 98.4 °F (36.9 °C) (Oral)   Resp 18   Ht 6' (1.829 m)   Wt 209 lb 4.8 oz (94.9 kg)   SpO2 96%   BMI 28.39 kg/m²     Intake/Output Summary (Last 24 hours) at 12/24/2024 0651  Last data filed at 12/24/2024 0601  Gross per 24 hour   Intake 1501.61 ml   Output 4150 ml   Net -2648.39 ml       General Appearance: Alert, cooperative, no distress, appears stated age  Head: Normocephalic, without obvious abnormality, atraumatic    Urine: Clear            Lab Results   Component Value Date    WBC 19.2 12/24/2024    HGB 11.9 12/24/2024    HCT 34.3 12/24/2024    .0 12/24/2024    CREATSERUM 0.70 12/24/2024    BUN 11 12/24/2024     12/24/2024    K 3.8 12/24/2024     12/24/2024    CO2 23.0 12/24/2024     12/24/2024    CA 8.9 12/24/2024          Assessment:  Hematuria  UTI    Plan:  E Coli UTI-continue p.o. antibiotics  -voiding trial today    Tali Coker MD  12/24/2024  6:51 AM

## 2024-12-24 NOTE — PROGRESS NOTES
[facility] - CARDIOLOGY PROGRESS NOTE      Martin Finley Patient Status:  Inpatient    1950 MRN [id number]   Location [facility] 5SW/SE Attending Ava Avitia MD   Hosp Day # 2 PCP Ashlyn Fajardo MD     Follows with Dr. Harris    Assessment and Plan:     IMPRESSIONS:  Gross hematuria, UTI  PAF, new diagnosis, asymptomatic. Back in SR  CAD s/p JIMMY OM and JIMMY RCA   HTN  HL, on statin        PLAN:  hold plavix. Hold ASA for hematuria  When ok for anticoagulation from hematuria perspective, eliquis 5 BID      Subjective:   Martin Finley is a(n) 74 year old male with no new c/o today, sleeping    Objective:   Blood pressure 141/70, pulse 82, temperature 98.4 °F (36.9 °C), temperature source Oral, resp. rate 18, height 6' (1.829 m), weight 209 lb 4.8 oz (94.9 kg), SpO2 96%.    Exam  Gen: No acute distress, alert and oriented   Neck:supple,no JVD  Pulm: Lungs ok, normal respiratory effort  CV: Heart with regular rate and rhythm,no pathologic murmur  Abd: Abdomen soft, nontender, nondistended,  bowel sounds present  Ext:  no clubbing, no cyanosis  Neuro: no focal deficits  Skin: no rashes or lesions      Scheduled Meds:    tamsulosin  0.4 mg Oral Daily @ 0700    insulin aspart  1-7 Units Subcutaneous TID CC    cefTRIAXone  1 g Intravenous Q24H    allopurinol  100 mg Oral Daily    rosuvastatin  20 mg Oral Nightly       Continuous Infusions:    sodium chloride Stopped (24 1442)    sodium chloride 83 mL/hr at 24 2346       Results:     Lab Results   Component Value Date    WBC 19.2 (H) 2024    HGB 11.9 (L) 2024    HCT 34.3 (L) 2024    .0 2024    CREATSERUM 0.70 2024    BUN 11 2024     (L) 2024    K 3.8 2024     2024    CO2 23.0 2024     (H) 2024    CA 8.9 2024    ALB 3.8 2022    ALKPHO 66 2022    BILT 0.5 2022    TP 7.1 2022    AST 23 2022    ALT 26  03/23/2022    INR 1.00 12/24/2022    TSH 1.720 09/08/2021    PSA 2.0 08/23/2018    MG 1.9 12/24/2024    PHOS 4.0 12/23/2024    B12 650 08/08/2019       No results found.  EKG 12 Lead    Result Date: 12/23/2024  Atrial fibrillation with rapid ventricular response Abnormal ECG No previous ECGs found in [facility] Confirmed by CATHI GRECO, KAMILAH ([id number]) on 12/23/2024 7:40:51 AM     Imaging: I independently visualized all relevant chest imaging in PACS, agree with radiology interpretation except where noted.    Adolfo Tapia MD  [facility]  Cardiac Electrophysiology  12/24/2024  2v

## 2024-12-24 NOTE — PLAN OF CARE
Problem: Patient Centered Care  Goal: Patient preferences are identified and integrated in the patient's plan of care  Description: Interventions:  - What would you like us to know as we care for you? I am from home with my wife  - Provide timely, complete, and accurate information to patient/family  - Incorporate patient and family knowledge, values, beliefs, and cultural backgrounds into the planning and delivery of care  - Encourage patient/family to participate in care and decision-making at the level they choose  - Honor patient and family perspectives and choices  Outcome: Progressing     Problem: Diabetes/Glucose Control  Goal: Glucose maintained within prescribed range  Description: INTERVENTIONS:  - Monitor Blood Glucose as ordered  - Assess for signs and symptoms of hyperglycemia and hypoglycemia  - Administer ordered medications to maintain glucose within target range  - Assess barriers to adequate nutritional intake and initiate nutrition consult as needed  - Instruct patient on self management of diabetes  Outcome: Progressing     Problem: Patient/Family Goals  Goal: Patient/Family Long Term Goal  Description: Patient's Long Term Goal: Discharge    Interventions:  - Monitor vitals  - Monitor appropriate labs  - Administer medications as ordered  - Follow MD's orders  - Update patient on plan of care   - Discharge planning     - See additional Care Plan goals for specific interventions  Outcome: Progressing  Goal: Patient/Family Short Term Goal  Description: Patient's Short Term Goal: free of bleeding in the urine     Interventions:   - Monitor vasquez  - See additional Care Plan goals for specific interventions  Outcome: Progressing     Problem: HEMATOLOGIC - ADULT  Goal: Maintains hematologic stability  Description: INTERVENTIONS  - Assess for signs and symptoms of bleeding or hemorrhage  - Monitor labs and vital signs for trends  - Administer supportive blood products/factors, fluids and medications  as ordered and appropriate  - Administer supportive blood products/factors as ordered and appropriate  Outcome: Progressing     Problem: PAIN - ADULT  Goal: Verbalizes/displays adequate comfort level or patient's stated pain goal  Description: INTERVENTIONS:  - Encourage pt to monitor pain and request assistance  - Assess pain using appropriate pain scale  - Administer analgesics based on type and severity of pain and evaluate response  - Implement non-pharmacological measures as appropriate and evaluate response  - Consider cultural and social influences on pain and pain management  - Manage/alleviate anxiety  - Utilize distraction and/or relaxation techniques  - Monitor for opioid side effects  - Notify MD/LIP if interventions unsuccessful or patient reports new pain  - Anticipate increased pain with activity and pre-medicate as appropriate  Outcome: Progressing     Problem: RISK FOR INFECTION - ADULT  Goal: Absence of fever/infection during anticipated neutropenic period  Description: INTERVENTIONS  - Monitor WBC  - Administer growth factors as ordered  - Implement neutropenic guidelines  Outcome: Progressing     Problem: SAFETY ADULT - FALL  Goal: Free from fall injury  Description: INTERVENTIONS:  - Assess pt frequently for physical needs  - Identify cognitive and physical deficits and behaviors that affect risk of falls.  - [place] fall precautions as indicated by assessment.  - Educate pt/family on patient safety including physical limitations  - Instruct pt to call for assistance with activity based on assessment  - Modify environment to reduce risk of injury  - Provide assistive devices as appropriate  - Consider OT/PT consult to assist with strengthening/mobility  - Encourage toileting schedule  Outcome: Progressing     Problem: DISCHARGE PLANNING  Goal: Discharge to home or other facility with appropriate resources  Description: INTERVENTIONS:  - Identify barriers to discharge w/pt and caregiver  -  Include patient/family/discharge partner in discharge planning  - Arrange for needed discharge resources and transportation as appropriate  - Identify discharge learning needs (meds, wound care, etc)  - Arrange for interpreters to assist at discharge as needed  - Consider post-discharge preferences of patient/family/discharge partner  - Complete POLST form as appropriate  - Assess patient's ability to be responsible for managing their own health  - Refer to Case Management Department for coordinating discharge planning if the patient needs post-hospital services based on physician/LIP order or complex needs related to functional status, cognitive ability or social support system  Outcome: Progressing

## 2024-12-25 ENCOUNTER — HOSPITAL ENCOUNTER (INPATIENT)
Facility: HOSPITAL | Age: 74
LOS: 2 days | Discharge: HOME | End: 2024-12-27
Attending: EMERGENCY MEDICINE | Admitting: HOSPITALIST
Payer: MEDICARE

## 2024-12-25 ENCOUNTER — APPOINTMENT (OUTPATIENT)
Dept: CT IMAGING | Facility: HOSPITAL | Age: 74
End: 2024-12-25
Attending: UROLOGY
Payer: MEDICARE

## 2024-12-25 DIAGNOSIS — E87.1 HYPONATREMIA: ICD-10-CM

## 2024-12-25 DIAGNOSIS — R31.9 HEMATURIA, UNSPECIFIED TYPE: Primary | ICD-10-CM

## 2024-12-25 LAB
ANION GAP SERPL CALC-SCNC: 8 MMOL/L (ref 0–18)
BASOPHILS # BLD AUTO: 0.03 X10(3) UL (ref 0–0.2)
BASOPHILS NFR BLD AUTO: 0.2 %
BUN BLD-MCNC: 13 MG/DL (ref 9–23)
BUN/CREAT SERPL: 17.1 (ref 10–20)
CALCIUM BLD-MCNC: 8.7 MG/DL (ref 8.7–10.4)
CHLORIDE SERPL-SCNC: 98 MMOL/L (ref 98–112)
CO2 SERPL-SCNC: 22 MMOL/L (ref 21–32)
CREAT BLD-MCNC: 0.76 MG/DL
DEPRECATED RDW RBC AUTO: 39.2 FL (ref 35.1–46.3)
EGFRCR SERPLBLD CKD-EPI 2021: 94 ML/MIN/1.73M2 (ref 60–?)
EOSINOPHIL # BLD AUTO: 0.12 X10(3) UL (ref 0–0.7)
EOSINOPHIL NFR BLD AUTO: 0.8 %
ERYTHROCYTE [DISTWIDTH] IN BLOOD BY AUTOMATED COUNT: 11.6 % (ref 11–15)
GLUCOSE BLD-MCNC: 165 MG/DL (ref 70–99)
GLUCOSE BLDC GLUCOMTR-MCNC: 134 MG/DL (ref 70–99)
GLUCOSE BLDC GLUCOMTR-MCNC: 138 MG/DL (ref 70–99)
GLUCOSE BLDC GLUCOMTR-MCNC: 193 MG/DL (ref 70–99)
GLUCOSE BLDC GLUCOMTR-MCNC: 259 MG/DL (ref 70–99)
HCT VFR BLD AUTO: 32.3 %
HGB BLD-MCNC: 11.5 G/DL
IMM GRANULOCYTES # BLD AUTO: 0.1 X10(3) UL (ref 0–1)
IMM GRANULOCYTES NFR BLD: 0.7 %
LYMPHOCYTES # BLD AUTO: 1.55 X10(3) UL (ref 1–4)
LYMPHOCYTES NFR BLD AUTO: 10.4 %
MCH RBC QN AUTO: 32.7 PG (ref 26–34)
MCHC RBC AUTO-ENTMCNC: 35.6 G/DL (ref 31–37)
MCV RBC AUTO: 91.8 FL
MONOCYTES # BLD AUTO: 1.27 X10(3) UL (ref 0.1–1)
MONOCYTES NFR BLD AUTO: 8.5 %
NEUTROPHILS # BLD AUTO: 11.87 X10 (3) UL (ref 1.5–7.7)
NEUTROPHILS # BLD AUTO: 11.87 X10(3) UL (ref 1.5–7.7)
NEUTROPHILS NFR BLD AUTO: 79.4 %
OSMOLALITY SERPL CALC.SUM OF ELEC: 270 MOSM/KG (ref 275–295)
PLATELET # BLD AUTO: 191 10(3)UL (ref 150–450)
POTASSIUM SERPL-SCNC: 3.9 MMOL/L (ref 3.5–5.1)
RBC # BLD AUTO: 3.52 X10(6)UL
SODIUM SERPL-SCNC: 128 MMOL/L (ref 136–145)
WBC # BLD AUTO: 14.9 X10(3) UL (ref 4–11)

## 2024-12-25 PROCEDURE — 80048 BASIC METABOLIC PNL TOTAL CA: CPT | Performed by: EMERGENCY MEDICINE

## 2024-12-25 PROCEDURE — BT14YZZ FLUOROSCOPY OF KIDNEYS, URETERS AND BLADDER USING OTHER CONTRAST: ICD-10-PCS | Performed by: STUDENT IN AN ORGANIZED HEALTH CARE EDUCATION/TRAINING PROGRAM

## 2024-12-25 PROCEDURE — 96360 HYDRATION IV INFUSION INIT: CPT

## 2024-12-25 PROCEDURE — 99285 EMERGENCY DEPT VISIT HI MDM: CPT

## 2024-12-25 PROCEDURE — 82962 GLUCOSE BLOOD TEST: CPT

## 2024-12-25 PROCEDURE — 74178 CT ABD&PLV WO CNTR FLWD CNTR: CPT | Performed by: UROLOGY

## 2024-12-25 PROCEDURE — 85025 COMPLETE CBC W/AUTO DIFF WBC: CPT | Performed by: EMERGENCY MEDICINE

## 2024-12-25 PROCEDURE — 51700 IRRIGATION OF BLADDER: CPT

## 2024-12-25 RX ORDER — NICOTINE POLACRILEX 4 MG
30 LOZENGE BUCCAL
Status: DISCONTINUED | OUTPATIENT
Start: 2024-12-25 | End: 2024-12-27

## 2024-12-25 RX ORDER — ALLOPURINOL 100 MG/1
100 TABLET ORAL DAILY
Status: DISCONTINUED | OUTPATIENT
Start: 2024-12-25 | End: 2024-12-27

## 2024-12-25 RX ORDER — DEXTROSE MONOHYDRATE 25 G/50ML
50 INJECTION, SOLUTION INTRAVENOUS
Status: DISCONTINUED | OUTPATIENT
Start: 2024-12-25 | End: 2024-12-27

## 2024-12-25 RX ORDER — FUROSEMIDE 20 MG/1
20 TABLET ORAL EVERY OTHER DAY
Status: DISCONTINUED | OUTPATIENT
Start: 2024-12-26 | End: 2024-12-27

## 2024-12-25 RX ORDER — ACETAMINOPHEN 500 MG
500 TABLET ORAL EVERY 4 HOURS PRN
Status: DISCONTINUED | OUTPATIENT
Start: 2024-12-25 | End: 2024-12-27

## 2024-12-25 RX ORDER — NICOTINE POLACRILEX 4 MG
15 LOZENGE BUCCAL
Status: DISCONTINUED | OUTPATIENT
Start: 2024-12-25 | End: 2024-12-27

## 2024-12-25 RX ORDER — POLYETHYLENE GLYCOL 3350 17 G/17G
17 POWDER, FOR SOLUTION ORAL DAILY PRN
Status: DISCONTINUED | OUTPATIENT
Start: 2024-12-25 | End: 2024-12-27

## 2024-12-25 RX ORDER — MAGNESIUM HYDROXIDE 1200 MG/15ML
3000 LIQUID ORAL CONTINUOUS
Status: DISCONTINUED | OUTPATIENT
Start: 2024-12-25 | End: 2024-12-27

## 2024-12-25 RX ORDER — ROSUVASTATIN CALCIUM 20 MG/1
20 TABLET, COATED ORAL NIGHTLY
Status: DISCONTINUED | OUTPATIENT
Start: 2024-12-25 | End: 2024-12-27

## 2024-12-25 RX ORDER — CEPHALEXIN 500 MG/1
500 CAPSULE ORAL 3 TIMES DAILY
Status: DISCONTINUED | OUTPATIENT
Start: 2024-12-25 | End: 2024-12-27

## 2024-12-25 RX ORDER — ONDANSETRON 2 MG/ML
4 INJECTION INTRAMUSCULAR; INTRAVENOUS EVERY 6 HOURS PRN
Status: DISCONTINUED | OUTPATIENT
Start: 2024-12-25 | End: 2024-12-27

## 2024-12-25 RX ORDER — LIDOCAINE HYDROCHLORIDE 20 MG/ML
10 JELLY TOPICAL ONCE
Status: COMPLETED | OUTPATIENT
Start: 2024-12-25 | End: 2024-12-25

## 2024-12-25 RX ORDER — GABAPENTIN 100 MG/1
100 CAPSULE ORAL 2 TIMES DAILY
Status: DISCONTINUED | OUTPATIENT
Start: 2024-12-25 | End: 2024-12-27

## 2024-12-25 RX ORDER — TAMSULOSIN HYDROCHLORIDE 0.4 MG/1
0.4 CAPSULE ORAL DAILY
Status: DISCONTINUED | OUTPATIENT
Start: 2024-12-25 | End: 2024-12-27

## 2024-12-25 RX ORDER — METOCLOPRAMIDE HYDROCHLORIDE 5 MG/ML
10 INJECTION INTRAMUSCULAR; INTRAVENOUS EVERY 8 HOURS PRN
Status: DISCONTINUED | OUTPATIENT
Start: 2024-12-25 | End: 2024-12-27

## 2024-12-25 RX ORDER — GABAPENTIN 100 MG/1
100 CAPSULE ORAL 2 TIMES DAILY
COMMUNITY
Start: 2024-12-10

## 2024-12-25 NOTE — PLAN OF CARE
Problem: Diabetes/Glucose Control  Goal: Glucose maintained within prescribed range  Description: INTERVENTIONS:  - Monitor Blood Glucose as ordered  - Assess for signs and symptoms of hyperglycemia and hypoglycemia  - Administer ordered medications to maintain glucose within target range  - Assess barriers to adequate nutritional intake and initiate nutrition consult as needed  - Instruct patient on self management of diabetes  12/25/2024 1630 by Rod Ruiz RN  Outcome: Progressing  12/25/2024 0842 by Rod Ruiz RN  Outcome: Progressing     Problem: Patient Centered Care  Goal: Patient preferences are identified and integrated in the patient's plan of care  Description: Interventions:  - What would you like us to know as we care for you? To be comfortable  - Provide timely, complete, and accurate information to patient/family  - Incorporate patient and family knowledge, values, beliefs, and cultural backgrounds into the planning and delivery of care  - Encourage patient/family to participate in care and decision-making at the level they choose  - Honor patient and family perspectives and choices  12/25/2024 1630 by Rod Ruiz RN  Outcome: Progressing  12/25/2024 0842 by Rod Ruiz RN  Outcome: Progressing

## 2024-12-25 NOTE — H&P
[facility] HISTORY AND PHYSICAL     CC:   Chief Complaint   Patient presents with    Bleeding        PCP: Ashlyn Fajardo MD    History of Present Illness:   Patient is a 74 year old M known from yesterday admitted 12/22 to 12/24 returns with hematuria. Hx CAD w stents (2022, Dec 2023) on plavix held at discharge 12/24, afib noted last admit not on AC, also BPH, DM, MDD, HL presenting w gross hematuria 12/22, noted with fever and given abx  for UTI, sp CBI with resolution of hematuria- tolerated voiding trial 12/24 and discharged to home off plavix and not on AC for new onset afib w RVR noted on admit (plan to start when ok w urology seen by his cardiology team during the admission). Spouse who is present at bedside reports he came home and in the middle of the night had a an episode of large volume hematuria prompting them to come back in. + pain w voiding no current abd/ suprapubic pain. On CBI w clear urine in the bag.      Past Medical History:    Amblyopia     Right Eye     Anxiety    Cataracts, bilateral    per NG    Cellulitis of left foot    Depression    Diabetes mellitus type 2 without retinopathy (HCC)    per NG    History of hepatitis A virus infection    per NG    MGD (meibomian gland dysfunction)    per NG    Other and unspecified hyperlipidemia    per NG    Type II or unspecified type diabetes mellitus without mention of complication, not stated as uncontrolled    per NG    Unspecified essential hypertension    per NG    Vitreous floaters of right eye    per NG      Past Surgical History:   Procedure Laterality Date    Back surgery      Colonoscopy  10/2007        ALL:  Allergies[1]          Social History     Tobacco Use    Smoking status: Never    Smokeless tobacco: Never   Substance Use Topics    Alcohol use: Not Currently     Comment: 1 beer/month        Fam Hx  Family History   Problem Relation Age of Onset    Other (Hernia complications) Father 50        Cause of death; per NG    Diabetes  Sister         per NG    Glaucoma Neg     Macular degeneration Neg        Review of Systems  10 point Comprehensive ROS reviewed and negative except for what's stated above.     OBJECTIVE:  /72 (BP Location: Right arm)   Pulse 67   Temp 97.7 °F (36.5 °C) (Oral)   Resp 16   Wt 209 lb 3.5 oz (94.9 kg)   SpO2 98%   BMI 28.37 kg/m²     GEN: NAD, AAO  NECK: supple, no LAD  HEENT- sclera anti-icteric, OP- MMM  CV: rrr, +s1/s2, PMI non displaced  LUNGS: CTAB, normal resp effort  ABL soft, NT/ND, NABS, no HSC  EXT: no LE edema b/l , DP pulses 2+ b/l  Neuro: sensation intact, no focal deficits   clear yellow urine   SKIN- no rashes, no lesion  PSYCH- normal mentation, normal affect      LABS:   Lab Results   Component Value Date    WBC 14.9 12/25/2024    HGB 11.5 12/25/2024    HCT 32.3 12/25/2024    .0 12/25/2024    CREATSERUM 0.76 12/25/2024    BUN 13 12/25/2024     12/25/2024    K 3.9 12/25/2024    CL 98 12/25/2024    CO2 22.0 12/25/2024     12/25/2024    CA 8.7 12/25/2024    PGLU 138 12/25/2024       Radiology: No results found.       ASSESSMENT / PLAN:  74YOM w CAD sp stents prev on plavix held on recent admit, new afib not on AC, DM, BPH, MDD, gout, HL here 12/22 to 12/24 w hematuria sp CBI and abx for  infection course also w new afib w RVR returns 12/25 w recurrent hematuria.    Gross hematuria with ABLA  - recurrent admit 12/22 to 12/24 w same sp CBI- resolved, voiding trial dc  w/o vasquez and on abx/ off blood thinners  - readmit 12/25 w hematuria hg 11.5 fm 11.9  - urology  cs:   - cont abx for Ecoli bacturia/ UTI from  last admit  -  On CBI currently clear urine in bag w urology planning CT urogram  - follow hg  - hold all blood thinners    Leukocytosis  - downtrended from last admit  - cont keflex - Ecoli S to keflex ucx  12/22    CAD sp stents  - sp JIMMY to OM and RCA Dec  2022 and most recently JIMMY to LAD Dec 19 2023 w Uli Harris from [facility]  - holding plavix w recurrent  hematuria    Afbi  - noted last admit  - not yet started on AC for stroke ppx see above      DM  - SSI PRN in house    BPH  - flomax, gu on cs    Chronic hyponatremia  - bsl  lower 130s    Est dc tbd    Outpatient records or previous hospital records reviewed as detailed above.    ROXANA hospitalist to continue to follow patient while in house      ROXANA Toro Hospitalist  Pager [phone]    12/25/2024  8:08 AM         [1] No Known Allergies

## 2024-12-25 NOTE — PLAN OF CARE
Patient is alert and oriented times 4. Admitted from ED to room 419. Is on Continuous bladder irrigation. Spouse at bedside.   Problem: Patient Centered Care  Goal: Patient preferences are identified and integrated in the patient's plan of care  Description: Interventions:  - What would you like us to know as we care for you? To not have blood in my urine  - Provide timely, complete, and accurate information to patient/family  - Incorporate patient and family knowledge, values, beliefs, and cultural backgrounds into the planning and delivery of care  - Encourage patient/family to participate in care and decision-making at the level they choose  - Honor patient and family perspectives and choices  Outcome: Progressing     Problem: Diabetes/Glucose Control  Goal: Glucose maintained within prescribed range  Description: INTERVENTIONS:  - Monitor Blood Glucose as ordered  - Assess for signs and symptoms of hyperglycemia and hypoglycemia  - Administer ordered medications to maintain glucose within target range  - Assess barriers to adequate nutritional intake and initiate nutrition consult as needed  - Instruct patient on self management of diabetes  Outcome: Progressing

## 2024-12-25 NOTE — ED QUICK NOTES
Orders for admission, patient is aware of plan and ready to go upstairs. Any questions, please call ED RN Hien at extension [phone].     Patient Covid vaccination status: Fully vaccinated     COVID Test Ordered in ED: None    COVID Suspicion at Admission: N/A    Running Infusions:    sodium chloride          Mental Status/LOC at time of transport: A&Ox3    Other pertinent information:   CIWA score: N/A   NIH score:  N/A

## 2024-12-25 NOTE — ED PROVIDER NOTES
Patient Seen in: [facility] Emergency Department      History     Chief Complaint   Patient presents with    Bleeding     Stated Complaint: Hematuria    Subjective:   HPI      Patient is a 74-year-old male who was just discharged earlier today for hematuria and presents again with same.  Patient has not been on his anticoagulation since discharge.  He is on antibiotic for UTI.  He had catheter removed in the hospital after bladder irrigation.  No fevers.  No abdominal pain.  He states he went home and he had a little bit of blood in his urine throughout the day but woke up tonight with large amount of blood.  No clots.    Objective:     Past Medical History:    Amblyopia     Right Eye     Anxiety    Cataracts, bilateral    per NG    Cellulitis of left foot    Depression    Diabetes mellitus type 2 without retinopathy (HCC)    per NG    History of hepatitis A virus infection    per NG    MGD (meibomian gland dysfunction)    per NG    Other and unspecified hyperlipidemia    per NG    Type II or unspecified type diabetes mellitus without mention of complication, not stated as uncontrolled    per NG    Unspecified essential hypertension    per NG    Vitreous floaters of right eye    per NG              Past Surgical History:   Procedure Laterality Date    Back surgery      Colonoscopy  10/2007                Social History     Socioeconomic History    Marital status:    Tobacco Use    Smoking status: Never    Smokeless tobacco: Never   Vaping Use    Vaping status: Never Used   Substance and Sexual Activity    Alcohol use: Not Currently     Comment: 1 beer/month    Drug use: No    Sexual activity: Not Currently     Partners: Female   Other Topics Concern    Caffeine Concern Yes     Comment: Coffee, 2 cups; per NG    Reaction to local anesthetic No     Social Drivers of Health     Financial Resource Strain: Low Risk  (9/24/2024)    Received from [facility]    Overall Financial Resource Strain  (CARDIA)     Difficulty of Paying Living Expenses: Not hard at all   Food Insecurity: No Food Insecurity (12/22/2024)    Food Insecurity     Food Insecurity: Never true   Transportation Needs: No Transportation Needs (12/22/2024)    Transportation Needs     Lack of Transportation: No   Physical Activity: Insufficiently Active (9/24/2024)    Received from [facility]    Exercise Vital Sign     Days of Exercise per Week: 2 days     Minutes of Exercise per Session: 30 min   Stress: No Stress Concern Present (9/24/2024)    Received from [facility]    [place] [employer] Occupational [employer] - Occupational Stress Questionnaire     Feeling of Stress : Not at all   Social Connections: Moderately Isolated (9/24/2024)    Received from [facility]    Social Connection and Isolation Panel [NHANES]     Frequency of Communication with Friends and Family: Twice a week     Frequency of Social Gatherings with Friends and Family: Twice a week     Attends Voodoo Services: Never     Active Member of Clubs or Organizations: No     Attends Club or Organization Meetings: Never     Marital Status:    Housing Stability: Low Risk  (12/22/2024)    Housing Stability     Housing Instability: No                  Physical Exam     ED Triage Vitals [12/25/24 0231]   /66   Pulse 103   Resp 20   Temp 98.2 °F (36.8 °C)   Temp src Temporal   SpO2 97 %   O2 Device None (Room air)       Current Vitals:   Vital Signs  BP: 119/75  Pulse: 79  Resp: 17  Temp: 98.2 °F (36.8 °C)  Temp src: Temporal  MAP (mmHg): 87    Oxygen Therapy  SpO2: 94 %  O2 Device: None (Room air)        Physical Exam  GENERAL: No acute distress, awake and alert  HEENT: EOMI, PERRL  Neck: supple  CV: RRR, no murmurs  Resp: CTAB, no wheezes or retractions  Ab: soft, nontender, no distension  : +large amount of red blood in diaper and at penile meatus  Extremities: FROM of all extremities  Neuro: CN intact, 5/5 motor strength in all  extremities, no focal deficits  SKIN: warm, dry, no rashes      ED Course     Labs Reviewed   BASIC METABOLIC PANEL (8) - Abnormal; Notable for the following components:       Result Value    Glucose 165 (*)     Sodium 128 (*)     Calculated Osmolality 270 (*)     All other components within normal limits   CBC WITH DIFFERENTIAL WITH PLATELET - Abnormal; Notable for the following components:    WBC 14.9 (*)     RBC 3.52 (*)     HGB 11.5 (*)     HCT 32.3 (*)     Neutrophil Absolute Prelim 11.87 (*)     Neutrophil Absolute 11.87 (*)     Monocyte Absolute 1.27 (*)     All other components within normal limits          MDM      Medical Decision Making  Labs remarkable for low sodium  Ivf started  Cbi initiated  Advised of re-admission for large amount of bleeding in diaper      Amount and/or Complexity of Data Reviewed  External Data Reviewed: labs and notes.     Details: Recent urine culture, discharge summary, labs reviewed from this week  Labs: ordered.  Discussion of management or test interpretation with external provider(s): D/w dr Ruiz hospitalist  Dr Coker urology notified    Risk  Decision regarding hospitalization.        Disposition and Plan     Clinical Impression:  1. Hematuria, unspecified type    2. Hyponatremia         Disposition:  Admit  12/25/2024  5:07 am    Follow-up:  No follow-up provider specified.  We recommend that you schedule follow up care with a primary care provider within the next three months to obtain basic health screening including reassessment of your blood pressure.      Medications Prescribed:  Current Discharge Medication List              Supplementary Documentation:         Hospital Problems       Present on Admission  Date Reviewed: 2/1/2024            ICD-10-CM Noted POA    * (Principal) Hematuria, unspecified type R31.9 12/22/2024 Unknown

## 2024-12-25 NOTE — CONSULTS
[facility] ADULT [facility]ORY [facility]ICAL      IDENTIFYING DATA  Patient is Martin pulido 74 year old male. MRN is [id number]    Admitting physician: Francisco Eden MD  Primary care physician: Ashlyn Fajardo MD    CHIEF COMPLAINT  Chief Complaint   Patient presents with    Bleeding             HISTORY OF PRESENT ILLNESS  74 year old male presents with gross hematuria which recurred at 2 AM.  Patient was just discharged yesterday after an admission with gross hematuria in the setting of E coli UTI.  Says he was taking his antibiotics as instructed.  Urine was clear when  he first got home yesterday but then when he went to void overnight he says there was \"a lot\" of blood.  Says there was some burning too.  Came into ER where vasquez was placed and CBI started.  Takes plavix and aspirin but these were held since last admission few days ago.    PAST UROLOGICAL HISTORY BY ORGAN SYSTEM  See HPI    PAST MEDICAL HISTORY  Past Medical History:    Amblyopia     Right Eye     Anxiety    Cataracts, bilateral    per NG    Cellulitis of left foot    Depression    Diabetes mellitus type 2 without retinopathy (HCC)    per NG    History of hepatitis A virus infection    per NG    MGD (meibomian gland dysfunction)    per NG    Other and unspecified hyperlipidemia    per NG    Type II or unspecified type diabetes mellitus without mention of complication, not stated as uncontrolled    per NG    Unspecified essential hypertension    per NG    Vitreous floaters of right eye    per NG             PAST SURGICAL HISTORY  Past Surgical History:   Procedure Laterality Date    Back surgery      Colonoscopy  10/2007       PAST FAMILY HISTORY  Family History   Problem Relation Age of Onset    Other (Hernia complications) Father 50        Cause of death; per NG    Diabetes Sister         per NG    Glaucoma Neg     Macular degeneration Neg        PAST SOCIAL HISTORY  Social History     Socioeconomic History    Marital status:      Spouse  name: Not on file    Number of children: Not on file    Years of education: Not on file    Highest education level: Not on file   Occupational History    Not on file   Tobacco Use    Smoking status: Never    Smokeless tobacco: Never   Vaping Use    Vaping status: Never Used   Substance and Sexual Activity    Alcohol use: Not Currently     Comment: 1 beer/month    Drug use: No    Sexual activity: Not Currently     Partners: Female   Other Topics Concern     Service Not Asked    Blood Transfusions Not Asked    Caffeine Concern Yes     Comment: Coffee, 2 cups; per NG    Occupational Exposure Not Asked    Hobby Hazards Not Asked    Sleep Concern Not Asked    Stress Concern Not Asked    Weight Concern Not Asked    Special Diet Not Asked    Back Care Not Asked    Exercise Not Asked    Bike Helmet Not Asked    Seat Belt Not Asked    Self-Exams Not Asked    Grew up on a farm Not Asked    History of tanning Not Asked    Outdoor occupation Not Asked    Reaction to local anesthetic No   Social History Narrative    Not on file     Social Drivers of Health     Financial Resource Strain: Low Risk  (9/24/2024)    Received from [facility]    Overall Financial Resource Strain (CARDIA)     Difficulty of Paying Living Expenses: Not hard at all   Food Insecurity: No Food Insecurity (12/25/2024)    Food Insecurity     Food Insecurity: Never true   Transportation Needs: No Transportation Needs (12/25/2024)    Transportation Needs     Lack of Transportation: No     Car Seat: Not on file   Physical Activity: Insufficiently Active (9/24/2024)    Received from [facility]    Exercise Vital Sign     Days of Exercise per Week: 2 days     Minutes of Exercise per Session: 30 min   Stress: No Stress Concern Present (9/24/2024)    Received from [facility]    [place] of Occupational Health - Occupational Stress Questionnaire     Feeling of Stress : Not at all   Social Connections: Moderately Isolated  (9/24/2024)    Received from [facility]    Social Connection and Isolation Panel [NHANES]     Frequency of Communication with Friends and Family: Twice a week     Frequency of Social Gatherings with Friends and Family: Twice a week     Attends Faith Services: Never     Active Member of Clubs or Organizations: No     Attends Club or Organization Meetings: Never     Marital Status:    Housing Stability: Low Risk  (12/25/2024)    Housing Stability     Housing Instability: No     Housing Instability Emergency: Not on file     Crib or Bassinette: Not on file       MEDICATIONS  No current outpatient medications on file.    ALLERGIES  Allergies[1]       REVIEW OF SYSTEMS  Constitutional symptoms:(-) fever, (-) chills (-) headache  Eyes: (-) blurred vision, (-) double vision   Neurological: (-) tremors, (-) dizzy spells  (-) numbness/tingling  Endocrine: (-) feeling too hot/cold (-)  tired/sluggish  Gastrointestinal:(-)  abdominal pain   (-) nausea/vomiting (-) indigestion  Cardiovascular: (-) chest pain, (-) palpitations (-) HTN  Integumentary: (-)  skin rash (-) persistent itch  Musculoskeletal: (-) joint pain, (-) neck pain (-) back pain  Ear/Nose/Throat/Mouth:  (-) sore throat (-) sinus problems  Genitourinary:see HPI  Respiratory:  (-) problems  wheezing (-) frequent cough (-) SOB (-) WADDELL  Hematologic/Lymphatic: (-)  swollen glands (-) blood clotting problem    PHYSICAL EXAMINATIONS    Vital Signs:   Vitals:    12/25/24 0630 12/25/24 0715 12/25/24 0740 12/25/24 0845   BP: 112/62 101/60 118/72    BP Location:   Right arm    Pulse: 65 75 67    Resp: 20 20 16    Temp:   97.7 °F (36.5 °C)    TempSrc:   Oral    SpO2: 95% 96% 98%    Weight:    209 lb 3.5 oz (94.9 kg)       Constitutional: General appearance: appears well, alert and oriented x 3, pleasant and cooperative.  Neuro: No gross deficits  Skin: Normal color, turgor  HEENT: Neck supple  Chest: Normal respiratory effort  CV: Normal radial  pulse  Abdomen: Soft without tenderness, guarding. No suprapubic tenderness or fullness  No CVA tenderness bilaterally  Extremities: No edema appreciated.  Vasquez draining yellow urine      REVIEW OF LABORATORY, PATHOLOGY, AND RADIOLOGY DATA    CBC w/DIF:   Lab Results   Component Value Date    WBC 14.9 (H) 12/25/2024    RBC 3.52 (L) 12/25/2024    HGB 11.5 (L) 12/25/2024    HCT 32.3 (L) 12/25/2024    MCV 91.8 12/25/2024    MCH 32.7 12/25/2024    MCHC 35.6 12/25/2024    RDW 11.6 12/25/2024    .0 12/25/2024    MPV 7.3 (L) 08/23/2018       Urine culture:  12/22/24/ E coli      ASSESSMENT AND PLAN   74 year old male admitted several days ago with complex UTI and gross hematuria.  Discharged home yesterday after which he developed recurrence of gross hematuria overnight.  Now with vasquez on CBI.  Urine is clear yellow.  Clamp CBI.  Continue antibiotics for E coli UTI (12/22/24).  CT urogram ordered.    Thank you for this consult.    Michelle Marie MD - covering for [facility] for holiday.  They will resume care on 12/26  [facility]   O: [phone]               [1] No Known Allergies

## 2024-12-26 LAB
ANION GAP SERPL CALC-SCNC: 4 MMOL/L (ref 0–18)
BASOPHILS # BLD AUTO: 0.04 X10(3) UL (ref 0–0.2)
BASOPHILS NFR BLD AUTO: 0.4 %
BUN BLD-MCNC: 10 MG/DL (ref 9–23)
BUN/CREAT SERPL: 12.7 (ref 10–20)
CALCIUM BLD-MCNC: 9 MG/DL (ref 8.7–10.4)
CHLORIDE SERPL-SCNC: 101 MMOL/L (ref 98–112)
CO2 SERPL-SCNC: 27 MMOL/L (ref 21–32)
CREAT BLD-MCNC: 0.79 MG/DL
DEPRECATED RDW RBC AUTO: 39.6 FL (ref 35.1–46.3)
EGFRCR SERPLBLD CKD-EPI 2021: 93 ML/MIN/1.73M2 (ref 60–?)
EOSINOPHIL # BLD AUTO: 0.28 X10(3) UL (ref 0–0.7)
EOSINOPHIL NFR BLD AUTO: 2.8 %
ERYTHROCYTE [DISTWIDTH] IN BLOOD BY AUTOMATED COUNT: 11.6 % (ref 11–15)
GLUCOSE BLD-MCNC: 154 MG/DL (ref 70–99)
GLUCOSE BLDC GLUCOMTR-MCNC: 156 MG/DL (ref 70–99)
GLUCOSE BLDC GLUCOMTR-MCNC: 164 MG/DL (ref 70–99)
GLUCOSE BLDC GLUCOMTR-MCNC: 186 MG/DL (ref 70–99)
GLUCOSE BLDC GLUCOMTR-MCNC: 218 MG/DL (ref 70–99)
HCT VFR BLD AUTO: 32.6 %
HGB BLD-MCNC: 11.3 G/DL
IMM GRANULOCYTES # BLD AUTO: 0.08 X10(3) UL (ref 0–1)
IMM GRANULOCYTES NFR BLD: 0.8 %
LYMPHOCYTES # BLD AUTO: 1.94 X10(3) UL (ref 1–4)
LYMPHOCYTES NFR BLD AUTO: 19.6 %
MCH RBC QN AUTO: 32.2 PG (ref 26–34)
MCHC RBC AUTO-ENTMCNC: 34.7 G/DL (ref 31–37)
MCV RBC AUTO: 92.9 FL
MONOCYTES # BLD AUTO: 0.94 X10(3) UL (ref 0.1–1)
MONOCYTES NFR BLD AUTO: 9.5 %
NEUTROPHILS # BLD AUTO: 6.6 X10 (3) UL (ref 1.5–7.7)
NEUTROPHILS # BLD AUTO: 6.6 X10(3) UL (ref 1.5–7.7)
NEUTROPHILS NFR BLD AUTO: 66.9 %
OSMOLALITY SERPL CALC.SUM OF ELEC: 276 MOSM/KG (ref 275–295)
PLATELET # BLD AUTO: 211 10(3)UL (ref 150–450)
POTASSIUM SERPL-SCNC: 4.2 MMOL/L (ref 3.5–5.1)
RBC # BLD AUTO: 3.51 X10(6)UL
SODIUM SERPL-SCNC: 132 MMOL/L (ref 136–145)
WBC # BLD AUTO: 9.9 X10(3) UL (ref 4–11)

## 2024-12-26 PROCEDURE — 82962 GLUCOSE BLOOD TEST: CPT

## 2024-12-26 PROCEDURE — 85025 COMPLETE CBC W/AUTO DIFF WBC: CPT | Performed by: HOSPITALIST

## 2024-12-26 PROCEDURE — 80048 BASIC METABOLIC PNL TOTAL CA: CPT | Performed by: HOSPITALIST

## 2024-12-26 NOTE — PROGRESS NOTES
[facility]     [facility] Hospitalist Progress Note     PCP: Ashlyn Fajardo MD    CC: Follow up       Assessment/Plan:     74YOM w CAD sp stents prev on plavix held on recent admit, new afib not on AC, DM, BPH, MDD, gout, HL here 12/22 to 12/24 w hematuria sp CBI and abx for  infection course also w new afib w RVR returns 12/25 w recurrent hematuria.     Gross hematuria with ABLA  - recurrent admit 12/22 to 12/24 w same sp CBI- resolved, voiding trial dc  w/o vasquez and on abx/ off blood thinners  - readmit 12/25 w hematuria hg 11.5 fm 11.9->11.3  - urology  cs:   - cont abx for Ecoli bacturia/ UTI from  last admit  -  On CBI currently clear urine in bag w urology planning CT urogram  - follow hg  - hold all blood thinners  -Vasquez was taken out, when flushed did have some blood in urine, no clots, discussed with patient and prefer to monitor     Leukocytosis  - downtrended from last admit  - cont keflex - Ecoli S to keflex ucx  12/22     CAD sp stents  - sp JIMMY to OM and RCA Dec  2022 and most recently JIMMY to LAD Dec 19 2023 w Uli Kimberly from [facility]  - holding plavix w recurrent hematuria     Afbi  - noted last admit  - not yet started on AC for stroke ppx see above        DM  - SSI PRN in house     BPH  - flomax, gu on cs     Chronic hyponatremia  - bsl  lower 130s, stable     FEN: IVF prn, lytes in Am, Dm diet    PPX; SCD,     Dispo: pending clinical course    Questions/concerns were discussed with patient and/or family by bedside.    Note: This chart was prepared using voice recognition software and may contain unintended word substitution errors.         Thank You,  Ava Avitia M.D.  [facility] [facility]  Answering Service: [phone]        Subjective     CBI cleared but then he had some blood but voiding, vasquez is out, will monitor overnight  No CP, SOB, or N/V.      Objective     OBJECTIVE:  Temp:  [97.8 °F (36.6 °C)-98.6 °F (37 °C)] 98.1 °F (36.7 °C)  Pulse:  [76-85]  77  Resp:  [18-20] 18  BP: (125-142)/(67-84) 142/81  SpO2:  [97 %-100 %] 100 %    Intake/Output:    Intake/Output Summary (Last 24 hours) at 12/26/2024 1525  Last data filed at 12/26/2024 1450  Gross per 24 hour   Intake --   Output 4750 ml   Net -4750 ml       Last 3 Weights   12/26/24 0753 208 lb 15.9 oz (94.8 kg)   12/25/24 0845 209 lb 3.5 oz (94.9 kg)   12/25/24 0231 209 lb 3.5 oz (94.9 kg)   12/22/24 1650 209 lb 4.8 oz (94.9 kg)   12/22/24 1211 220 lb (99.8 kg)   12/12/23 0839 225 lb (102.1 kg)       Exam  Gen: No acute distress, alert and oriented x3  Neck Supple, no JVD  Pulm: Lungs clear, normal respiratory effort, No wheezing or crackles  CV: Heart with regular rate and rhythm, No murmurs, rubs, gallops  Abd: Abdomen soft, nontender, nondistended, no organomegaly, bowel sounds present  MSK:  no clubbing, no cyanosis.  No Lower extremity edema  Skin: no rashes or lesions, well perfused  Psych: mood stable, cooperative  Neuro: no focal deficits    Medications      allopurinol  100 mg Oral Daily    furosemide  20 mg Oral QOD    cephALEXin  500 mg Oral TID    rosuvastatin  20 mg Oral Nightly    tamsulosin  0.4 mg Oral Daily    insulin aspart  1-5 Units Subcutaneous TID CC    gabapentin  100 mg Oral BID      sodium chloride         acetaminophen    ondansetron    metoclopramide    polyethylene glycol (PEG 3350)    glucose **OR** glucose **OR** glucose-vitamin C **OR** dextrose **OR** glucose **OR** glucose **OR** glucose-vitamin C    Data Review:       Labs:     Recent Labs   Lab 12/22/24  1607 12/23/24  0612 12/24/24  0523 12/25/24  0439 12/26/24  0416   WBC 18.7* 24.5* 19.2* 14.9* 9.9   HGB 13.5 12.7* 11.9* 11.5* 11.3*   MCV 93.7 94.1 92.5 91.8 92.9   .0 177.0 174.0 191.0 211.0   BAND  --  3  --   --   --        Recent Labs   Lab 12/22/24  1607 12/23/24  0612 12/23/24  1305 12/24/24  0523 12/25/24  0439 12/26/24  0416   * 132*  --  131* 128* 132*   K 4.2 5.4* 3.7 3.8 3.9 4.2   CL 96* 98  --  102  98 101   CO2 24.0 27.0  --  23.0 22.0 27.0   BUN 15 19  --  11 13 10   CREATSERUM 0.84 1.08  --  0.70 0.76 0.79   CA 9.9 9.4  --  8.9 8.7 9.0   MG  --  1.8  --  1.9  --   --    PHOS  --  4.0  --   --   --   --    * 143*  --  152* 165* 154*       No results for input(s): \"ALT\", \"AST\", \"ALB\", \"AMYLASE\", \"LIPASE\", \"LDH\" in the last 168 hours.    Invalid input(s): \"ALPHOS\", \"TBIL\", \"DBIL\", \"TPROT\"    Recent Labs   Lab 12/25/24  1218 12/25/24  1655 12/25/24  2110 12/26/24  0805 12/26/24  1205   PGLU 259* 134* 193* 156* 164*       No results for input(s): \"TROP\" in the last 168 hours.    Imaging:  CT UROGRAM (W+WO) (CPT=74178)    Result Date: 12/26/2024  PROCEDURE: CT UROGRAM (W+WO)  (CPT=74178)  COMPARISON: None.  INDICATIONS: gross hematuria  TECHNIQUE:   CT images of the abdomen and pelvis were obtained without and with non-ionic intravenous contrast material. Automated exposure control for dose reduction was used. Adjustment of the mA and/or kV was done based on the patient's size. Use of iterative reconstruction technique for dose reduction was used.  Dose information is transmitted to the ACR (American College of Radiology) NRDR (National Radiology Data Registry) which includes the Dose Index Registry.  FINDINGS:  LOWER CHEST: Small linear consolidation in the left lung base, which likely reflects atelectasis/scarring (3:37).  There are additional minimal areas of atelectasis/scarring in the remainder of the lung bases.  There are scattered areas of peripheral reticulations.  The heart is within normal limits of size.  There is conspicuity of the myocardium when compared to the blood pool, which can be seen in the setting of anemia.  Triple-vessel coronary artery calcifications.  No pericardial effusion.  Mild  bilateral gynecomastia.  HEPATOBILIARY:   No enlargement, atrophy, abnormal density, or significant focal lesion.  There is a punctate left hepatic lobe calcification (2:35) The gallbladder is  unremarkable. No intrahepatic or extrahepatic biliary ductal dilatation.  SPLEEN:   No enlargement or focal lesion.   PANCREAS:   Scattered fatty atrophy of the pancreas.  No pancreatic mass or ductal dilatation.  ADRENALS:   No mass or enlargement.   GENITOURINARY:   No hydronephrosis.  No renal calculus.  No enhancing renal mass.  There are subcentimeter bilateral renal sinus cysts.  Mild bilateral perirenal fat stranding.  No ureteral calculus.  No ureteral lesion is identified.  There are few small areas of peristalsis involving the left mid ureter.  There is a well-positioned Sanchez catheter.  There is minimal postprocedural air within the urinary bladder.  Mild circumferential bladder wall thickening.  There is mild perivesicular fat stranding.  GI TRACT:    No bowel obstruction.  No abnormal bowel wall thickening.  The appendix is unremarkable.  No acute appendicitis.  There is colonic diverticulosis without evidence of acute diverticulitis.  PELVIC ORGANS: The prostate is enlarged and measures 5.3 cm in the transverse plane (5:140).  AORTA/VASCULAR:   No aneurysm or dissection.  There is atherosclerotic calcification of the abdominal aorta and its branching vessels.  RETROPERITONEUM:   No mass or enlarged adenopathy.   PERITONEUM: No pneumoperitoneum or ascites.  LYMPH NODES: No evidence of lymphadenopathy.   BONES:  No acute fracture. No aggressive osseous lesion.  There is dextrocurvature of the lumbar spine.  Multilevel degenerative changes of the lower thoracic and lumbar spine.  Mild-to-moderate degenerative change involving the osseous pelvis.  OTHER:   Left greater than right fat containing inguinal hernias.          CONCLUSION:   1. No hydronephrosis, urinary calculus, enhancing renal mass, or ureteral lesion. 2. Circumferential bladder wall thickening with mild perivesicular fat stranding, which may be secondary to underdistention, cystitis, and/or chronic outlet obstruction.  3. Well-positioned  Sanchez catheter with expected minimal postprocedural air in the urinary bladder. 4. Prostatomegaly. 5. Lesser incidental findings described above.    No significant change when compared to the preliminary radiology report from [facility].   Dictated by (CST): Dale Clark MD on 12/26/2024 at 9:17 AM     Finalized by (CST): Dale Clark MD on 12/26/2024 at 9:25 AM

## 2024-12-26 NOTE — PROGRESS NOTES
[facility]  part of [facility]    Progress Note    Martin Finley Patient Status:  Inpatient    1950 MRN [id number]   Location [facility] 4W/SW/SE Attending Ava Avitia MD   Hosp Day # 1 PCP Ashlyn Fajardo MD     Subjective:   Martin Finley is a(n) 74 year old male with gross hematuria 2/2 UTI and BPH. Doing much better urine clear    Objective:   Blood pressure 125/84, pulse 78, temperature 97.8 °F (36.6 °C), temperature source Oral, resp. rate 20, weight 208 lb 15.9 oz (94.8 kg), SpO2 97%.    GENERAL: well developed, well nourished, in no apparent distress  HEENT: atraumatic, normocephalic  LUNGS: normal respiratory motion without distress  CARDIO:NA  Abd: Soft, non-tender, non-distended  : No SPT or CVAT, 22F 3-way in place w yellow urine and CBI clamped      Results:   Lab Results   Component Value Date    WBC 9.9 2024    HGB 11.3 (L) 2024    HCT 32.6 (L) 2024    .0 2024    CREATSERUM 0.79 2024    BUN 10 2024     (L) 2024    K 4.2 2024     2024    CO2 27.0 2024     (H) 2024    CA 9.0 2024    ALB 3.8 2022    ALKPHO 66 2022    BILT 0.5 2022    TP 7.1 2022    AST 23 2022    ALT 26 2022    INR 1.00 2022    TSH 1.720 2021    PSA 2.0 2018    MG 1.9 2024    PHOS 4.0 2024    B12 650 2019       CT UROGRAM (W+WO) (CPT=74178)    Result Date: 2024  CONCLUSION:   1. No hydronephrosis, urinary calculus, enhancing renal mass, or ureteral lesion. 2. Circumferential bladder wall thickening with mild perivesicular fat stranding, which may be secondary to underdistention, cystitis, and/or chronic outlet obstruction.  3. Well-positioned Sanchez catheter with expected minimal postprocedural air in the urinary bladder. 4. Prostatomegaly. 5. Lesser incidental findings described above.    No significant change when  compared to the preliminary radiology report from [facility].   Dictated by (CST): Dale Clark MD on 12/26/2024 at 9:17 AM     Finalized by (CST): Dale Clark MD on 12/26/2024 at 9:25 AM               Assessment & Plan:     74 year old male admitted several days ago with complex UTI and gross hematuria.  Discharged home after which he developed recurrence of gross hematuria overnight.      CT urogram reviewed and only notable for enlarged prostate    Plan:  -Trial of void today and urine has remained clear w CBI clamped   -Told patient I would prefer to leave catheter for a couple of days but he refused  -Will need outpatient cysto        Elvia Romano, DO  12/26/2024

## 2024-12-26 NOTE — PLAN OF CARE
Alert and oriented x4. Admitted for CBI, bleeding. 1 assist walker. CBI clamped. Urine is yellow and clear. ACHS. Sanchez in place. Home once medically cleared  Problem: Patient Centered Care  Goal: Patient preferences are identified and integrated in the patient's plan of care  Description: Interventions:  - What would you like us to know as we care for you? Home with spouse  - Provide timely, complete, and accurate information to patient/family  - Incorporate patient and family knowledge, values, beliefs, and cultural backgrounds into the planning and delivery of care  - Encourage patient/family to participate in care and decision-making at the level they choose  - Honor patient and family perspectives and choices  12/26/2024 0121 by Last Posada RN  Outcome: Progressing  12/26/2024 0121 by Last Posada RN  Outcome: Progressing     Problem: Diabetes/Glucose Control  Goal: Glucose maintained within prescribed range  Description: INTERVENTIONS:  - Monitor Blood Glucose as ordered  - Assess for signs and symptoms of hyperglycemia and hypoglycemia  - Administer ordered medications to maintain glucose within target range  - Assess barriers to adequate nutritional intake and initiate nutrition consult as needed  - Instruct patient on self management of diabetes  12/26/2024 0121 by Last Posada RN  Outcome: Progressing  12/26/2024 0121 by Last Posada RN  Outcome: Progressing     Problem: Patient/Family Goals  Goal: Patient/Family Long Term Goal  Description: Patient's Long Term Goal: pain management    Interventions:  - pain administration  - See additional Care Plan goals for specific interventions  12/26/2024 0121 by Last Posada RN  Outcome: Progressing  12/26/2024 0121 by Last Posada RN  Outcome: Progressing  Goal: Patient/Family Short Term Goal  Description: Patient's Short Term Goal: safe ambulation    Interventions:   - calling for assistance out of bed  - See additional  Care Plan goals for specific interventions  12/26/2024 0121 by Last Posada RN  Outcome: Progressing  12/26/2024 0121 by Last Posada RN  Outcome: Progressing     Problem: PAIN - ADULT  Goal: Verbalizes/displays adequate comfort level or patient's stated pain goal  Description: INTERVENTIONS:  - Encourage pt to monitor pain and request assistance  - Assess pain using appropriate pain scale  - Administer analgesics based on type and severity of pain and evaluate response  - Implement non-pharmacological measures as appropriate and evaluate response  - Consider cultural and social influences on pain and pain management  - Manage/alleviate anxiety  - Utilize distraction and/or relaxation techniques  - Monitor for opioid side effects  - Notify MD/LIP if interventions unsuccessful or patient reports new pain  - Anticipate increased pain with activity and pre-medicate as appropriate  Outcome: Progressing     Problem: RISK FOR INFECTION - ADULT  Goal: Absence of fever/infection during anticipated neutropenic period  Description: INTERVENTIONS  - Monitor WBC  - Administer growth factors as ordered  - Implement neutropenic guidelines  Outcome: Progressing     Problem: SAFETY ADULT - FALL  Goal: Free from fall injury  Description: INTERVENTIONS:  - Assess pt frequently for physical needs  - Identify cognitive and physical deficits and behaviors that affect risk of falls.  - [place] fall precautions as indicated by assessment.  - Educate pt/family on patient safety including physical limitations  - Instruct pt to call for assistance with activity based on assessment  - Modify environment to reduce risk of injury  - Provide assistive devices as appropriate  - Consider OT/PT consult to assist with strengthening/mobility  - Encourage toileting schedule  Outcome: Progressing     Problem: DISCHARGE PLANNING  Goal: Discharge to home or other facility with appropriate resources  Description: INTERVENTIONS:  - Identify  barriers to discharge w/pt and caregiver  - Include patient/family/discharge partner in discharge planning  - Arrange for needed discharge resources and transportation as appropriate  - Identify discharge learning needs (meds, wound care, etc)  - Arrange for interpreters to assist at discharge as needed  - Consider post-discharge preferences of patient/family/discharge partner  - Complete POLST form as appropriate  - Assess patient's ability to be responsible for managing their own health  - Refer to Case Management Department for coordinating discharge planning if the patient needs post-hospital services based on physician/LIP order or complex needs related to functional status, cognitive ability or social support system  Outcome: Progressing

## 2024-12-26 NOTE — PLAN OF CARE
Patient vasquez removed. Noted blood in urine. Urology aware. Received communication that urine should get cleared with each void. Keep monitoring the urine color.   Problem: PAIN - ADULT  Goal: Verbalizes/displays adequate comfort level or patient's stated pain goal  Description: INTERVENTIONS:  - Encourage pt to monitor pain and request assistance  - Assess pain using appropriate pain scale  - Administer analgesics based on type and severity of pain and evaluate response  - Implement non-pharmacological measures as appropriate and evaluate response  - Consider cultural and social influences on pain and pain management  - Manage/alleviate anxiety  - Utilize distraction and/or relaxation techniques  - Monitor for opioid side effects  - Notify MD/LIP if interventions unsuccessful or patient reports new pain  - Anticipate increased pain with activity and pre-medicate as appropriate  Outcome: Progressing     Problem: RISK FOR INFECTION - ADULT  Goal: Absence of fever/infection during anticipated neutropenic period  Description: INTERVENTIONS  - Monitor WBC  - Administer growth factors as ordered  - Implement neutropenic guidelines  Outcome: Progressing

## 2024-12-27 VITALS
HEART RATE: 72 BPM | OXYGEN SATURATION: 97 % | DIASTOLIC BLOOD PRESSURE: 68 MMHG | BODY MASS INDEX: 28 KG/M2 | SYSTOLIC BLOOD PRESSURE: 133 MMHG | WEIGHT: 208 LBS | TEMPERATURE: 98 F | RESPIRATION RATE: 18 BRPM

## 2024-12-27 LAB
GLUCOSE BLDC GLUCOMTR-MCNC: 142 MG/DL (ref 70–99)
GLUCOSE BLDC GLUCOMTR-MCNC: 166 MG/DL (ref 70–99)
GLUCOSE BLDC GLUCOMTR-MCNC: 211 MG/DL (ref 70–99)

## 2024-12-27 PROCEDURE — 82962 GLUCOSE BLOOD TEST: CPT

## 2024-12-27 RX ORDER — CEPHALEXIN 500 MG/1
500 CAPSULE ORAL 3 TIMES DAILY
Qty: 15 CAPSULE | Refills: 0 | Status: SHIPPED | OUTPATIENT
Start: 2024-12-27 | End: 2025-01-01

## 2024-12-27 NOTE — PROGRESS NOTES
[facility]  part of [facility]     [facility] Hospitalist Progress Note     PCP: Ashlyn Fajardo MD    CC: Follow up       Assessment/Plan:     74YOM w CAD sp stents prev on plavix held on recent admit, new afib not on AC, DM, BPH, MDD, gout, HL here 12/22 to 12/24 w hematuria sp CBI and abx for  infection course also w new afib w RVR returns 12/25 w recurrent hematuria.  Overall is improved but now has small clots with normal urine.  Urology had recommended Vasquez at discharge and patient did not want.  Awaiting urology recommendations as patient is nervous with the clots in the urine.     Gross hematuria with ABLA  - recurrent admit 12/22 to 12/24 w same sp CBI- resolved, voiding trial dc  w/o vasquez and on abx/ off blood thinners  - readmit 12/25 w hematuria hg 11.5 fm 11.9->11.3  - urology  cs:   - cont abx for Ecoli bacturia/ UTI from  last admit  -  On CBI currently clear urine in bag w urology planning CT urogram  - follow hg  - hold all blood thinners  -Vasquez was taken out, when flushed did have some blood in urine, no clots, discussed with patient and prefer to monitor.  Overall appears to be improving, await urology input as he is at risk for going home.     Leukocytosis  - downtrended from last admit  - cont keflex - Ecoli S to keflex ucx  12/22     CAD sp stents  - sp JIMMY to OM and RCA Dec  2022 and most recently JIMMY to LAD Dec 19 2023 w Uli Harris from [facility]  - holding plavix w recurrent hematuria     Afbi  - noted last admit  - not yet started on AC for stroke ppx see above        DM  - SSI PRN in house     BPH  - flomax, gu on cs     Chronic hyponatremia  - bsl  lower 130s, stable     FEN: IVF prn, lytes in Am, Dm diet    PPX; SCD,     Dispo: pending clinical course    Questions/concerns were discussed with patient and/or family by bedside.    Note: This chart was prepared using voice recognition software and may contain unintended word substitution errors.         Thank You,  Ava Villalta  ARABELLA Avitia.  [facility] Hospitalist  Answering Service: [phone]        Subjective     Overall he will have a few clots at the initial output and then urine clears.  Does not appear to be worsening.  Await urology input.  Home pending course.  No chest pain or shortness of breath.      Objective     OBJECTIVE:  Temp:  [97.6 °F (36.4 °C)-98.1 °F (36.7 °C)] 97.6 °F (36.4 °C)  Pulse:  [72-80] 72  Resp:  [18-20] 18  BP: (109-152)/(54-81) 133/68  SpO2:  [96 %-100 %] 97 %    Intake/Output:    Intake/Output Summary (Last 24 hours) at 12/27/2024 1418  Last data filed at 12/27/2024 1200  Gross per 24 hour   Intake --   Output 3300 ml   Net -3300 ml       Last 3 Weights   12/27/24 0803 208 lb (94.3 kg)   12/26/24 0753 208 lb 15.9 oz (94.8 kg)   12/25/24 0845 209 lb 3.5 oz (94.9 kg)   12/25/24 0231 209 lb 3.5 oz (94.9 kg)   12/22/24 1650 209 lb 4.8 oz (94.9 kg)   12/22/24 1211 220 lb (99.8 kg)   12/12/23 0839 225 lb (102.1 kg)       Exam  Gen: No acute distress, alert and oriented x3  Neck Supple, no JVD  Pulm: Lungs clear, normal respiratory effort, No wheezing or crackles  CV: Heart with regular rate and rhythm, No murmurs, rubs, gallops  Abd: Abdomen soft, nontender, nondistended, no organomegaly, bowel sounds present  MSK:  no clubbing, no cyanosis.  No Lower extremity edema  Skin: no rashes or lesions, well perfused  Psych: mood stable, cooperative  Neuro: no focal deficits    Medications      allopurinol  100 mg Oral Daily    furosemide  20 mg Oral QOD    cephALEXin  500 mg Oral TID    rosuvastatin  20 mg Oral Nightly    tamsulosin  0.4 mg Oral Daily    insulin aspart  1-5 Units Subcutaneous TID CC    gabapentin  100 mg Oral BID      sodium chloride         acetaminophen    ondansetron    metoclopramide    polyethylene glycol (PEG 3350)    glucose **OR** glucose **OR** glucose-vitamin C **OR** dextrose **OR** glucose **OR** glucose **OR** glucose-vitamin C    Data Review:       Labs:     Recent Labs   Lab 12/22/24  2185  12/23/24  0612 12/24/24  0523 12/25/24  0439 12/26/24  0416   WBC 18.7* 24.5* 19.2* 14.9* 9.9   HGB 13.5 12.7* 11.9* 11.5* 11.3*   MCV 93.7 94.1 92.5 91.8 92.9   .0 177.0 174.0 191.0 211.0   BAND  --  3  --   --   --        Recent Labs   Lab 12/22/24  1607 12/23/24  0612 12/23/24  1305 12/24/24  0523 12/25/24  0439 12/26/24  0416   * 132*  --  131* 128* 132*   K 4.2 5.4* 3.7 3.8 3.9 4.2   CL 96* 98  --  102 98 101   CO2 24.0 27.0  --  23.0 22.0 27.0   BUN 15 19  --  11 13 10   CREATSERUM 0.84 1.08  --  0.70 0.76 0.79   CA 9.9 9.4  --  8.9 8.7 9.0   MG  --  1.8  --  1.9  --   --    PHOS  --  4.0  --   --   --   --    * 143*  --  152* 165* 154*       No results for input(s): \"ALT\", \"AST\", \"ALB\", \"AMYLASE\", \"LIPASE\", \"LDH\" in the last 168 hours.    Invalid input(s): \"ALPHOS\", \"TBIL\", \"DBIL\", \"TPROT\"    Recent Labs   Lab 12/26/24  1205 12/26/24  1717 12/26/24  2109 12/27/24  0806 12/27/24  1153   PGLU 164* 186* 218* 142* 211*       No results for input(s): \"TROP\" in the last 168 hours.    Imaging:  CT UROGRAM (W+WO) (CPT=74178)    Result Date: 12/26/2024  PROCEDURE: CT UROGRAM (W+WO)  (CPT=74178)  COMPARISON: None.  INDICATIONS: gross hematuria  TECHNIQUE:   CT images of the abdomen and pelvis were obtained without and with non-ionic intravenous contrast material. Automated exposure control for dose reduction was used. Adjustment of the mA and/or kV was done based on the patient's size. Use of iterative reconstruction technique for dose reduction was used.  Dose information is transmitted to the ACR (American College of Radiology) NRDR (National Radiology Data Registry) which includes the Dose Index Registry.  FINDINGS:  LOWER CHEST: Small linear consolidation in the left lung base, which likely reflects atelectasis/scarring (3:37).  There are additional minimal areas of atelectasis/scarring in the remainder of the lung bases.  There are scattered areas of peripheral reticulations.  The heart is  within normal limits of size.  There is conspicuity of the myocardium when compared to the blood pool, which can be seen in the setting of anemia.  Triple-vessel coronary artery calcifications.  No pericardial effusion.  Mild  bilateral gynecomastia.  HEPATOBILIARY:   No enlargement, atrophy, abnormal density, or significant focal lesion.  There is a punctate left hepatic lobe calcification (2:35) The gallbladder is unremarkable. No intrahepatic or extrahepatic biliary ductal dilatation.  SPLEEN:   No enlargement or focal lesion.   PANCREAS:   Scattered fatty atrophy of the pancreas.  No pancreatic mass or ductal dilatation.  ADRENALS:   No mass or enlargement.   GENITOURINARY:   No hydronephrosis.  No renal calculus.  No enhancing renal mass.  There are subcentimeter bilateral renal sinus cysts.  Mild bilateral perirenal fat stranding.  No ureteral calculus.  No ureteral lesion is identified.  There are few small areas of peristalsis involving the left mid ureter.  There is a well-positioned Sanchez catheter.  There is minimal postprocedural air within the urinary bladder.  Mild circumferential bladder wall thickening.  There is mild perivesicular fat stranding.  GI TRACT:    No bowel obstruction.  No abnormal bowel wall thickening.  The appendix is unremarkable.  No acute appendicitis.  There is colonic diverticulosis without evidence of acute diverticulitis.  PELVIC ORGANS: The prostate is enlarged and measures 5.3 cm in the transverse plane (5:140).  AORTA/VASCULAR:   No aneurysm or dissection.  There is atherosclerotic calcification of the abdominal aorta and its branching vessels.  RETROPERITONEUM:   No mass or enlarged adenopathy.   PERITONEUM: No pneumoperitoneum or ascites.  LYMPH NODES: No evidence of lymphadenopathy.   BONES:  No acute fracture. No aggressive osseous lesion.  There is dextrocurvature of the lumbar spine.  Multilevel degenerative changes of the lower thoracic and lumbar spine.   Mild-to-moderate degenerative change involving the osseous pelvis.  OTHER:   Left greater than right fat containing inguinal hernias.          CONCLUSION:   1. No hydronephrosis, urinary calculus, enhancing renal mass, or ureteral lesion. 2. Circumferential bladder wall thickening with mild perivesicular fat stranding, which may be secondary to underdistention, cystitis, and/or chronic outlet obstruction.  3. Well-positioned Sanchez catheter with expected minimal postprocedural air in the urinary bladder. 4. Prostatomegaly. 5. Lesser incidental findings described above.    No significant change when compared to the preliminary radiology report from [facility].   Dictated by (CST): Dale Clark MD on 12/26/2024 at 9:17 AM     Finalized by (CST): Dale Clark MD on 12/26/2024 at 9:25 AM

## 2024-12-27 NOTE — PROGRESS NOTES
Received discharge order. Cleared from urology and hospitalist to go home. AVS printed and given to patient. Explained the AVS and all questions addressed,

## 2024-12-27 NOTE — PLAN OF CARE
Problem: Patient Centered Care  Goal: Patient preferences are identified and integrated in the patient's plan of care  Description: Interventions:  - What would you like us to know as we care for you?   - Provide timely, complete, and accurate information to patient/family  - Incorporate patient and family knowledge, values, beliefs, and cultural backgrounds into the planning and delivery of care  - Encourage patient/family to participate in care and decision-making at the level they choose  - Honor patient and family perspectives and choices  Outcome: Progressing     Problem: Diabetes/Glucose Control  Goal: Glucose maintained within prescribed range  Description: INTERVENTIONS:  - Monitor Blood Glucose as ordered  - Assess for signs and symptoms of hyperglycemia and hypoglycemia  - Administer ordered medications to maintain glucose within target range  - Assess barriers to adequate nutritional intake and initiate nutrition consult as needed  - Instruct patient on self management of diabetes  Outcome: Progressing     Problem: Patient/Family Goals  Goal: Patient/Family Long Term Goal  Description: Patient's Long Term Goal:     Interventions:  -   - See additional Care Plan goals for specific interventions  Outcome: Progressing  Goal: Patient/Family Short Term Goal  Description: Patient's Short Term Goal:     Interventions:   -   - See additional Care Plan goals for specific interventions  Outcome: Progressing     Problem: PAIN - ADULT  Goal: Verbalizes/displays adequate comfort level or patient's stated pain goal  Description: INTERVENTIONS:  - Encourage pt to monitor pain and request assistance  - Assess pain using appropriate pain scale  - Administer analgesics based on type and severity of pain and evaluate response  - Implement non-pharmacological measures as appropriate and evaluate response  - Consider cultural and social influences on pain and pain management  - Manage/alleviate anxiety  - Utilize  distraction and/or relaxation techniques  - Monitor for opioid side effects  - Notify MD/LIP if interventions unsuccessful or patient reports new pain  - Anticipate increased pain with activity and pre-medicate as appropriate  Outcome: Progressing     Problem: RISK FOR INFECTION - ADULT  Goal: Absence of fever/infection during anticipated neutropenic period  Description: INTERVENTIONS  - Monitor WBC  - Administer growth factors as ordered  - Implement neutropenic guidelines  Outcome: Progressing     Problem: SAFETY ADULT - FALL  Goal: Free from fall injury  Description: INTERVENTIONS:  - Assess pt frequently for physical needs  - Identify cognitive and physical deficits and behaviors that affect risk of falls.  - [place] fall precautions as indicated by assessment.  - Educate pt/family on patient safety including physical limitations  - Instruct pt to call for assistance with activity based on assessment  - Modify environment to reduce risk of injury  - Provide assistive devices as appropriate  - Consider OT/PT consult to assist with strengthening/mobility  - Encourage toileting schedule  Outcome: Progressing     Problem: DISCHARGE PLANNING  Goal: Discharge to home or other facility with appropriate resources  Description: INTERVENTIONS:  - Identify barriers to discharge w/pt and caregiver  - Include patient/family/discharge partner in discharge planning  - Arrange for needed discharge resources and transportation as appropriate  - Identify discharge learning needs (meds, wound care, etc)  - Arrange for interpreters to assist at discharge as needed  - Consider post-discharge preferences of patient/family/discharge partner  - Complete POLST form as appropriate  - Assess patient's ability to be responsible for managing their own health  - Refer to Case Management Department for coordinating discharge planning if the patient needs post-hospital services based on physician/LIP order or complex needs related to  functional status, cognitive ability or social support system  Outcome: Progressing

## 2024-12-27 NOTE — PROGRESS NOTES
[facility]  [facility] Progress Note    Martin Finley Patient Status:  Inpatient    1950 MRN [id number]   Location [facility] 4W/SW/SE Attending Ava Avitia MD   Hosp Day # 2 PCP Ashlyn Fajardo MD     Martin Finley is a(n) 74 year old male admitted with:  Chief Complaint   Patient presents with    Bleeding       SUBJECTIVE:   Reason for Urology Consult: Hematuria, BPH    NAEO.  Voiding well following vasquez removal.  Urine is clear with appropriate PVR.    Past Medical History:    Amblyopia     Right Eye     Anxiety    Cataracts, bilateral    per NG    Cellulitis of left foot    Depression    Diabetes mellitus type 2 without retinopathy (HCC)    per NG    History of hepatitis A virus infection    per NG    MGD (meibomian gland dysfunction)    per     Other and unspecified hyperlipidemia    per     Type II or unspecified type diabetes mellitus without mention of complication, not stated as uncontrolled    per     Unspecified essential hypertension    per NG    Vitreous floaters of right eye    per NG       Past Surgical History:   Procedure Laterality Date    Back surgery      Colonoscopy  10/2007      Family History   Problem Relation Age of Onset    Other (Hernia complications) Father 50        Cause of death; per NG    Diabetes Sister         per NG    Glaucoma Neg     Macular degeneration Neg      Allergies[1]   Social History:  Social History     Socioeconomic History    Marital status:    Tobacco Use    Smoking status: Never    Smokeless tobacco: Never   Vaping Use    Vaping status: Never Used   Substance and Sexual Activity    Alcohol use: Not Currently     Comment: 1 beer/month    Drug use: No    Sexual activity: Not Currently     Partners: Female   Other Topics Concern    Caffeine Concern Yes     Comment: Coffee, 2 cups; per NG    Reaction to local anesthetic No     Social Drivers of Health     Financial Resource Strain: Low Risk  (2024)     Received from [facility]    Overall Financial Resource Strain (CARDIA)     Difficulty of Paying Living Expenses: Not hard at all   Food Insecurity: No Food Insecurity (12/25/2024)    Food Insecurity     Food Insecurity: Never true   Transportation Needs: No Transportation Needs (12/25/2024)    Transportation Needs     Lack of Transportation: No   Physical Activity: Insufficiently Active (9/24/2024)    Received from [facility]    Exercise Vital Sign     Days of Exercise per Week: 2 days     Minutes of Exercise per Session: 30 min   Stress: No Stress Concern Present (9/24/2024)    Received from [facility]    [place] [employer] - Occupational Stress Questionnaire     Feeling of Stress : Not at all   Social Connections: Moderately Isolated (9/24/2024)    Received from [facility]    Social Connection and Isolation Panel [NHANES]     Frequency of Communication with Friends and Family: Twice a week     Frequency of Social Gatherings with Friends and Family: Twice a week     Attends Faith Services: Never     Active Member of Clubs or Organizations: No     Attends Club or Organization Meetings: Never     Marital Status:    Housing Stability: Low Risk  (12/25/2024)    Housing Stability     Housing Instability: No        MEDICATIONS:     No current outpatient medications on file.       I/Os:   I/O last 3 completed shifts:  In: -   Out: 6450 [Urine:6450]     EXAM:     Vital 24 Hour Range   Temperature Temp  Min: 97.6 °F (36.4 °C)  Max: 98.1 °F (36.7 °C)   Pulse Pulse  Min: 72  Max: 80   Respiratory Resp  Min: 18  Max: 20   Non-Invasive  Blood Pressure BP  Min: 109/54  Max: 152/77   Pulse Oximetry SpO2  Min: 96 %  Max: 98 %     /68 (BP Location: Right arm)   Pulse 72   Temp 97.6 °F (36.4 °C) (Oral)   Resp 18   Wt 208 lb (94.3 kg)   SpO2 97%   BMI 28.21 kg/m²   Body mass index is 28.21 kg/m².  Physical Exam  Constitutional:       Appearance:  Normal appearance.   HENT:      Head: Normocephalic and atraumatic.   Cardiovascular:      Rate and Rhythm: Normal rate.      Pulses: Normal pulses.   Pulmonary:      Effort: Pulmonary effort is normal. No respiratory distress.   Abdominal:      General: Abdomen is flat.      Palpations: Abdomen is soft.   Skin:     General: Skin is warm and dry.   Neurological:      Mental Status: He is alert and oriented to person, place, and time. Mental status is at baseline.   Psychiatric:         Mood and Affect: Mood normal.         Behavior: Behavior normal.       IMAGING:     CT UROGRAM (W+WO) (CPT=74178)   Final Result   PROCEDURE: CT UROGRAM (W+WO)  (CPT=74178)       COMPARISON: None.       INDICATIONS: gross hematuria       TECHNIQUE:   CT images of the abdomen and pelvis were obtained without and    with non-ionic intravenous contrast material. Automated exposure control    for dose reduction was used. Adjustment of the mA and/or kV was done based    on the patient's size. Use of    iterative reconstruction technique for dose reduction was used.  Dose    information is transmitted to the ACR (American College of Radiology) NRDR    (National Radiology Data Registry) which includes the Dose Index Registry.       FINDINGS:    LOWER CHEST: Small linear consolidation in the left lung base, which    likely reflects atelectasis/scarring (3:37).  There are additional minimal    areas of atelectasis/scarring in the remainder of the lung bases.  There    are scattered areas of peripheral    reticulations.  The heart is within normal limits of size.  There is    conspicuity of the myocardium when compared to the blood pool, which can    be seen in the setting of anemia.  Triple-vessel coronary artery    calcifications.  No pericardial effusion.  Mild    bilateral gynecomastia.       HEPATOBILIARY:   No enlargement, atrophy, abnormal density, or significant    focal lesion.  There is a punctate left hepatic lobe calcification  (2:35)    The gallbladder is unremarkable. No intrahepatic or extrahepatic biliary    ductal dilatation.       SPLEEN:   No enlargement or focal lesion.         PANCREAS:   Scattered fatty atrophy of the pancreas.  No pancreatic mass    or ductal dilatation.       ADRENALS:   No mass or enlargement.         GENITOURINARY:   No hydronephrosis.  No renal calculus.  No enhancing    renal mass.  There are subcentimeter bilateral renal sinus cysts.  Mild    bilateral perirenal fat stranding.  No ureteral calculus.  No ureteral    lesion is identified.  There are few    small areas of peristalsis involving the left mid ureter.  There is a    well-positioned Sanchez catheter.  There is minimal postprocedural air    within the urinary bladder.  Mild circumferential bladder wall thickening.     There is mild perivesicular fat    stranding.       GI TRACT:    No bowel obstruction.  No abnormal bowel wall thickening.     The appendix is unremarkable.  No acute appendicitis.  There is colonic    diverticulosis without evidence of acute diverticulitis.       PELVIC ORGANS: The prostate is enlarged and measures 5.3 cm in the    transverse plane (5:140).       AORTA/VASCULAR:   No aneurysm or dissection.  There is atherosclerotic    calcification of the abdominal aorta and its branching vessels.       RETROPERITONEUM:   No mass or enlarged adenopathy.         PERITONEUM: No pneumoperitoneum or ascites.       LYMPH NODES: No evidence of lymphadenopathy.         BONES:  No acute fracture. No aggressive osseous lesion.  There is    dextrocurvature of the lumbar spine.  Multilevel degenerative changes of    the lower thoracic and lumbar spine.  Mild-to-moderate degenerative change    involving the osseous pelvis.       OTHER:   Left greater than right fat containing inguinal hernias.                       =====   CONCLUSION:        1. No hydronephrosis, urinary calculus, enhancing renal mass, or ureteral    lesion.   2. Circumferential  bladder wall thickening with mild perivesicular fat    stranding, which may be secondary to underdistention, cystitis, and/or    chronic outlet obstruction.     3. Well-positioned Sanchez catheter with expected minimal postprocedural air    in the urinary bladder.   4. Prostatomegaly.   5. Lesser incidental findings described above.             No significant change when compared to the preliminary radiology report    from [facility].           Dictated by (CST): Dale Clark MD on 12/26/2024 at 9:17 AM        Finalized by (CST): Dale Clark MD on 12/26/2024 at 9:25 AM                   LABS:     Recent Results (from the past 24 hours)   POCT Glucose    Collection Time: 12/26/24  9:09 PM   Result Value Ref Range    POC Glucose  218 (H) 70 - 99 mg/dL   POCT Glucose    Collection Time: 12/27/24  8:06 AM   Result Value Ref Range    POC Glucose  142 (H) 70 - 99 mg/dL   POCT Glucose    Collection Time: 12/27/24 11:53 AM   Result Value Ref Range    POC Glucose  211 (H) 70 - 99 mg/dL   POCT Glucose    Collection Time: 12/27/24  4:57 PM   Result Value Ref Range    POC Glucose  166 (H) 70 - 99 mg/dL        CBC  Recent Labs   Lab 12/24/24 0523 12/25/24 0439 12/26/24 0416   RBC 3.71* 3.52* 3.51*   HGB 11.9* 11.5* 11.3*   HCT 34.3* 32.3* 32.6*   MCV 92.5 91.8 92.9   MCH 32.1 32.7 32.2   MCHC 34.7 35.6 34.7   RDW 11.9 11.6 11.6   NEPRELIM 16.57* 11.87* 6.60   WBC 19.2* 14.9* 9.9   .0 191.0 211.0            BMP  Recent Labs   Lab 12/24/24 0523 12/25/24 0439 12/26/24  0416   * 165* 154*   BUN 11 13 10   CREATSERUM 0.70 0.76 0.79   EGFRCR 96 94 93   CA 8.9 8.7 9.0   * 128* 132*   K 3.8 3.9 4.2    98 101   CO2 23.0 22.0 27.0        Urinalysis/Cultures  Recent Labs   Lab 12/22/24  1214   CLARITY Cloudy*   SPECGRAVITY 1.025   GLUUR 100*   BILUR Negative   KETUR Negative   BLOODURINE Large*   PHURINE 7.0   PROUR >=300*   UROBILINOGEN 0.2   NITRITE Negative   LEUUR Small*   WBCUR >50*  >50*    RBCUR >10*  >10*   BACUR None Seen  None Seen   EPIUR None Seen  None Seen        Susceptibility data from last 90 days.  Collected Specimen Info Organism Ampicillin Ampicillin/Sulbactam Cefazolin Ciprofloxacin Gentamicin Levofloxacin Meropenem Nitrofurantoin Piperacillin/Tazobactam Trimethoprim/Sulfamethoxazole   12/22/24 Urine, clean catch Escherichia coli  R  I  S  S  S  S  S  S  S  S        ASSESSMENT AND PLAN:   Imaging and Labs independently interpreted which are significant for an enlarged prostate seen on CT Urogram and a culture from 12/22/24 growing E. coli.    Impression:  Patient Active Problem List   Diagnosis    Type 2 diabetes mellitus with hyperglycemia, without long-term current use of insulin (HCC)    Essential hypertension    Hyperlipidemia    Vitamin D deficiency    Atypical nevi    Medicare annual wellness visit, subsequent    Glaucoma suspect of both eyes    Diabetes mellitus type 2 without retinopathy ([facility])    Age-related nuclear cataract of both eyes    Gout    Bruit of right carotid artery    Mild depression    Vitreous floaters of both eyes    Elevated coronary artery calcium score    Amblyopia, right    Hematuria, unspecified type    Hyponatremia       Martin Finley is a(n) 74 year old male with:    BPH with LUTS  Gross hematuria  Complex UTI    - Continue Flomax at bedtime  - OK to resume blood thinners as urine has cleared  - Continue antibiotics for a total of 14 days  - Has appointment with me on 1/6/25, will plan for cystoscopy at that time  - OK for discharge from  standpoint    DO [facility]  O:  [phone]  12/27/2024  5:48 PM         [1] No Known Allergies

## 2024-12-27 NOTE — PLAN OF CARE
Problem: Patient Centered Care  Goal: Patient preferences are identified and integrated in the patient's plan of care  Description: Interventions:  Problem: PAIN - ADULT  Goal: Verbalizes/displays adequate comfort level or patient's stated pain goal  Description: INTERVENTIONS:  - Encourage pt to monitor pain and request assistance  - Assess pain using appropriate pain scale  - Administer analgesics based on type and severity of pain and evaluate response  - Implement non-pharmacological measures as appropriate and evaluate response  - Consider cultural and social influences on pain and pain management  - Manage/alleviate anxiety  - Utilize distraction and/or relaxation techniques  - Monitor for opioid side effects  - Notify MD/LIP if interventions unsuccessful or patient reports new pain  - Anticipate increased pain with activity and pre-medicate as appropriate  Outcome: Adequate for Discharge     Problem: RISK FOR INFECTION - ADULT  Goal: Absence of fever/infection during anticipated neutropenic period  Description: INTERVENTIONS  - Monitor WBC  - Administer growth factors as ordered  - Implement neutropenic guidelines  Outcome: Adequate for Discharge     - Provide timely, complete, and accurate information to patient/family  - Incorporate patient and family knowledge, values, beliefs, and cultural backgrounds into the planning and delivery of care  - Encourage patient/family to participate in care and decision-making at the level they choose  - Honor patient and family perspectives and choices  Outcome: Adequate for Discharge

## 2024-12-28 NOTE — DISCHARGE SUMMARY
[facility]  part of [facility] Internal Medicine Discharge Summary   Patient ID:  Martin Finley  [id number]  74 year old  9/12/1950    Admit date: 12/25/2024    Discharge date and time: 12/27/2024  6:18 PM      Attending Physician: No att. providers found     Primary Care Physician: Ashlyn Fajardo MD     Reason for admission hematuria recurrent (see HPI on HP for further detail)    Discharged Condition: stable    Disposition: home    Risk of Readmission Lace+ Score: 61  59-90 High Risk  29-58 Medium Risk  0-28   Low Risk    Important follow up:  Has urology appointment on 1/6/2024.  [facility] coordinator notified for PCP appointment  Discharge meds  I reviewed and reconciled current and discharge medications on the day of discharge with the changes reflected below.       Medication List        CONTINUE taking these medications      allopurinol 100 MG Tabs  Commonly known as: Zyloprim  Take 1 tablet by mouth once daily     Marlys Contour Test Strp  USE ONE STRIP TO CHECK GLUCOSE TWICE DAILY     cephALEXin 500 MG Caps  Commonly known as: Keflex  Take 1 capsule (500 mg total) by mouth 3 (three) times daily for 5 days.     Cholecalciferol 50 MCG (2000 UT) Tabs     furosemide 20 MG Tabs  Commonly known as: Lasix     gabapentin 100 MG Caps  Commonly known as: Neurontin     glimepiride 1 MG Tabs  Commonly known as: Amaryl  Half a tablet daily with breakfast     GLUCOSAMINE CHONDR COMPLEX OR     metFORMIN  MG Tb24  Commonly known as: Glucophage XR  Take 1 tablet (750 mg total) by mouth 2 (two) times daily with meals.     omega-3 fatty acids 1000 MG Caps  Commonly known as: Fish Oil     Polyethylene Glycol 3350 17 g Pack  Commonly known as: MIRALAX  Take 17 g by mouth daily as needed (If no bowel movement in last 24 hours).     rosuvastatin 20 MG Tabs  Commonly known as: Crestor  Take 1 tablet (20 mg total) by mouth nightly.     tamsulosin 0.4 MG Caps  Commonly known as: Flomax            HPI  per chart  Patient is a 74 year old M known from yesterday admitted 12/22 to 12/24 returns with hematuria. Hx CAD w stents (2022, Dec 2023) on plavix held at discharge 12/24, afib noted last admit not on AC, also BPH, DM, MDD, HL presenting w gross hematuria 12/22, noted with fever and given abx  for UTI, sp CBI with resolution of hematuria- tolerated voiding trial 12/24 and discharged to home off plavix and not on AC for new onset afib w RVR noted on admit (plan to start when ok w urology seen by his cardiology team during the admission). Spouse who is present at bedside reports he came home and in the middle of the night had a an episode of large volume hematuria prompting them to come back in. + pain w voiding no current abd/ suprapubic pain. On CBI w clear urine in the bag.   Hospital Course  Patient's antibiotics from prior admission were continued.  Patient was seen by urology.  Cleared with CBI.  Patient wanted Vasquez removed despite urology's recommendation to continue Vasquez for at least a few days.  Was removed on 12/26/2024.  Intermittently would have small clots followed by clear urine, now appears to have cleared completely.  Seen by urology prior to discharge.  Discharge Diagnoses:   Gross hematuria with ABLA  - recurrent admit 12/22 to 12/24 w same sp CBI- resolved, voiding trial dc  w/o vasquez and on abx/ off blood thinners  - readmit 12/25 w hematuria hg 11.5 fm 11.9->11.3  - urology  cs:   - cont abx for Ecoli bacturia/ UTI from  last admit  -ct urologram done, cleared for dc per urology   - hold all blood thinners, hematology recommendations resume blood thinners was not relayed to service prior to patient discharge.  Will follow-up with patient tomorrow and ensure that no further hematuria and then resume  -Patient will need to follow-up with cardiology to consider further anticoagulation in the future for A-fib noted last admission  -Patient to complete antibiotics as outpatient.  Leukocytosis  -  downtrended from last admit  - cont keflex - Ecoli S to keflex ucx  12/22.  Will call in 4 more days of oral antibiotic in a.m. and notify wife.     CAD sp stents  - sp JIMMY to OM and RCA Dec  2022 and most recently JIMMY to LAD Dec 19 2023 w Uli Harris from [facility]  - holding plavix w recurrent hematuria     Afbi  - noted last admit  - not yet started on AC for stroke ppx see above        DM  - SSI PRN in house     BPH  - flomax, gu on cs       Consults: IP CONSULT TO UROLOGY    Radiology: CT UROGRAM (W+WO) (CPT=74178)    Result Date: 12/26/2024  PROCEDURE: CT UROGRAM (W+WO)  (CPT=74178)  COMPARISON: None.  INDICATIONS: gross hematuria  TECHNIQUE:   CT images of the abdomen and pelvis were obtained without and with non-ionic intravenous contrast material. Automated exposure control for dose reduction was used. Adjustment of the mA and/or kV was done based on the patient's size. Use of iterative reconstruction technique for dose reduction was used.  Dose information is transmitted to the ACR (American College of Radiology) NRDR (National Radiology Data Registry) which includes the Dose Index Registry.  FINDINGS:  LOWER CHEST: Small linear consolidation in the left lung base, which likely reflects atelectasis/scarring (3:37).  There are additional minimal areas of atelectasis/scarring in the remainder of the lung bases.  There are scattered areas of peripheral reticulations.  The heart is within normal limits of size.  There is conspicuity of the myocardium when compared to the blood pool, which can be seen in the setting of anemia.  Triple-vessel coronary artery calcifications.  No pericardial effusion.  Mild  bilateral gynecomastia.  HEPATOBILIARY:   No enlargement, atrophy, abnormal density, or significant focal lesion.  There is a punctate left hepatic lobe calcification (2:35) The gallbladder is unremarkable. No intrahepatic or extrahepatic biliary ductal dilatation.  SPLEEN:   No enlargement or focal lesion.    PANCREAS:   Scattered fatty atrophy of the pancreas.  No pancreatic mass or ductal dilatation.  ADRENALS:   No mass or enlargement.   GENITOURINARY:   No hydronephrosis.  No renal calculus.  No enhancing renal mass.  There are subcentimeter bilateral renal sinus cysts.  Mild bilateral perirenal fat stranding.  No ureteral calculus.  No ureteral lesion is identified.  There are few small areas of peristalsis involving the left mid ureter.  There is a well-positioned Sanchez catheter.  There is minimal postprocedural air within the urinary bladder.  Mild circumferential bladder wall thickening.  There is mild perivesicular fat stranding.  GI TRACT:    No bowel obstruction.  No abnormal bowel wall thickening.  The appendix is unremarkable.  No acute appendicitis.  There is colonic diverticulosis without evidence of acute diverticulitis.  PELVIC ORGANS: The prostate is enlarged and measures 5.3 cm in the transverse plane (5:140).  AORTA/VASCULAR:   No aneurysm or dissection.  There is atherosclerotic calcification of the abdominal aorta and its branching vessels.  RETROPERITONEUM:   No mass or enlarged adenopathy.   PERITONEUM: No pneumoperitoneum or ascites.  LYMPH NODES: No evidence of lymphadenopathy.   BONES:  No acute fracture. No aggressive osseous lesion.  There is dextrocurvature of the lumbar spine.  Multilevel degenerative changes of the lower thoracic and lumbar spine.  Mild-to-moderate degenerative change involving the osseous pelvis.  OTHER:   Left greater than right fat containing inguinal hernias.          CONCLUSION:   1. No hydronephrosis, urinary calculus, enhancing renal mass, or ureteral lesion. 2. Circumferential bladder wall thickening with mild perivesicular fat stranding, which may be secondary to underdistention, cystitis, and/or chronic outlet obstruction.  3. Well-positioned Sanchez catheter with expected minimal postprocedural air in the urinary bladder. 4. Prostatomegaly. 5. Lesser incidental  findings described above.    No significant change when compared to the preliminary radiology report from [facility].   Dictated by (CST): Dale Clark MD on 12/26/2024 at 9:17 AM     Finalized by (CST): Dale Clark MD on 12/26/2024 at 9:25 AM             Operative Procedures:      Day of discharge see same day PGN. Urine did later clear, saw urology prior to dc    Total Time Coordinating Care: > than 30 minutes  Note: This chart was prepared using voice recognition software and may contain unintended word substitution errors.     Discussed with wife over the phone  Patient had opportunity to ask questions and state understand and agree with therapeutic plan as outlined

## 2025-06-26 NOTE — MR AVS SNAPSHOT
[place]  [address]  [phone]               Thank you for choosing us for your health care visit with Bea Talbert MD.  We are glad to serve you and happy to provide you with this summa - how to take this  - when to take this  - additional instructions   Commonly known as:  ZOCOR           * Notice: This list has 2 medication(s) that are the same as other medications prescribed for you.  Read the directions carefully, and ask your doctor Medical Necessity Information: It is in your best interest to select a reason for this procedure from the list below. All of these items fulfill various CMS LCD requirements except lesion extends to a margin. Include Z78.9 (Other Specified Conditions Influencing Health Status) As An Associated Diagnosis?: No Medical Necessity Clause: This procedure was medically necessary because the lesion that was treated was: Lab: -[id number] Lab Facility: [id number] Surgeon (Optional): Luis Maxwell Biopsy Photograph Reviewed: Yes Size Of Lesion In Cm: 0.5 Anesthesia Volume In Cc: 13 Eye Clamp Note Details: An eye clamp was used during the procedure. Excision Method: Elliptical Saucerization Depth: dermis and superficial adipose tissue Repair Type: Complex Intermediate / Complex Repair - Final Wound Length In Cm: 5 Deep Sutures: 4-0 Vicryl Epidermal Sutures: 4-0 Monocryl Suturegard Retention Suture: 2-0 Nylon Retention Suture Bite Size: 3 mm Length To Time In Minutes Device Was In Place: 10 Number Of Hemigard Strips Per Side: 1 Width Of Defect Perpendicular To Closure In Cm (Required): 1.7 Undermining Type: Entire Wound Debridement Text: The wound edges were debrided prior to proceeding with the closure to facilitate wound healing. Helical Rim Text: The closure involved the helical rim. Vermilion Border Text: The closure involved the vermilion border. Nostril Rim Text: The closure involved the nostril rim. Retention Suture Text: Retention sutures were placed to support the closure and prevent dehiscence. Epidermal Closure Graft Donor Site (Optional): simple interrupted Graft Donor Site Bandage (Optional-Leave Blank If You Don't Want In Note): Steri-strips and a pressure bandage were applied to the donor site. Detail Level: Detailed Excision Depth: adipose tissue Scalpel Size: 15 blade Anesthesia Type: 1% lidocaine with epinephrine and a 1:10 solution of 8.4% sodium bicarbonate Hemostasis: Electrocautery Estimated Blood Loss (Cc): minimal Repair Depth: use same depth as excision depth Epidermal Closure: running subcuticular Wound Care: Mastisol Dressing: steri-strips and pressure dressing Suturegard Intro: Intraoperative tissue expansion was performed, utilizing the SUTUREGARD device, in order to reduce wound tension. Suturegard Body: The suture ends were repeatedly re-tightened and re-clamped to achieve the desired tissue expansion. Hemigard Intro: Due to skin fragility and wound tension, it was decided to use HEMIGARD adhesive retention suture devices to permit a linear closure. The skin was cleaned and dried for a 6cm distance away from the wound. Excessive hair, if present, was removed to allow for adhesion. Hemigard Postcare Instructions: The HEMIGARD strips are to remain completely dry for at least 5-7 days. Positioning (Leave Blank If You Do Not Want): The patient was placed in a comfortable position exposing the surgical site. Complex Repair Preamble Text (Leave Blank If You Do Not Want): Extensive wide undermining was performed. Intermediate Repair Preamble Text (Leave Blank If You Do Not Want): Undermining was performed with blunt dissection. Fusiform Excision Additional Text (Leave Blank If You Do Not Want): The margin was drawn around the clinically apparent lesion.  A fusiform shape was then drawn on the skin incorporating the lesion and margins.  Incisions were then made along these lines to the appropriate tissue plane and the lesion was extirpated. Eliptical Excision Additional Text (Leave Blank If You Do Not Want): The margin was drawn around the clinically apparent lesion.  An elliptical shape was then drawn on the skin incorporating the lesion and margins.  Incisions were then made along these lines to the appropriate tissue plane and the lesion was extirpated. Saucerization Excision Additional Text (Leave Blank If You Do Not Want): The margin was drawn around the clinically apparent lesion.  Incisions were then made along these lines, in a tangential fashion, to the appropriate tissue plane and the lesion was extirpated. Slit Excision Additional Text (Leave Blank If You Do Not Want): A linear line was drawn on the skin overlying the lesion. An incision was made slowly until the lesion was visualized.  Once visualized, the lesion was removed with blunt dissection. Excisional Biopsy Additional Text (Leave Blank If You Do Not Want): The margin was drawn around the clinically apparent lesion. An elliptical shape was then drawn on the skin incorporating the lesion and margins.  Incisions were then made along these lines to the appropriate tissue plane and the lesion was extirpated. Perilesional Excision Additional Text (Leave Blank If You Do Not Want): The margin was drawn around the clinically apparent lesion. Incisions were then made along these lines to the appropriate tissue plane and the lesion was extirpated. Repair Performed By Another Provider Text (Leave Blank If You Do Not Want): After the tissue was excised the defect was repaired by another provider. No Repair - Repaired With Adjacent Surgical Defect Text (Leave Blank If You Do Not Want): After the excision the defect was repaired concurrently with another surgical defect which was in close approximation. Adjacent Tissue Transfer Text: The defect edges were debeveled with a #15 scalpel blade.  Given the location of the defect and the proximity to free margins an adjacent tissue transfer was deemed most appropriate.  Using a sterile surgical marker, an appropriate flap was drawn incorporating the defect and placing the expected incisions within the relaxed skin tension lines where possible.    The area thus outlined was incised deep to adipose tissue with a #15 scalpel blade.  The skin margins were undermined to an appropriate distance in all directions utilizing iris scissors. Advancement Flap (Single) Text: The defect edges were debeveled with a #15 scalpel blade.  Given the location of the defect and the proximity to free margins a single advancement flap was deemed most appropriate.  Using a sterile surgical marker, an appropriate advancement flap was drawn incorporating the defect and placing the expected incisions within the relaxed skin tension lines where possible.    The area thus outlined was incised deep to adipose tissue with a #15 scalpel blade.  The skin margins were undermined to an appropriate distance in all directions utilizing iris scissors. Advancement Flap (Double) Text: The defect edges were debeveled with a #15 scalpel blade.  Given the location of the defect and the proximity to free margins a double advancement flap was deemed most appropriate.  Using a sterile surgical marker, the appropriate advancement flaps were drawn incorporating the defect and placing the expected incisions within the relaxed skin tension lines where possible.    The area thus outlined was incised deep to adipose tissue with a #15 scalpel blade.  The skin margins were undermined to an appropriate distance in all directions utilizing iris scissors. Burow's Advancement Flap Text: The defect edges were debeveled with a #15 scalpel blade.  Given the location of the defect and the proximity to free margins a Burow's advancement flap was deemed most appropriate.  Using a sterile surgical marker, the appropriate advancement flap was drawn incorporating the defect and placing the expected incisions within the relaxed skin tension lines where possible.    The area thus outlined was incised deep to adipose tissue with a #15 scalpel blade.  The skin margins were undermined to an appropriate distance in all directions utilizing iris scissors. Chonodrocutaneous Helical Advancement Flap Text: The defect edges were debeveled with a #15 scalpel blade.  Given the location of the defect and the proximity to free margins a chondrocutaneous helical advancement flap was deemed most appropriate.  Using a sterile surgical marker, the appropriate advancement flap was drawn incorporating the defect and placing the expected incisions within the relaxed skin tension lines where possible.    The area thus outlined was incised deep to adipose tissue with a #15 scalpel blade.  The skin margins were undermined to an appropriate distance in all directions utilizing iris scissors. Crescentic Advancement Flap Text: The defect edges were debeveled with a #15 scalpel blade.  Given the location of the defect and the proximity to free margins a crescentic advancement flap was deemed most appropriate.  Using a sterile surgical marker, the appropriate advancement flap was drawn incorporating the defect and placing the expected incisions within the relaxed skin tension lines where possible.    The area thus outlined was incised deep to adipose tissue with a #15 scalpel blade.  The skin margins were undermined to an appropriate distance in all directions utilizing iris scissors. A-T Advancement Flap Text: The defect edges were debeveled with a #15 scalpel blade.  Given the location of the defect, shape of the defect and the proximity to free margins an A-T advancement flap was deemed most appropriate.  Using a sterile surgical marker, an appropriate advancement flap was drawn incorporating the defect and placing the expected incisions within the relaxed skin tension lines where possible.    The area thus outlined was incised deep to adipose tissue with a #15 scalpel blade.  The skin margins were undermined to an appropriate distance in all directions utilizing iris scissors. O-T Advancement Flap Text: The defect edges were debeveled with a #15 scalpel blade.  Given the location of the defect, shape of the defect and the proximity to free margins an O-T advancement flap was deemed most appropriate.  Using a sterile surgical marker, an appropriate advancement flap was drawn incorporating the defect and placing the expected incisions within the relaxed skin tension lines where possible.    The area thus outlined was incised deep to adipose tissue with a #15 scalpel blade.  The skin margins were undermined to an appropriate distance in all directions utilizing iris scissors. O-L Flap Text: The defect edges were debeveled with a #15 scalpel blade.  Given the location of the defect, shape of the defect and the proximity to free margins an O-L flap was deemed most appropriate.  Using a sterile surgical marker, an appropriate advancement flap was drawn incorporating the defect and placing the expected incisions within the relaxed skin tension lines where possible.    The area thus outlined was incised deep to adipose tissue with a #15 scalpel blade.  The skin margins were undermined to an appropriate distance in all directions utilizing iris scissors. O-Z Flap Text: The defect edges were debeveled with a #15 scalpel blade.  Given the location of the defect, shape of the defect and the proximity to free margins an O-Z flap was deemed most appropriate.  Using a sterile surgical marker, an appropriate transposition flap was drawn incorporating the defect and placing the expected incisions within the relaxed skin tension lines where possible. The area thus outlined was incised deep to adipose tissue with a #15 scalpel blade.  The skin margins were undermined to an appropriate distance in all directions utilizing iris scissors. Double O-Z Flap Text: The defect edges were debeveled with a #15 scalpel blade.  Given the location of the defect, shape of the defect and the proximity to free margins a Double O-Z flap was deemed most appropriate.  Using a sterile surgical marker, an appropriate transposition flap was drawn incorporating the defect and placing the expected incisions within the relaxed skin tension lines where possible. The area thus outlined was incised deep to adipose tissue with a #15 scalpel blade.  The skin margins were undermined to an appropriate distance in all directions utilizing iris scissors. V-Y Flap Text: The defect edges were debeveled with a #15 scalpel blade.  Given the location of the defect, shape of the defect and the proximity to free margins a V-Y flap was deemed most appropriate.  Using a sterile surgical marker, an appropriate advancement flap was drawn incorporating the defect and placing the expected incisions within the relaxed skin tension lines where possible.    The area thus outlined was incised deep to adipose tissue with a #15 scalpel blade.  The skin margins were undermined to an appropriate distance in all directions utilizing iris scissors. Advancement-Rotation Flap Text: The defect edges were debeveled with a #15 scalpel blade.  Given the location of the defect, shape of the defect and the proximity to free margins an advancement-rotation flap was deemed most appropriate.  Using a sterile surgical marker, an appropriate flap was drawn incorporating the defect and placing the expected incisions within the relaxed skin tension lines where possible. The area thus outlined was incised deep to adipose tissue with a #15 scalpel blade.  The skin margins were undermined to an appropriate distance in all directions utilizing iris scissors. Mercedes Flap Text: The defect edges were debeveled with a #15 scalpel blade.  Given the location of the defect, shape of the defect and the proximity to free margins a Mercedes flap was deemed most appropriate.  Using a sterile surgical marker, an appropriate advancement flap was drawn incorporating the defect and placing the expected incisions within the relaxed skin tension lines where possible. The area thus outlined was incised deep to adipose tissue with a #15 scalpel blade.  The skin margins were undermined to an appropriate distance in all directions utilizing iris scissors. Modified Advancement Flap Text: The defect edges were debeveled with a #15 scalpel blade.  Given the location of the defect, shape of the defect and the proximity to free margins a modified advancement flap was deemed most appropriate.  Using a sterile surgical marker, an appropriate advancement flap was drawn incorporating the defect and placing the expected incisions within the relaxed skin tension lines where possible.    The area thus outlined was incised deep to adipose tissue with a #15 scalpel blade.  The skin margins were undermined to an appropriate distance in all directions utilizing iris scissors. Mucosal Advancement Flap Text: Given the location of the defect, shape of the defect and the proximity to free margins a mucosal advancement flap was deemed most appropriate. Incisions were made with a 15 blade scalpel in the appropriate fashion along the cutaneous vermilion border and the mucosal lip. The remaining actinically damaged mucosal tissue was excised.  The mucosal advancement flap was then elevated to the gingival sulcus with care taken to preserve the neurovascular structures and advanced into the primary defect. Care was taken to ensure that precise realignment of the vermilion border was achieved. Peng Advancement Flap Text: The defect edges were debeveled with a #15 scalpel blade.  Given the location of the defect, shape of the defect and the proximity to free margins a Peng advancement flap was deemed most appropriate.  Using a sterile surgical marker, an appropriate advancement flap was drawn incorporating the defect and placing the expected incisions within the relaxed skin tension lines where possible. The area thus outlined was incised deep to adipose tissue with a #15 scalpel blade.  The skin margins were undermined to an appropriate distance in all directions utilizing iris scissors. Hatchet Flap Text: The defect edges were debeveled with a #15 scalpel blade.  Given the location of the defect, shape of the defect and the proximity to free margins a hatchet flap was deemed most appropriate.  Using a sterile surgical marker, an appropriate hatchet flap was drawn incorporating the defect and placing the expected incisions within the relaxed skin tension lines where possible.    The area thus outlined was incised deep to adipose tissue with a #15 scalpel blade.  The skin margins were undermined to an appropriate distance in all directions utilizing iris scissors. Rotation Flap Text: The defect edges were debeveled with a #15 scalpel blade.  Given the location of the defect, shape of the defect and the proximity to free margins a rotation flap was deemed most appropriate.  Using a sterile surgical marker, an appropriate rotation flap was drawn incorporating the defect and placing the expected incisions within the relaxed skin tension lines where possible.    The area thus outlined was incised deep to adipose tissue with a #15 scalpel blade.  The skin margins were undermined to an appropriate distance in all directions utilizing iris scissors. Bilateral Rotation Flap Text: The defect edges were debeveled with a #15 scalpel blade. Given the location of the defect, shape of the defect and the proximity to free margins a bilateral rotation flap was deemed most appropriate. Using a sterile surgical marker, an appropriate rotation flap was drawn incorporating the defect and placing the expected incisions within the relaxed skin tension lines where possible. The area thus outlined was incised deep to adipose tissue with a #15 scalpel blade. The skin margins were undermined to an appropriate distance in all directions utilizing iris scissors. Following this, the designed flap was carried over into the primary defect and sutured into place. Spiral Flap Text: The defect edges were debeveled with a #15 scalpel blade.  Given the location of the defect, shape of the defect and the proximity to free margins a spiral flap was deemed most appropriate.  Using a sterile surgical marker, an appropriate rotation flap was drawn incorporating the defect and placing the expected incisions within the relaxed skin tension lines where possible. The area thus outlined was incised deep to adipose tissue with a #15 scalpel blade.  The skin margins were undermined to an appropriate distance in all directions utilizing iris scissors. Staged Advancement Flap Text: The defect edges were debeveled with a #15 scalpel blade.  Given the location of the defect, shape of the defect and the proximity to free margins a staged advancement flap was deemed most appropriate.  Using a sterile surgical marker, an appropriate advancement flap was drawn incorporating the defect and placing the expected incisions within the relaxed skin tension lines where possible. The area thus outlined was incised deep to adipose tissue with a #15 scalpel blade.  The skin margins were undermined to an appropriate distance in all directions utilizing iris scissors. Star Wedge Flap Text: The defect edges were debeveled with a #15 scalpel blade.  Given the location of the defect, shape of the defect and the proximity to free margins a star wedge flap was deemed most appropriate.  Using a sterile surgical marker, an appropriate rotation flap was drawn incorporating the defect and placing the expected incisions within the relaxed skin tension lines where possible. The area thus outlined was incised deep to adipose tissue with a #15 scalpel blade.  The skin margins were undermined to an appropriate distance in all directions utilizing iris scissors. Transposition Flap Text: The defect edges were debeveled with a #15 scalpel blade.  Given the location of the defect and the proximity to free margins a transposition flap was deemed most appropriate.  Using a sterile surgical marker, an appropriate transposition flap was drawn incorporating the defect.    The area thus outlined was incised deep to adipose tissue with a #15 scalpel blade.  The skin margins were undermined to an appropriate distance in all directions utilizing iris scissors. Muscle Hinge Flap Text: The defect edges were debeveled with a #15 scalpel blade.  Given the size, depth and location of the defect and the proximity to free margins a muscle hinge flap was deemed most appropriate.  Using a sterile surgical marker, an appropriate hinge flap was drawn incorporating the defect. The area thus outlined was incised with a #15 scalpel blade.  The skin margins were undermined to an appropriate distance in all directions utilizing iris scissors. Mustarde Flap Text: The defect edges were debeveled with a #15 scalpel blade.  Given the size, depth and location of the defect and the proximity to free margins a Mustarde flap was deemed most appropriate.  Using a sterile surgical marker, an appropriate flap was drawn incorporating the defect. The area thus outlined was incised with a #15 scalpel blade.  The skin margins were undermined to an appropriate distance in all directions utilizing iris scissors. Nasal Turnover Hinge Flap Text: The defect edges were debeveled with a #15 scalpel blade.  Given the size, depth, location of the defect and the defect being full thickness a nasal turnover hinge flap was deemed most appropriate.  Using a sterile surgical marker, an appropriate hinge flap was drawn incorporating the defect. The area thus outlined was incised with a #15 scalpel blade. The flap was designed to recreate the nasal mucosal lining and the alar rim. The skin margins were undermined to an appropriate distance in all directions utilizing iris scissors. Nasalis-Muscle-Based Myocutaneous Island Pedicle Flap Text: Using a #15 blade, an incision was made around the donor flap to the level of the nasalis muscle. Wide lateral undermining was then performed in both the subcutaneous plane above the nasalis muscle, and in a submuscular plane just above periosteum. This allowed the formation of a free nasalis muscle axial pedicle (based on the angular artery) which was still attached to the actual cutaneous flap, increasing its mobility and vascular viability. Hemostasis was obtained with pinpoint electrocoagulation. The flap was mobilized into position and the pivotal anchor points positioned and stabilized with buried interrupted sutures. Subcutaneous and dermal tissues were closed in a multilayered fashion with sutures. Tissue redundancies were excised, and the epidermal edges were apposed without significant tension and sutured with sutures. Nasalis Myocutaneous Flap Text: Using a #15 blade, an incision was made around the donor flap to the level of the nasalis muscle. Wide lateral undermining was then performed in both the subcutaneous plane above the nasalis muscle, and in a submuscular plane just above periosteum. This allowed the formation of a free nasalis muscle axial pedicle which was still attached to the actual cutaneous flap, increasing its mobility and vascular viability. Hemostasis was obtained with pinpoint electrocoagulation. The flap was mobilized into position and the pivotal anchor points positioned and stabilized with buried interrupted sutures. Subcutaneous and dermal tissues were closed in a multilayered fashion with sutures. Tissue redundancies were excised, and the epidermal edges were apposed without significant tension and sutured with sutures. Nasolabial Transposition Flap Text: The defect edges were debeveled with a #15 scalpel blade.  Given the size, depth and location of the defect and the proximity to free margins a nasolabial transposition flap was deemed most appropriate. Using a sterile surgical marker, an appropriate flap was drawn incorporating the defect. The area thus outlined was incised with a #15 scalpel blade. The skin margins were undermined to an appropriate distance in all directions utilizing iris scissors. Following this, the designed flap was carried into the primary defect and sutured into place. Orbicularis Oris Muscle Flap Text: The defect edges were debeveled with a #15 scalpel blade.  Given that the defect affected the competency of the oral sphincter an orbicularis oris muscle flap was deemed most appropriate to restore this competency and normal muscle function.  Using a sterile surgical marker, an appropriate flap was drawn incorporating the defect. The area thus outlined was incised with a #15 scalpel blade. Melolabial Transposition Flap Text: The defect edges were debeveled with a #15 scalpel blade.  Given the location of the defect and the proximity to free margins a melolabial flap was deemed most appropriate.  Using a sterile surgical marker, an appropriate melolabial transposition flap was drawn incorporating the defect.    The area thus outlined was incised deep to adipose tissue with a #15 scalpel blade.  The skin margins were undermined to an appropriate distance in all directions utilizing iris scissors. Rectangular Flap Text: The defect edges were debeveled with a #15 scalpel blade. Given the location of the defect and the proximity to free margins a rectangular flap was deemed most appropriate. Using a sterile surgical marker, an appropriate rectangular flap was drawn incorporating the defect. The area thus outlined was incised deep to adipose tissue with a #15 scalpel blade. The skin margins were undermined to an appropriate distance in all directions utilizing iris scissors. Following this, the designed flap was carried over into the primary defect and sutured into place. Rhombic Flap Text: The defect edges were debeveled with a #15 scalpel blade.  Given the location of the defect and the proximity to free margins a rhombic flap was deemed most appropriate.  Using a sterile surgical marker, an appropriate rhombic flap was drawn incorporating the defect.    The area thus outlined was incised deep to adipose tissue with a #15 scalpel blade.  The skin margins were undermined to an appropriate distance in all directions utilizing iris scissors. Rhomboid Transposition Flap Text: The defect edges were debeveled with a #15 scalpel blade.  Given the location of the defect and the proximity to free margins a rhomboid transposition flap was deemed most appropriate.  Using a sterile surgical marker, an appropriate rhomboid flap was drawn incorporating the defect.    The area thus outlined was incised deep to adipose tissue with a #15 scalpel blade.  The skin margins were undermined to an appropriate distance in all directions utilizing iris scissors. Bi-Rhombic Flap Text: The defect edges were debeveled with a #15 scalpel blade.  Given the location of the defect and the proximity to free margins a bi-rhombic flap was deemed most appropriate.  Using a sterile surgical marker, an appropriate rhombic flap was drawn incorporating the defect. The area thus outlined was incised deep to adipose tissue with a #15 scalpel blade.  The skin margins were undermined to an appropriate distance in all directions utilizing iris scissors. Helical Rim Advancement Flap Text: The defect edges were debeveled with a #15 blade scalpel.  Given the location of the defect and the proximity to free margins (helical rim) a double helical rim advancement flap was deemed most appropriate.  Using a sterile surgical marker, the appropriate advancement flaps were drawn incorporating the defect and placing the expected incisions between the helical rim and antihelix where possible.  The area thus outlined was incised through and through with a #15 scalpel blade.  With a skin hook and iris scissors, the flaps were gently and sharply undermined and freed up. Bilateral Helical Rim Advancement Flap Text: The defect edges were debeveled with a #15 blade scalpel.  Given the location of the defect and the proximity to free margins (helical rim) a bilateral helical rim advancement flap was deemed most appropriate.  Using a sterile surgical marker, the appropriate advancement flaps were drawn incorporating the defect and placing the expected incisions between the helical rim and antihelix where possible.  The area thus outlined was incised through and through with a #15 scalpel blade.  With a skin hook and iris scissors, the flaps were gently and sharply undermined and freed up. Ear Star Wedge Flap Text: The defect edges were debeveled with a #15 blade scalpel.  Given the location of the defect and the proximity to free margins (helical rim) an ear star wedge flap was deemed most appropriate.  Using a sterile surgical marker, the appropriate flap was drawn incorporating the defect and placing the expected incisions between the helical rim and antihelix where possible.  The area thus outlined was incised through and through with a #15 scalpel blade. Flip-Flop Flap Text: The defect edges were debeveled with a #15 blade scalpel.  Given the location of the defect and the proximity to free margins a flip-flop flap was deemed most appropriate. Using a sterile surgical marker, the appropriate flap was drawn incorporating the defect and placing the expected incisions between the helical rim and antihelix where possible.  The area thus outlined was incised through and through with a #15 scalpel blade. Following this, the designed flap was carried over into the primary defect and sutured into place. Banner Transposition Flap Text: The defect edges were debeveled with a #15 scalpel blade.  Given the location of the defect and the proximity to free margins a Banner transposition flap was deemed most appropriate.  Using a sterile surgical marker, an appropriate flap drawn around the defect. The area thus outlined was incised deep to adipose tissue with a #15 scalpel blade.  The skin margins were undermined to an appropriate distance in all directions utilizing iris scissors. Bilobed Flap Text: The defect edges were debeveled with a #15 scalpel blade.  Given the location of the defect and the proximity to free margins a bilobe flap was deemed most appropriate.  Using a sterile surgical marker, an appropriate bilobe flap drawn around the defect.    The area thus outlined was incised deep to adipose tissue with a #15 scalpel blade.  The skin margins were undermined to an appropriate distance in all directions utilizing iris scissors. Bilobed Transposition Flap Text: The defect edges were debeveled with a #15 scalpel blade.  Given the location of the defect and the proximity to free margins a bilobed transposition flap was deemed most appropriate.  Using a sterile surgical marker, an appropriate bilobe flap drawn around the defect.    The area thus outlined was incised deep to adipose tissue with a #15 scalpel blade.  The skin margins were undermined to an appropriate distance in all directions utilizing iris scissors. Trilobed Flap Text: The defect edges were debeveled with a #15 scalpel blade.  Given the location of the defect and the proximity to free margins a trilobed flap was deemed most appropriate.  Using a sterile surgical marker, an appropriate trilobed flap drawn around the defect.    The area thus outlined was incised deep to adipose tissue with a #15 scalpel blade.  The skin margins were undermined to an appropriate distance in all directions utilizing iris scissors. Dorsal Nasal Flap Text: The defect edges were debeveled with a #15 scalpel blade.  Given the location of the defect and the proximity to free margins a dorsal nasal flap was deemed most appropriate.  Using a sterile surgical marker, an appropriate dorsal nasal flap was drawn around the defect.    The area thus outlined was incised deep to adipose tissue with a #15 scalpel blade.  The skin margins were undermined to an appropriate distance in all directions utilizing iris scissors. Island Pedicle Flap Text: The defect edges were debeveled with a #15 scalpel blade.  Given the location of the defect, shape of the defect and the proximity to free margins an island pedicle advancement flap was deemed most appropriate.  Using a sterile surgical marker, an appropriate advancement flap was drawn incorporating the defect, outlining the appropriate donor tissue and placing the expected incisions within the relaxed skin tension lines where possible.    The area thus outlined was incised deep to adipose tissue with a #15 scalpel blade.  The skin margins were undermined to an appropriate distance in all directions around the primary defect and laterally outward around the island pedicle utilizing iris scissors.  There was minimal undermining beneath the pedicle flap. Island Pedicle Flap With Canthal Suspension Text: The defect edges were debeveled with a #15 scalpel blade.  Given the location of the defect, shape of the defect and the proximity to free margins an island pedicle advancement flap was deemed most appropriate.  Using a sterile surgical marker, an appropriate advancement flap was drawn incorporating the defect, outlining the appropriate donor tissue and placing the expected incisions within the relaxed skin tension lines where possible. The area thus outlined was incised deep to adipose tissue with a #15 scalpel blade.  The skin margins were undermined to an appropriate distance in all directions around the primary defect and laterally outward around the island pedicle utilizing iris scissors.  There was minimal undermining beneath the pedicle flap. A suspension suture was placed in the canthal tendon to prevent tension and prevent ectropion. Alar Island Pedicle Flap Text: The defect edges were debeveled with a #15 scalpel blade.  Given the location of the defect, shape of the defect and the proximity to the alar rim an island pedicle advancement flap was deemed most appropriate.  Using a sterile surgical marker, an appropriate advancement flap was drawn incorporating the defect, outlining the appropriate donor tissue and placing the expected incisions within the nasal ala running parallel to the alar rim. The area thus outlined was incised with a #15 scalpel blade.  The skin margins were undermined minimally to an appropriate distance in all directions around the primary defect and laterally outward around the island pedicle utilizing iris scissors.  There was minimal undermining beneath the pedicle flap. Double Island Pedicle Flap Text: The defect edges were debeveled with a #15 scalpel blade.  Given the location of the defect, shape of the defect and the proximity to free margins a double island pedicle advancement flap was deemed most appropriate.  Using a sterile surgical marker, an appropriate advancement flap was drawn incorporating the defect, outlining the appropriate donor tissue and placing the expected incisions within the relaxed skin tension lines where possible.    The area thus outlined was incised deep to adipose tissue with a #15 scalpel blade.  The skin margins were undermined to an appropriate distance in all directions around the primary defect and laterally outward around the island pedicle utilizing iris scissors.  There was minimal undermining beneath the pedicle flap. Island Pedicle Flap-Requiring Vessel Identification Text: The defect edges were debeveled with a #15 scalpel blade.  Given the location of the defect, shape of the defect and the proximity to free margins an island pedicle advancement flap was deemed most appropriate.  Using a sterile surgical marker, an appropriate advancement flap was drawn, based on the axial vessel mentioned above, incorporating the defect, outlining the appropriate donor tissue and placing the expected incisions within the relaxed skin tension lines where possible.    The area thus outlined was incised deep to adipose tissue with a #15 scalpel blade.  The skin margins were undermined to an appropriate distance in all directions around the primary defect and laterally outward around the island pedicle utilizing iris scissors.  There was minimal undermining beneath the pedicle flap. Keystone Flap Text: The defect edges were debeveled with a #15 scalpel blade.  Given the location of the defect, shape of the defect a keystone flap was deemed most appropriate.  Using a sterile surgical marker, an appropriate keystone flap was drawn incorporating the defect, outlining the appropriate donor tissue and placing the expected incisions within the relaxed skin tension lines where possible. The area thus outlined was incised deep to adipose tissue with a #15 scalpel blade.  The skin margins were undermined to an appropriate distance in all directions around the primary defect and laterally outward around the flap utilizing iris scissors. O-T Plasty Text: The defect edges were debeveled with a #15 scalpel blade.  Given the location of the defect, shape of the defect and the proximity to free margins an O-T plasty was deemed most appropriate.  Using a sterile surgical marker, an appropriate O-T plasty was drawn incorporating the defect and placing the expected incisions within the relaxed skin tension lines where possible.    The area thus outlined was incised deep to adipose tissue with a #15 scalpel blade.  The skin margins were undermined to an appropriate distance in all directions utilizing iris scissors. O-Z Plasty Text: The defect edges were debeveled with a #15 scalpel blade.  Given the location of the defect, shape of the defect and the proximity to free margins an O-Z plasty (double transposition flap) was deemed most appropriate.  Using a sterile surgical marker, the appropriate transposition flaps were drawn incorporating the defect and placing the expected incisions within the relaxed skin tension lines where possible.    The area thus outlined was incised deep to adipose tissue with a #15 scalpel blade.  The skin margins were undermined to an appropriate distance in all directions utilizing iris scissors.  Hemostasis was achieved with electrocautery.  The flaps were then transposed into place, one clockwise and the other counterclockwise, and anchored with interrupted buried subcutaneous sutures. Double O-Z Plasty Text: The defect edges were debeveled with a #15 scalpel blade.  Given the location of the defect, shape of the defect and the proximity to free margins a Double O-Z plasty (double transposition flap) was deemed most appropriate.  Using a sterile surgical marker, the appropriate transposition flaps were drawn incorporating the defect and placing the expected incisions within the relaxed skin tension lines where possible. The area thus outlined was incised deep to adipose tissue with a #15 scalpel blade.  The skin margins were undermined to an appropriate distance in all directions utilizing iris scissors.  Hemostasis was achieved with electrocautery.  The flaps were then transposed into place, one clockwise and the other counterclockwise, and anchored with interrupted buried subcutaneous sutures. V-Y Plasty Text: The defect edges were debeveled with a #15 scalpel blade.  Given the location of the defect, shape of the defect and the proximity to free margins an V-Y advancement flap was deemed most appropriate.  Using a sterile surgical marker, an appropriate advancement flap was drawn incorporating the defect and placing the expected incisions within the relaxed skin tension lines where possible.    The area thus outlined was incised deep to adipose tissue with a #15 scalpel blade.  The skin margins were undermined to an appropriate distance in all directions utilizing iris scissors. H Plasty Text: Given the location of the defect, shape of the defect and the proximity to free margins a H-plasty was deemed most appropriate for repair.  Using a sterile surgical marker, the appropriate advancement arms of the H-plasty were drawn incorporating the defect and placing the expected incisions within the relaxed skin tension lines where possible. The area thus outlined was incised deep to adipose tissue with a #15 scalpel blade. The skin margins were undermined to an appropriate distance in all directions utilizing iris scissors.  The opposing advancement arms were then advanced into place in opposite direction and anchored with interrupted buried subcutaneous sutures. W Plasty Text: The lesion was extirpated to the level of the fat with a #15 scalpel blade.  Given the location of the defect, shape of the defect and the proximity to free margins a W-plasty was deemed most appropriate for repair.  Using a sterile surgical marker, the appropriate transposition arms of the W-plasty were drawn incorporating the defect and placing the expected incisions within the relaxed skin tension lines where possible.    The area thus outlined was incised deep to adipose tissue with a #15 scalpel blade.  The skin margins were undermined to an appropriate distance in all directions utilizing iris scissors.  The opposing transposition arms were then transposed into place in opposite direction and anchored with interrupted buried subcutaneous sutures. Z Plasty Text: The lesion was extirpated to the level of the fat with a #15 scalpel blade.  Given the location of the defect, shape of the defect and the proximity to free margins a Z-plasty was deemed most appropriate for repair.  Using a sterile surgical marker, the appropriate transposition arms of the Z-plasty were drawn incorporating the defect and placing the expected incisions within the relaxed skin tension lines where possible.    The area thus outlined was incised deep to adipose tissue with a #15 scalpel blade.  The skin margins were undermined to an appropriate distance in all directions utilizing iris scissors.  The opposing transposition arms were then transposed into place in opposite direction and anchored with interrupted buried subcutaneous sutures. Double Z Plasty Text: The lesion was extirpated to the level of the fat with a #15 scalpel blade. Given the location of the defect, shape of the defect and the proximity to free margins a double Z-plasty was deemed most appropriate for repair. Using a sterile surgical marker, the appropriate transposition arms of the double Z-plasty were drawn incorporating the defect and placing the expected incisions within the relaxed skin tension lines where possible. The area thus outlined was incised deep to adipose tissue with a #15 scalpel blade. The skin margins were undermined to an appropriate distance in all directions utilizing iris scissors. The opposing transposition arms were then transposed and carried over into place in opposite direction and anchored with interrupted buried subcutaneous sutures. Zygomaticofacial Flap Text: Given the location of the defect, shape of the defect and the proximity to free margins a zygomaticofacial flap was deemed most appropriate for repair.  Using a sterile surgical marker, the appropriate flap was drawn incorporating the defect and placing the expected incisions within the relaxed skin tension lines where possible. The area thus outlined was incised deep to adipose tissue with a #15 scalpel blade with preservation of a vascular pedicle.  The skin margins were undermined to an appropriate distance in all directions utilizing iris scissors.  The flap was then placed into the defect and anchored with interrupted buried subcutaneous sutures. Cheek Interpolation Flap Text: A decision was made to reconstruct the defect utilizing an interpolation axial flap and a staged reconstruction.  A telfa template was made of the defect.  This telfa template was then used to outline the Cheek Interpolation flap.  The donor area for the pedicle flap was then injected with anesthesia.  The flap was excised through the skin and subcutaneous tissue down to the layer of the underlying musculature.  The interpolation flap was carefully excised within this deep plane to maintain its blood supply.  The edges of the donor site were undermined.   The donor site was closed in a primary fashion.  The pedicle was then rotated into position and sutured.  Once the tube was sutured into place, adequate blood supply was confirmed with blanching and refill.  The pedicle was then wrapped with xeroform gauze and dressed appropriately with a telfa and gauze bandage to ensure continued blood supply and protect the attached pedicle. Cheek-To-Nose Interpolation Flap Text: A decision was made to reconstruct the defect utilizing an interpolation axial flap and a staged reconstruction.  A telfa template was made of the defect.  This telfa template was then used to outline the Cheek-To-Nose Interpolation flap.  The donor area for the pedicle flap was then injected with anesthesia.  The flap was excised through the skin and subcutaneous tissue down to the layer of the underlying musculature.  The interpolation flap was carefully excised within this deep plane to maintain its blood supply.  The edges of the donor site were undermined.   The donor site was closed in a primary fashion.  The pedicle was then rotated into position and sutured.  Once the tube was sutured into place, adequate blood supply was confirmed with blanching and refill.  The pedicle was then wrapped with xeroform gauze and dressed appropriately with a telfa and gauze bandage to ensure continued blood supply and protect the attached pedicle. Interpolation Flap Text: A decision was made to reconstruct the defect utilizing an interpolation axial flap and a staged reconstruction.  A telfa template was made of the defect.  This telfa template was then used to outline the interpolation flap.  The donor area for the pedicle flap was then injected with anesthesia.  The flap was excised through the skin and subcutaneous tissue down to the layer of the underlying musculature.  The interpolation flap was carefully excised within this deep plane to maintain its blood supply.  The edges of the donor site were undermined.   The donor site was closed in a primary fashion.  The pedicle was then rotated into position and sutured.  Once the tube was sutured into place, adequate blood supply was confirmed with blanching and refill.  The pedicle was then wrapped with xeroform gauze and dressed appropriately with a telfa and gauze bandage to ensure continued blood supply and protect the attached pedicle. Melolabial Interpolation Flap Text: A decision was made to reconstruct the defect utilizing an interpolation axial flap and a staged reconstruction.  A telfa template was made of the defect.  This telfa template was then used to outline the melolabial interpolation flap.  The donor area for the pedicle flap was then injected with anesthesia.  The flap was excised through the skin and subcutaneous tissue down to the layer of the underlying musculature.  The pedicle flap was carefully excised within this deep plane to maintain its blood supply.  The edges of the donor site were undermined.   The donor site was closed in a primary fashion.  The pedicle was then rotated into position and sutured.  Once the tube was sutured into place, adequate blood supply was confirmed with blanching and refill.  The pedicle was then wrapped with xeroform gauze and dressed appropriately with a telfa and gauze bandage to ensure continued blood supply and protect the attached pedicle. Mastoid Interpolation Flap Text: A decision was made to reconstruct the defect utilizing an interpolation axial flap and a staged reconstruction.  A telfa template was made of the defect.  This telfa template was then used to outline the mastoid interpolation flap.  The donor area for the pedicle flap was then injected with anesthesia.  The flap was excised through the skin and subcutaneous tissue down to the layer of the underlying musculature.  The pedicle flap was carefully excised within this deep plane to maintain its blood supply.  The edges of the donor site were undermined.   The donor site was closed in a primary fashion.  The pedicle was then rotated into position and sutured.  Once the tube was sutured into place, adequate blood supply was confirmed with blanching and refill.  The pedicle was then wrapped with xeroform gauze and dressed appropriately with a telfa and gauze bandage to ensure continued blood supply and protect the attached pedicle. Posterior Auricular Interpolation Flap Text: A decision was made to reconstruct the defect utilizing an interpolation axial flap and a staged reconstruction.  A telfa template was made of the defect.  This telfa template was then used to outline the posterior auricular interpolation flap.  The donor area for the pedicle flap was then injected with anesthesia.  The flap was excised through the skin and subcutaneous tissue down to the layer of the underlying musculature.  The pedicle flap was carefully excised within this deep plane to maintain its blood supply.  The edges of the donor site were undermined.   The donor site was closed in a primary fashion.  The pedicle was then rotated into position and sutured.  Once the tube was sutured into place, adequate blood supply was confirmed with blanching and refill.  The pedicle was then wrapped with xeroform gauze and dressed appropriately with a telfa and gauze bandage to ensure continued blood supply and protect the attached pedicle. Paramedian Forehead Flap Text: A decision was made to reconstruct the defect utilizing an interpolation axial flap and a staged reconstruction.  A telfa template was made of the defect.  This telfa template was then used to outline the paramedian forehead pedicle flap.  The donor area for the pedicle flap was then injected with anesthesia.  The flap was excised through the skin and subcutaneous tissue down to the layer of the underlying musculature.  The pedicle flap was carefully excised within this deep plane to maintain its blood supply.  The edges of the donor site were undermined.   The donor site was closed in a primary fashion.  The pedicle was then rotated into position and sutured.  Once the tube was sutured into place, adequate blood supply was confirmed with blanching and refill.  The pedicle was then wrapped with xeroform gauze and dressed appropriately with a telfa and gauze bandage to ensure continued blood supply and protect the attached pedicle. Abbe Flap (Upper To Lower Lip) Text: The defect of the lower lip was assessed and measured.  Given the location and size of the defect, an Abbe flap was deemed most appropriate.  Using a sterile surgical marker, an appropriate Abbe flap was measured and drawn on the upper lip. Local anesthesia was then infiltrated.  A scalpel was then used to incise the upper lip through and through the skin, vermilion, muscle and mucosa, leaving the flap pedicled on the opposite side.  The flap was then rotated and transferred to the lower lip defect.  The flap was then sutured into place with a three layer technique, closing the orbicularis oris muscle layer with subcutaneous buried sutures, followed by a mucosal layer and an epidermal layer. Abbe Flap (Lower To Upper Lip) Text: The defect of the upper lip was assessed and measured.  Given the location and size of the defect, an Abbe flap was deemed most appropriate.  Using a sterile surgical marker, an appropriate Abbe flap was measured and drawn on the lower lip. Local anesthesia was then infiltrated. A scalpel was then used to incise the upper lip through and through the skin, vermilion, muscle and mucosa, leaving the flap pedicled on the opposite side.  The flap was then rotated and transferred to the lower lip defect.  The flap was then sutured into place with a three layer technique, closing the orbicularis oris muscle layer with subcutaneous buried sutures, followed by a mucosal layer and an epidermal layer. Estlander Flap (Upper To Lower Lip) Text: The defect of the lower lip was assessed and measured.  Given the location and size of the defect, an Estlander flap was deemed most appropriate.  Using a sterile surgical marker, an appropriate Estlander flap was measured and drawn on the upper lip. Local anesthesia was then infiltrated. A scalpel was then used to incise the lateral aspect of the flap, through skin, muscle and mucosa, leaving the flap pedicled medially.  The flap was then rotated and positioned to fill the lower lip defect.  The flap was then sutured into place with a three layer technique, closing the orbicularis oris muscle layer with subcutaneous buried sutures, followed by a mucosal layer and an epidermal layer. Lip Wedge Excision Repair Text: Given the location of the defect and the proximity to free margins a full thickness wedge repair was deemed most appropriate.  Using a sterile surgical marker, the appropriate repair was drawn incorporating the defect and placing the expected incisions perpendicular to the vermilion border.  The vermilion border was also meticulously outlined to ensure appropriate reapproximation during the repair.  The area thus outlined was incised through and through with a #15 scalpel blade.  The muscularis and dermis were reaproximated with deep sutures following hemostasis. Care was taken to realign the vermilion border before proceeding with the superficial closure.  Once the vermilion was realigned the superfical and mucosal closure was finished. Ftsg Text: The defect edges were debeveled with a #15 scalpel blade.  Given the location of the defect, shape of the defect and the proximity to free margins a full thickness skin graft was deemed most appropriate.  Using a sterile surgical marker, the primary defect shape was transferred to the donor site. The area thus outlined was incised deep to adipose tissue with a #15 scalpel blade.  The harvested graft was then trimmed of adipose tissue until only dermis and epidermis was left.  The skin margins of the secondary defect were undermined to an appropriate distance in all directions utilizing iris scissors.  The secondary defect was closed with interrupted buried subcutaneous sutures.  The skin edges were then re-apposed with running  sutures.  The skin graft was then placed in the primary defect and oriented appropriately. Split-Thickness Skin Graft Text: The defect edges were debeveled with a #15 scalpel blade.  Given the location of the defect, shape of the defect and the proximity to free margins a split thickness skin graft was deemed most appropriate.  Using a sterile surgical marker, the primary defect shape was transferred to the donor site. The split thickness graft was then harvested.  The skin graft was then placed in the primary defect and oriented appropriately. Pinch Graft Text: The defect edges were debeveled with a #15 scalpel blade. Given the location of the defect, shape of the defect and the proximity to free margins a pinch graft was deemed most appropriate. Using a sterile surgical marker, the primary defect shape was transferred to the donor site. The area thus outlined was incised deep to adipose tissue with a #15 scalpel blade.  The harvested graft was then trimmed of adipose tissue until only dermis and epidermis was left. The skin margins of the secondary defect were undermined to an appropriate distance in all directions utilizing iris scissors.  The secondary defect was closed with interrupted buried subcutaneous sutures.  The skin edges were then re-apposed with running  sutures.  The skin graft was then placed in the primary defect and oriented appropriately. Burow's Graft Text: The defect edges were debeveled with a #15 scalpel blade.  Given the location of the defect, shape of the defect, the proximity to free margins and the presence of a standing cone deformity a Burow's skin graft was deemed most appropriate. The standing cone was removed and this tissue was then trimmed to the shape of the primary defect. The adipose tissue was also removed until only dermis and epidermis were left.  The skin margins of the secondary defect were undermined to an appropriate distance in all directions utilizing iris scissors.  The secondary defect was closed with interrupted buried subcutaneous sutures.  The skin edges were then re-apposed with running  sutures.  The skin graft was then placed in the primary defect and oriented appropriately. Cartilage Graft Text: The defect edges were debeveled with a #15 scalpel blade.  Given the location of the defect, shape of the defect, the fact the defect involved a full thickness cartilage defect a cartilage graft was deemed most appropriate.  An appropriate donor site was identified, cleansed, and anesthetized. The cartilage graft was then harvested and transferred to the recipient site, oriented appropriately and then sutured into place.  The secondary defect was then repaired using a primary closure. Composite Graft Text: The defect edges were debeveled with a #15 scalpel blade.  Given the location of the defect, shape of the defect, the proximity to free margins and the fact the defect was full thickness a composite graft was deemed most appropriate.  The defect was outline and then transferred to the donor site.  A full thickness graft was then excised from the donor site. The graft was then placed in the primary defect, oriented appropriately and then sutured into place.  The secondary defect was then repaired using a primary closure. Epidermal Autograft Text: The defect edges were debeveled with a #15 scalpel blade.  Given the location of the defect, shape of the defect and the proximity to free margins an epidermal autograft was deemed most appropriate.  Using a sterile surgical marker, the primary defect shape was transferred to the donor site. The epidermal graft was then harvested.  The skin graft was then placed in the primary defect and oriented appropriately. Dermal Autograft Text: The defect edges were debeveled with a #15 scalpel blade.  Given the location of the defect, shape of the defect and the proximity to free margins a dermal autograft was deemed most appropriate.  Using a sterile surgical marker, the primary defect shape was transferred to the donor site. The area thus outlined was incised deep to adipose tissue with a #15 scalpel blade.  The harvested graft was then trimmed of adipose and epidermal tissue until only dermis was left.  The skin graft was then placed in the primary defect and oriented appropriately. Skin Substitute Text: The defect edges were debeveled with a #15 scalpel blade.  Given the location of the defect, shape of the defect and the proximity to free margins a skin substitute graft was deemed most appropriate.  The graft material was trimmed to fit the size of the defect. The graft was then placed in the primary defect and oriented appropriately. Tissue Cultured Epidermal Autograft Text: The defect edges were debeveled with a #15 scalpel blade.  Given the location of the defect, shape of the defect and the proximity to free margins a tissue cultured epidermal autograft was deemed most appropriate.  The graft was then trimmed to fit the size of the defect.  The graft was then placed in the primary defect and oriented appropriately. Xenograft Text: The defect edges were debeveled with a #15 scalpel blade.  Given the location of the defect, shape of the defect and the proximity to free margins a xenograft was deemed most appropriate.  The graft was then trimmed to fit the size of the defect.  The graft was then placed in the primary defect and oriented appropriately. Purse String (Intermediate) Text: Given the location of the defect and the characteristics of the surrounding skin a purse string intermediate closure was deemed most appropriate.  Undermining was performed circumferentially around the surgical defect.  A purse string suture was then placed and tightened. Purse String (Simple) Text: Given the location of the defect and the characteristics of the surrounding skin a purse string simple closure was deemed most appropriate.  Undermining was performed circumferentially around the surgical defect.  A purse string suture was then placed and tightened. Complex Repair And Single Advancement Flap Text: The defect edges were debeveled with a #15 scalpel blade.  The primary defect was closed partially with a complex linear closure.  Given the location of the remaining defect, shape of the defect and the proximity to free margins a single advancement flap was deemed most appropriate for complete closure of the defect.  Using a sterile surgical marker, an appropriate advancement flap was drawn incorporating the defect and placing the expected incisions within the relaxed skin tension lines where possible.    The area thus outlined was incised deep to adipose tissue with a #15 scalpel blade.  The skin margins were undermined to an appropriate distance in all directions utilizing iris scissors. Complex Repair And Double Advancement Flap Text: The defect edges were debeveled with a #15 scalpel blade.  The primary defect was closed partially with a complex linear closure.  Given the location of the remaining defect, shape of the defect and the proximity to free margins a double advancement flap was deemed most appropriate for complete closure of the defect.  Using a sterile surgical marker, an appropriate advancement flap was drawn incorporating the defect and placing the expected incisions within the relaxed skin tension lines where possible.    The area thus outlined was incised deep to adipose tissue with a #15 scalpel blade.  The skin margins were undermined to an appropriate distance in all directions utilizing iris scissors. Complex Repair And Modified Advancement Flap Text: The defect edges were debeveled with a #15 scalpel blade.  The primary defect was closed partially with a complex linear closure.  Given the location of the remaining defect, shape of the defect and the proximity to free margins a modified advancement flap was deemed most appropriate for complete closure of the defect.  Using a sterile surgical marker, an appropriate advancement flap was drawn incorporating the defect and placing the expected incisions within the relaxed skin tension lines where possible.    The area thus outlined was incised deep to adipose tissue with a #15 scalpel blade.  The skin margins were undermined to an appropriate distance in all directions utilizing iris scissors. Complex Repair And A-T Advancement Flap Text: The defect edges were debeveled with a #15 scalpel blade.  The primary defect was closed partially with a complex linear closure.  Given the location of the remaining defect, shape of the defect and the proximity to free margins an A-T advancement flap was deemed most appropriate for complete closure of the defect.  Using a sterile surgical marker, an appropriate advancement flap was drawn incorporating the defect and placing the expected incisions within the relaxed skin tension lines where possible.    The area thus outlined was incised deep to adipose tissue with a #15 scalpel blade.  The skin margins were undermined to an appropriate distance in all directions utilizing iris scissors. Complex Repair And O-T Advancement Flap Text: The defect edges were debeveled with a #15 scalpel blade.  The primary defect was closed partially with a complex linear closure.  Given the location of the remaining defect, shape of the defect and the proximity to free margins an O-T advancement flap was deemed most appropriate for complete closure of the defect.  Using a sterile surgical marker, an appropriate advancement flap was drawn incorporating the defect and placing the expected incisions within the relaxed skin tension lines where possible.    The area thus outlined was incised deep to adipose tissue with a #15 scalpel blade.  The skin margins were undermined to an appropriate distance in all directions utilizing iris scissors. Complex Repair And O-L Flap Text: The defect edges were debeveled with a #15 scalpel blade.  The primary defect was closed partially with a complex linear closure.  Given the location of the remaining defect, shape of the defect and the proximity to free margins an O-L flap was deemed most appropriate for complete closure of the defect.  Using a sterile surgical marker, an appropriate flap was drawn incorporating the defect and placing the expected incisions within the relaxed skin tension lines where possible.    The area thus outlined was incised deep to adipose tissue with a #15 scalpel blade.  The skin margins were undermined to an appropriate distance in all directions utilizing iris scissors. Complex Repair And Bilobe Flap Text: The defect edges were debeveled with a #15 scalpel blade.  The primary defect was closed partially with a complex linear closure.  Given the location of the remaining defect, shape of the defect and the proximity to free margins a bilobe flap was deemed most appropriate for complete closure of the defect.  Using a sterile surgical marker, an appropriate advancement flap was drawn incorporating the defect and placing the expected incisions within the relaxed skin tension lines where possible.    The area thus outlined was incised deep to adipose tissue with a #15 scalpel blade.  The skin margins were undermined to an appropriate distance in all directions utilizing iris scissors. Complex Repair And Melolabial Flap Text: The defect edges were debeveled with a #15 scalpel blade.  The primary defect was closed partially with a complex linear closure.  Given the location of the remaining defect, shape of the defect and the proximity to free margins a melolabial flap was deemed most appropriate for complete closure of the defect.  Using a sterile surgical marker, an appropriate advancement flap was drawn incorporating the defect and placing the expected incisions within the relaxed skin tension lines where possible.    The area thus outlined was incised deep to adipose tissue with a #15 scalpel blade.  The skin margins were undermined to an appropriate distance in all directions utilizing iris scissors. Complex Repair And Rotation Flap Text: The defect edges were debeveled with a #15 scalpel blade.  The primary defect was closed partially with a complex linear closure.  Given the location of the remaining defect, shape of the defect and the proximity to free margins a rotation flap was deemed most appropriate for complete closure of the defect.  Using a sterile surgical marker, an appropriate advancement flap was drawn incorporating the defect and placing the expected incisions within the relaxed skin tension lines where possible.    The area thus outlined was incised deep to adipose tissue with a #15 scalpel blade.  The skin margins were undermined to an appropriate distance in all directions utilizing iris scissors. Complex Repair And Rhombic Flap Text: The defect edges were debeveled with a #15 scalpel blade.  The primary defect was closed partially with a complex linear closure.  Given the location of the remaining defect, shape of the defect and the proximity to free margins a rhombic flap was deemed most appropriate for complete closure of the defect.  Using a sterile surgical marker, an appropriate advancement flap was drawn incorporating the defect and placing the expected incisions within the relaxed skin tension lines where possible.    The area thus outlined was incised deep to adipose tissue with a #15 scalpel blade.  The skin margins were undermined to an appropriate distance in all directions utilizing iris scissors. Complex Repair And Transposition Flap Text: The defect edges were debeveled with a #15 scalpel blade.  The primary defect was closed partially with a complex linear closure.  Given the location of the remaining defect, shape of the defect and the proximity to free margins a transposition flap was deemed most appropriate for complete closure of the defect.  Using a sterile surgical marker, an appropriate advancement flap was drawn incorporating the defect and placing the expected incisions within the relaxed skin tension lines where possible.    The area thus outlined was incised deep to adipose tissue with a #15 scalpel blade.  The skin margins were undermined to an appropriate distance in all directions utilizing iris scissors. Complex Repair And V-Y Plasty Text: The defect edges were debeveled with a #15 scalpel blade.  The primary defect was closed partially with a complex linear closure.  Given the location of the remaining defect, shape of the defect and the proximity to free margins a V-Y plasty was deemed most appropriate for complete closure of the defect.  Using a sterile surgical marker, an appropriate advancement flap was drawn incorporating the defect and placing the expected incisions within the relaxed skin tension lines where possible.    The area thus outlined was incised deep to adipose tissue with a #15 scalpel blade.  The skin margins were undermined to an appropriate distance in all directions utilizing iris scissors. Complex Repair And M Plasty Text: The defect edges were debeveled with a #15 scalpel blade.  The primary defect was closed partially with a complex linear closure.  Given the location of the remaining defect, shape of the defect and the proximity to free margins an M plasty was deemed most appropriate for complete closure of the defect.  Using a sterile surgical marker, an appropriate advancement flap was drawn incorporating the defect and placing the expected incisions within the relaxed skin tension lines where possible.    The area thus outlined was incised deep to adipose tissue with a #15 scalpel blade.  The skin margins were undermined to an appropriate distance in all directions utilizing iris scissors. Complex Repair And Double M Plasty Text: The defect edges were debeveled with a #15 scalpel blade.  The primary defect was closed partially with a complex linear closure.  Given the location of the remaining defect, shape of the defect and the proximity to free margins a double M plasty was deemed most appropriate for complete closure of the defect.  Using a sterile surgical marker, an appropriate advancement flap was drawn incorporating the defect and placing the expected incisions within the relaxed skin tension lines where possible.    The area thus outlined was incised deep to adipose tissue with a #15 scalpel blade.  The skin margins were undermined to an appropriate distance in all directions utilizing iris scissors. Complex Repair And W Plasty Text: The defect edges were debeveled with a #15 scalpel blade.  The primary defect was closed partially with a complex linear closure.  Given the location of the remaining defect, shape of the defect and the proximity to free margins a W plasty was deemed most appropriate for complete closure of the defect.  Using a sterile surgical marker, an appropriate advancement flap was drawn incorporating the defect and placing the expected incisions within the relaxed skin tension lines where possible.    The area thus outlined was incised deep to adipose tissue with a #15 scalpel blade.  The skin margins were undermined to an appropriate distance in all directions utilizing iris scissors. Complex Repair And Z Plasty Text: The defect edges were debeveled with a #15 scalpel blade.  The primary defect was closed partially with a complex linear closure.  Given the location of the remaining defect, shape of the defect and the proximity to free margins a Z plasty was deemed most appropriate for complete closure of the defect.  Using a sterile surgical marker, an appropriate advancement flap was drawn incorporating the defect and placing the expected incisions within the relaxed skin tension lines where possible.    The area thus outlined was incised deep to adipose tissue with a #15 scalpel blade.  The skin margins were undermined to an appropriate distance in all directions utilizing iris scissors. Complex Repair And Dorsal Nasal Flap Text: The defect edges were debeveled with a #15 scalpel blade.  The primary defect was closed partially with a complex linear closure.  Given the location of the remaining defect, shape of the defect and the proximity to free margins a dorsal nasal flap was deemed most appropriate for complete closure of the defect.  Using a sterile surgical marker, an appropriate flap was drawn incorporating the defect and placing the expected incisions within the relaxed skin tension lines where possible.    The area thus outlined was incised deep to adipose tissue with a #15 scalpel blade.  The skin margins were undermined to an appropriate distance in all directions utilizing iris scissors. Complex Repair And Ftsg Text: The defect edges were debeveled with a #15 scalpel blade.  The primary defect was closed partially with a complex linear closure.  Given the location of the defect, shape of the defect and the proximity to free margins a full thickness skin graft was deemed most appropriate to repair the remaining defect.  The graft was trimmed to fit the size of the remaining defect.  The graft was then placed in the primary defect, oriented appropriately, and sutured into place. Complex Repair And Burow's Graft Text: The defect edges were debeveled with a #15 scalpel blade.  The primary defect was closed partially with a complex linear closure.  Given the location of the defect, shape of the defect, the proximity to free margins and the presence of a standing cone deformity a Burow's graft was deemed most appropriate to repair the remaining defect.  The graft was trimmed to fit the size of the remaining defect.  The graft was then placed in the primary defect, oriented appropriately, and sutured into place. Complex Repair And Split-Thickness Skin Graft Text: The defect edges were debeveled with a #15 scalpel blade.  The primary defect was closed partially with a complex linear closure.  Given the location of the defect, shape of the defect and the proximity to free margins a split thickness skin graft was deemed most appropriate to repair the remaining defect.  The graft was trimmed to fit the size of the remaining defect.  The graft was then placed in the primary defect, oriented appropriately, and sutured into place. Complex Repair And Epidermal Autograft Text: The defect edges were debeveled with a #15 scalpel blade.  The primary defect was closed partially with a complex linear closure.  Given the location of the defect, shape of the defect and the proximity to free margins an epidermal autograft was deemed most appropriate to repair the remaining defect.  The graft was trimmed to fit the size of the remaining defect.  The graft was then placed in the primary defect, oriented appropriately, and sutured into place. Complex Repair And Dermal Autograft Text: The defect edges were debeveled with a #15 scalpel blade.  The primary defect was closed partially with a complex linear closure.  Given the location of the defect, shape of the defect and the proximity to free margins an dermal autograft was deemed most appropriate to repair the remaining defect.  The graft was trimmed to fit the size of the remaining defect.  The graft was then placed in the primary defect, oriented appropriately, and sutured into place. Complex Repair And Tissue Cultured Epidermal Autograft Text: The defect edges were debeveled with a #15 scalpel blade.  The primary defect was closed partially with a complex linear closure.  Given the location of the defect, shape of the defect and the proximity to free margins an tissue cultured epidermal autograft was deemed most appropriate to repair the remaining defect.  The graft was trimmed to fit the size of the remaining defect.  The graft was then placed in the primary defect, oriented appropriately, and sutured into place. Complex Repair And Xenograft Text: The defect edges were debeveled with a #15 scalpel blade.  The primary defect was closed partially with a complex linear closure.  Given the location of the defect, shape of the defect and the proximity to free margins a xenograft was deemed most appropriate to repair the remaining defect.  The graft was trimmed to fit the size of the remaining defect.  The graft was then placed in the primary defect, oriented appropriately, and sutured into place. Complex Repair And Skin Substitute Graft Text: The defect edges were debeveled with a #15 scalpel blade.  The primary defect was closed partially with a complex linear closure.  Given the location of the remaining defect, shape of the defect and the proximity to free margins a skin substitute graft was deemed most appropriate to repair the remaining defect.  The graft was trimmed to fit the size of the remaining defect.  The graft was then placed in the primary defect, oriented appropriately, and sutured into place. Include Anticoagulation In Mohs Note?: Please Select the Appropriate Response Path Notes (To The Dermatopathologist): Please check margins. Consent was obtained from the patient. The risks and benefits to therapy were discussed in detail. Specifically, the risks of infection, scarring, bleeding, prolonged wound healing, incomplete removal, allergy to anesthesia, nerve injury and recurrence were addressed. Prior to the procedure, the treatment site was clearly identified and confirmed by the patient. All components of Universal Protocol/PAUSE Rule completed. Post-Care Instructions: I reviewed with the patient in detail post-care instructions. Patient is not to engage in any heavy lifting, exercise, or swimming for the next 14 days. Should the patient develop any fevers, chills, bleeding, severe pain patient will contact the office immediately. Home Suture Removal Text: Patient was provided a home suture removal kit and will remove their sutures at home.  If they have any questions or difficulties they will call the office. Where Do You Want The Question To Include Opioid Counseling Located?: Case Summary Tab Billing Type: Third-Party Bill Information: Selecting Yes will display possible errors in your note based on the variables you have selected. This validation is only offered as a suggestion for you. PLEASE NOTE THAT THE VALIDATION TEXT WILL BE REMOVED WHEN YOU FINALIZE YOUR NOTE. IF YOU WANT TO FAX A PRELIMINARY NOTE YOU WILL NEED TO TOGGLE THIS TO 'NO' IF YOU DO NOT WANT IT IN YOUR FAXED NOTE.

## 2025-07-21 NOTE — H&P
[facility]    PATIENT'S NAME: Delmi Garrett PHYSICIAN: Clinton Lopez MD   PATIENT ACCOUNT#:   [id number]    LOCATION:  [address] [place] [address] RECORD #:   [id number]       YOB: 1950  ADMISSION DATE:       11/02/2017 Last Office Visit: 8/29/2024 Provider: KAMRYN  **Is provider OOT? No    Next Office Visit: 8/5/2025 Provider: KAMRYN    **Has patient already had 30 day supply? No    LAST LABS:   Liver:  Lab Results   Component Value Date    ALT 20 12/18/2024    AST 21 12/18/2024    ALKPHOS 88 12/18/2024    BILITOT 1.3 12/18/2024     Lipid:  Lab Results   Component Value Date    CHOL 161 12/18/2024    TRIG 99 12/18/2024    HDL 44 12/18/2024    LDL 99 12/18/2024    VLDL 18 12/18/2024     Lab orders needed? no            area for the last 2 weeks. Patient denies any fever or chills. He said he still can bend his knees to 20 degrees, but pain is limiting his range of motion. No fever or chills. Other 12-point review of system is negative.        PHYSICAL EXAMINATION:

## (undated) NOTE — LETTER
February 1, 2024      No Recipients     Patient: Martin Finley   YOB: 1950   Date of Visit: 2/1/2024       Dear Dr. Yepez Recipients:    Thank you for referring Martin Finley to me for evaluation. Here is my assessment and plan of care:    Martin Finley is a 73 year old male.    HPI:     HPI    Patient is here for a diabetic exam.  Patient states his vision is good.  He has no ocular complaints.        Pt has been a diabetic for 23 years       Pt's diabetes is currently controlled by pills   Pt checks BS a few times per week.   Pt's last blood sugar was 125 on 1/31/24  Last HA1C was 6.2 on 9/27/23  Endocrinologist: none     Last edited by Claribel Allen OT on 2/1/2024  9:15 AM.        Patient History:  Past Medical History:   Diagnosis Date    Amblyopia      Right Eye     Anxiety     Cataracts, bilateral 2005    per NG    Cellulitis of left foot     Depression     Diabetes mellitus type 2 without retinopathy (HCC) 2013, 2010, 2005    per NG    History of hepatitis A virus infection 1961    per NG    MGD (meibomian gland dysfunction)     per NG    Other and unspecified hyperlipidemia     per NG    Type II or unspecified type diabetes mellitus without mention of complication, not stated as uncontrolled 2004    per NG    Unspecified essential hypertension     per NG    Vitreous floaters of right eye     per NG       Surgical History: Martin Finley has a past surgical history that includes back surgery and colonoscopy (10/2007).    Family History   Problem Relation Age of Onset    Other (Hernia complications) Father 50        Cause of death; per NG    Diabetes Sister         per NG    Glaucoma Neg     Macular degeneration Neg        Social History:   Social History     Socioeconomic History    Marital status:    Tobacco Use    Smoking status: Never    Smokeless tobacco: Never   Vaping Use    Vaping Use: Never used   Substance and Sexual Activity    Alcohol use: Yes     Alcohol/week: 0.0  standard drinks of alcohol     Comment: 1 beer/month    Drug use: No    Sexual activity: Not Currently     Partners: Female   Other Topics Concern    Caffeine Concern Yes     Comment: Coffee, 2 cups; per NG    Reaction to local anesthetic No       Medications:  Current Outpatient Medications   Medication Sig Dispense Refill    escitalopram 5 MG Oral Tab Take 1 tablet (5 mg total) by mouth daily.      rosuvastatin 20 MG Oral Tab Take 1 tablet (20 mg total) by mouth nightly. 90 tablet 3    clopidogrel 75 MG Oral Tab Take 1 tablet (75 mg total) by mouth daily. 90 tablet 3    glimepiride 1 MG Oral Tab Half a tablet daily with breakfast 90 tablet 2    allopurinol 100 MG Oral Tab Take 1 tablet by mouth once daily 90 tablet 2    metFORMIN HCl  MG Oral Tablet 24 Hr Take 1 tablet (750 mg total) by mouth 2 (two) times daily with meals. 180 tablet 2    Glucosamine-Chondroitin (GLUCOSAMINE CHONDR COMPLEX OR) Take by mouth.      Glucose Blood (VINNIE CONTOUR TEST) In Vitro Strip USE ONE STRIP TO CHECK GLUCOSE TWICE DAILY 100 strip 3    omega-3 fatty acids 1000 MG Oral Cap Take 1,000 mg by mouth daily.      aspirin 81 MG Oral Tab EC Take by mouth at bedtime. take 1 tablet (81MG)  by oral route  every day      Cholecalciferol 50 MCG (2000 UT) Oral Tab Take  by mouth. take 1 by Oral route  every day         Allergies:  No Known Allergies    ROS:       PHYSICAL EXAM:     Base Eye Exam       Visual Acuity (Snellen - Linear)         Right Left    Dist cc 20/80 20/40    Dist ph cc 20/70 +2 20/30    Near cc 20/25 20/20      Correction: Glasses              Tonometry (Applanation, 9:25 AM)         Right Left    Pressure 18 18              Pachymetry (1/12/2017)         Right Left    Thickness 613/-5 611/-4.5              Pupils         Pupils    Right PERRL    Left PERRL              Visual Fields         Left Right     Full Full              Extraocular Movement         Right Left     Full, Ortho Full, Ortho               Neuro/Psych       Oriented x3: Yes              Dilation       Both eyes: 1.0% Mydriacyl and 2.5% Matheus Synephrine @ 9:26 AM                  Slit Lamp and Fundus Exam       Slit Lamp Exam         Right Left    Lids/Lashes Dermatochalasis, Meibomian gland dysfunction Dermatochalasis, Meibomian gland dysfunction    Conjunctiva/Sclera Normal Normal    Cornea Clear Clear    Anterior Chamber Deep and quiet Deep and quiet    Iris Normal Normal    Lens 3+ Nuclear sclerosis 3+ Nuclear sclerosis    Vitreous Vitreous floaters Vitreous floaters              Fundus Exam         Right Left    Disc Sloping margin, Temporal crescent Sloping margin, Temporal crescent    C/D Ratio 0.8 0.65    Macula Normal- no BDR Normal- no BDR    Vessels Normal Normal    Periphery Normal Normal                  Refraction       Wearing Rx         Sphere Cylinder Axis Add    Right -3.75 +1.25 070 +3.00    Left -4.50 +1.25 110 +3.00      Type: Flat top bifocal              Manifest Refraction    Patient is happy with his glasses. He declines refraction.                    ASSESSMENT/PLAN:     Diagnoses and Plan:     Diabetes mellitus type 2 without retinopathy (HCC)  Diabetes type II: no background of retinopathy, no signs of neovascularization noted.  Discussed ocular and systemic benefits of blood sugar control.  Diagnosis and treatment discussed in detail with patient.    Will see patient in 1 year for a diabetic exam and photos    Glaucoma suspect of both eyes  Discussed with patient that he is a glaucoma suspect based on increased cupping of the optic nerves in both eyes.   Will not start medication, but will continue to observe.  Patient verbalized understanding.      Amblyopia, right  Longstanding; no treatment.     Vitreous floaters of both eyes   There is no evidence of retinal pathology.  All signs and symptoms of retinal detachment/tears explained in detail.    Patient instructed to call the office if they experience increase in floaters,  increase in flashes of light, loss of vision or curtain or veil effect.       No orders of the defined types were placed in this encounter.      Meds This Visit:  Requested Prescriptions      No prescriptions requested or ordered in this encounter        Follow up instructions:  Return in about 1 year (around 2/1/2025) for Diabetic eye exam, Photos.    2/1/2024  Scribed by: Zhao Bhat MD        If you have questions, please do not hesitate to call me. I look forward to following Martin along with you.    Sincerely,        Zhao Bhat MD        CC:   No Recipients    Document electronically generated by: Zhao Bhat MD

## (undated) NOTE — Clinical Note
1/31/2017              [address] [place] [address]         Dear Kyle Reynolds,    This medical office is an off-site department of [facility].  Since this is an offsite [address] you will r [phone] Discission of secondary Cataract with Laser $70.92  60168 Correct Trichiasis, Epilation.  Forceps only  $12.50  [id number] Excision of Lesion, eyelid without closure or with simple    direct closure $36.84  90882 Closure of lacrimal punctum by plug, each e

## (undated) NOTE — LETTER
September 28, 2021    Jayden Phoenix MD  [facility]     Patient: Leticia Mccloud   YOB: 1950   Date of Visit: 9/28/2021       Dear Dr. Harrison Noonan MD:    Thank you for referring Radha Daryl to me for evaluation. status: Never Smoker      Smokeless tobacco: Never Used    Vaping Use      Vaping Use: Never used    Alcohol use:  Yes      Alcohol/week: 0.0 standard drinks      Comment: 1 beer/month    Drug use: No      Medications:  Current Outpatient Medications   Medi Visual Fields       Left Right     Full Full          Extraocular Movement       Right Left     Full, Ortho Full, Ortho          Neuro/Psych     Oriented x3: Yes          Dilation     Both eyes: 1.0% Mydriacyl and 2.5% Matheus Synephrine @ 2:09 PM            S benefits of blood sugar control. Diagnosis and treatment discussed in detail with patient. Age-related nuclear cataract of both eyes  Discussed moderate cataracts in both eyes that are  affecting vision, but are not surgical at this time.     New glas

## (undated) NOTE — LETTER
11/27/19        [address]  [address] [address]      Dear Ira Villalta,    [facility] records indicate that you have outstanding lab work and or testing that was ordered for you and has not yet been completed:  Orders Placed This Encounter

## (undated) NOTE — MR AVS SNAPSHOT
Kristal  [address]  [phone]               Thank you for choosing us for your health care visit with [address] [place] [address].   We are glad to serve you and happy to provide you with [facility] omega-3 fatty acids 1000 MG Caps   Take 1,000 mg by mouth daily.    Commonly known as:  FISH OIL           simvastatin 20 MG Tabs   1 tablet by mouth at bedtime   What changed:    - how much to take  - how to take this  - when to take this  - additional

## (undated) NOTE — Clinical Note
January 12, 2017    Luis Manuel Aguirre MD  [facility]     Patient: Cristel Morales   YOB: 1950   Date of Visit: 1/12/2017       Dear Dr. Rosi Perera MD:    Thank you for referring Cristel Morales to me for evaluation.  Here Current Outpatient Prescriptions:  lisinopril 10 MG Oral Tab  Disp:  Rfl:    Glucose Blood (VINNIE CONTOUR TEST) In Vitro Strip apply 1 by Misc. (Non-Drug; Combo Route) route 2 times every day Disp: 100 each Rfl: 3   omega-3 fatty acids 1000 MG Oral Cap Take Vitreous Vitreous floaters Vitreous floaters      Fundus Exam      Right Left    Disc Sloping margin, Temporal crescent Sloping margin, Temporal crescent    C/D Ratio 0.8 0.65    Macula Normal- no BDR Normal- no BDR    Vessels Normal Normal    Periphery N Follow up instructions:  Return for Next avail. VF and OCT with no MD, If glaucoma diagnostics are wnl, 1 yr dil EE.    1/12/2017  Scribed by: Andrew Tovar MD        If you have questions, please do not hesitate to call me.  I look forward to following

## (undated) NOTE — LETTER
July 12, 2018    Yulia Doyle MD  [facility]     Patient: Allyn Carpenter   YOB: 1950   Date of Visit: 7/12/2018       Dear Dr. Tere Norton MD:    Thank you for referring Allyn Carpenter to me for evaluation.  Here is Comment: 1 beer/month      Medications:    Current Outpatient Prescriptions:  MetFORMIN HCl  MG Oral Tablet 24 Hr Take 1 tablet (750 mg total) by mouth 2 (two) times daily with meals.  Disp: 180 tablet Rfl: 1   allopurinol 100 MG Oral Tab Take 1 ta Vitreous Vitreous floaters Vitreous floaters          Fundus Exam       Right Left    Disc Sloping margin, Temporal crescent Sloping margin, Temporal crescent    C/D Ratio 0.8 0.65    Macula Normal- no BDR Normal- no BDR    Vessels Normal Normal    Periph Guthrie Visual Field - OU - Both Eyes    Meds This Visit:    No prescriptions requested or ordered in this encounter     Follow up instructions:  Return in about 1 year (around 7/12/2019) for DM Dilated exam.    7/12/2018  Scribed by: Cooper Armando,

## (undated) NOTE — LETTER
7/12/2018    Patient: Joe Castillo   MR Number: [id number]   YOB: 1950   Date of Visit: 7/12/2018   Physician: Andrew Tovar MD     Dear Medicare Patient:  Cheryl Astudillo TO BENEFICIARY:  Please know that while a refraction is impo

## (undated) NOTE — MR AVS SNAPSHOT
Kristal  [address]  [phone]               Thank you for choosing us for your health care visit with Cooper Armando MD.  We are glad to serve you and happy to provide you with this summa Field Test with Deloris Sandy MD   [facility]hthal[facility] ([facility])    [address] [place] [address] [address] [id number] [phone]              Allergies as of Jan 12, 2017     No Known Allergies Guthrie Visual Field - OU - Both Eyes    Complete by: Mar 12, 2017 (Approximate)    Assoc Dx:  Glaucoma suspect of both eyes [H40.003]                 Follow-up Instructions     Return for Next avail.  VF and OCT with no MD, If glaucoma diagnostics are w

## (undated) NOTE — LETTER
03/13/19        [address] [place] [facility] [address]      Dear Margarita Sun,    [facility] records indicate that you have outstanding lab work and or testing that was ordered for you and has not yet been completed:  Orders Placed This Encounter

## (undated) NOTE — LETTER
September 16, 2020    Stone Novak MD  [address] [facility]     Patient: Jen De Leon   YOB: 1950   Date of Visit: 9/16/2020       Dear Dr. Umu Diane MD:    Thank you for referring Lupe Elders to me for evaluation. Social History: Social History    Tobacco Use      Smoking status: Never Smoker      Smokeless tobacco: Never Used    Alcohol use:  Yes      Alcohol/week: 0.0 standard drinks      Comment: 1 beer/month    Drug use: No      Medications:  Current Outpatient M Full Full          Extraocular Movement       Right Left     Full, Ortho Full, Ortho          Dilation     Both eyes:  1.0% Mydriacyl and 2.5% Matheus Synephrine @ 1:44 PM            Slit Lamp and Fundus Exam     Slit Lamp Exam       Right Left    Lids/Lashe Diabetes type II: no background of retinopathy, no signs of neovascularization noted. Discussed ocular and systemic benefits of blood sugar control. Diagnosis and treatment discussed in detail with patient.       Vitreous floaters of both eyes  No treatme

## (undated) NOTE — MR AVS SNAPSHOT
[place]al  [address]  [phone]               Thank you for choosing us for your health care visit with Liza Baig MD.  We are glad to serve you and happy to provide you with this lópez [place]    It is the patient's responsibility to check with and follow their insurance company's guidelines for prior authorization for this test.  You may be held responsible for payment in full if proper authorization is not acquired.   Please contac * Notice: This list has 2 medication(s) that are the same as other medications prescribed for you. Read the directions carefully, and ask your doctor or other care provider to review them with you.          Where to Get Your Medications      These medi

## (undated) NOTE — LETTER
08/01/18        [address] [place] [address]      Dear Kyle Reynolds,    [facility] records indicate that you have outstanding lab work and or testing that was ordered for you and has not yet been completed:          CBC W Differential W Plat

## (undated) NOTE — ED AVS SNAPSHOT
[facility] in [place] [address]    Phone:  [phone]    Fax:  [phone]           Lisseth Coronel   MRN: [id number]    Department:  [facility] in [facility]   Date of Visit:  5/3 benefit level being available to you or other limited reimbursement. The physician may seek payment directly from you for amounts other than your deductible, co-payment, or co-insurance and for other services not covered under your health insurance plan. If you believe that any of the medications or instructions on this list is different from what your Primary Care doctor has instructed you - please continue to take your medications as instructed by your Primary Care doctor until you can check with your do Patient [address] to help you get signed up for insurance coverage. Patient [address] is a Federal Navigator program that can help with your Affordable Care Act coverage, as well as all types of Medicaid plans.   To get signed up and covere

## (undated) NOTE — MR AVS SNAPSHOT
[address] [address]  [phone]               Thank you for choosing us for your health care visit with Contreras Bragg MD.  We are glad to serve you and happy to provide you with this summary temperature source Oral, resp. rate 20, height 6' (1.829 m), weight 224 lb (101.606 kg), SpO2 96 %. Blood pressure looks well controlled but the potassium levels are elevated and the renal functions are declining–EGFR suggestive of CKD stage III.   Advise MetFORMIN HCl  MG Oral Tablet 24 Hr              Allergies as of Jun 08, 2017     No Known Allergies                Today's Vital Signs     BP Pulse Temp Height Weight BMI    123/73 mmHg 79 97.5 °F (36.4 °C) (Oral) 6' (1.829 m) 224 lb (101.606 kg) 3 office, you can view your past visit information in [facility] by going to Visits < Visit Summaries. [facility] questions? Call [phone] for help. [facility] is NOT to be used for urgent needs. For medical emergencies, dial 911.            Visit [facility]

## (undated) NOTE — LETTER
02/23/18        [place] [address]      Dear Phoenix,    [address] [place] [address] records indicate that you have outstanding lab work and or testing that was ordered for you and has not yet been completed:          CBC W Differential W Plat

## (undated) NOTE — MR AVS SNAPSHOT
[place]  [address]  [phone]               Thank you for choosing us for your health care visit with Lyla Pérez MD.  We are glad to serve you and happy to provide you with this lópez 1 tablet by mouth at bedtime   What changed:    - how much to take  - how to take this  - when to take this  - additional instructions   Commonly known as:  ZOCOR           * Notice:   This list has 2 medication(s) that are the same as other medications pre HOW TO GET STARTED: HOW TO STAY MOTIVATED:   Start activities slowly and build up over time Do what you like   Get your heart pumping – brisk walking, biking, swimming Even 10 minute increments are effective and add up over the week   2 ½ hours per week –

## (undated) NOTE — LETTER
[facility]  Department of Radiology  [address] [facility]  [phone]      Name: Eitan Austin Dr: Caitlin Herzog MD  : 1950    Age/Sex: 69year old Male  Pt.  Phone: [phone]  Exam Date: 2020  Pro TOTAL CALCIUM SCORE:         9896        Findings are consistent with extensive atherosclerotic plaque. High likelihood of at least one significant coronary narrowing.        INCIDENTAL FINDINGS:  Please refer to the radiologist's separate over read report Patient Summary   MRN: [id number] Description: Male : 1950   Result   CBC WITH DIFFERENTIAL WITH PLATELET (Order [id number])   Linked Results     Procedure Abnormality Status   CBC W/ DIFFERENTIAL  Final result   Order Providers     Authorizing Brit Owens Authorizing Encounter   [facility] OF THE [facility]          COMP METABOLIC PANEL (14) [[id number]] (Abnormal)   Resulted: 06/01/20 0837, Result status: Final result   Resulting lab: [facility])   Specimen Information     ID Typ Comment: Desirable  <200 mg/dL   Borderline  200-239 mg/dL   High      >=240 mg/dL      HDL Cholesterol 39 40 - 59 mg/dL L   Comment:       Interpretive Information:   An HDL cholesterol <40 mg/dL is low and constitutes a coronary heart disease risk factor eAG is the estimated average glucose calculated from Hgb A1c according to the formula recommended by the American Diabetes Association.  eAG levels reflect the long term average glucose and may not correlate with random or fasting glucose levels since these

## (undated) NOTE — MR AVS SNAPSHOT
[address] [address]  [phone]               Thank you for choosing us for your health care visit with Linda Pearson MD.  We are glad to serve you and happy to provide you with this summary Advised to see me in about 3 months for a follow-up on the blood pressure and recheck labs as ordered prior to the office visit.          Relevant Orders    COMP METABOLIC PANEL (14)    Gout - Primary     Uric acid levels were at 7.5 and patient has had gou Commonly known as:  FISH OIL                Where to Get Your Medications      These medications were sent to [facility] OF [facility] [address], [address] [phone], [phone]  [address] [place] [address] [address] [id number]     Phone:  [phone]